# Patient Record
Sex: MALE | Race: WHITE | NOT HISPANIC OR LATINO | Employment: FULL TIME | URBAN - METROPOLITAN AREA
[De-identification: names, ages, dates, MRNs, and addresses within clinical notes are randomized per-mention and may not be internally consistent; named-entity substitution may affect disease eponyms.]

---

## 2020-03-10 ENCOUNTER — OFFICE VISIT (OUTPATIENT)
Dept: FAMILY MEDICINE | Facility: CLINIC | Age: 50
End: 2020-03-10
Payer: COMMERCIAL

## 2020-03-10 VITALS
DIASTOLIC BLOOD PRESSURE: 65 MMHG | BODY MASS INDEX: 27.26 KG/M2 | TEMPERATURE: 97.9 F | WEIGHT: 173.7 LBS | SYSTOLIC BLOOD PRESSURE: 114 MMHG | OXYGEN SATURATION: 97 % | HEIGHT: 67 IN | HEART RATE: 52 BPM

## 2020-03-10 DIAGNOSIS — M67.441 GANGLION CYST OF FLEXOR TENDON SHEATH OF FINGER OF RIGHT HAND: ICD-10-CM

## 2020-03-10 DIAGNOSIS — D22.9 NUMEROUS MOLES: ICD-10-CM

## 2020-03-10 DIAGNOSIS — Z13.220 LIPID SCREENING: ICD-10-CM

## 2020-03-10 DIAGNOSIS — Z12.5 SCREENING FOR PROSTATE CANCER: ICD-10-CM

## 2020-03-10 DIAGNOSIS — L30.9 ECZEMA, UNSPECIFIED TYPE: ICD-10-CM

## 2020-03-10 DIAGNOSIS — R68.82 DECREASED LIBIDO: ICD-10-CM

## 2020-03-10 DIAGNOSIS — Z00.00 ROUTINE HISTORY AND PHYSICAL EXAMINATION OF ADULT: Primary | ICD-10-CM

## 2020-03-10 DIAGNOSIS — J30.9 ALLERGIC RHINITIS, UNSPECIFIED SEASONALITY, UNSPECIFIED TRIGGER: ICD-10-CM

## 2020-03-10 DIAGNOSIS — Z12.11 COLON CANCER SCREENING: ICD-10-CM

## 2020-03-10 DIAGNOSIS — B35.3 TINEA PEDIS OF BOTH FEET: ICD-10-CM

## 2020-03-10 DIAGNOSIS — N43.40 SPERMATOCELE: ICD-10-CM

## 2020-03-10 DIAGNOSIS — J45.20 MILD INTERMITTENT ASTHMA WITHOUT COMPLICATION: ICD-10-CM

## 2020-03-10 LAB
BASOPHILS # BLD AUTO: 0 10E9/L (ref 0–0.2)
BASOPHILS NFR BLD AUTO: 0.7 %
DIFFERENTIAL METHOD BLD: NORMAL
EOSINOPHIL # BLD AUTO: 0.2 10E9/L (ref 0–0.7)
EOSINOPHIL NFR BLD AUTO: 3.7 %
ERYTHROCYTE [DISTWIDTH] IN BLOOD BY AUTOMATED COUNT: 13.1 % (ref 10–15)
HCT VFR BLD AUTO: 42.2 % (ref 40–53)
HGB BLD-MCNC: 14.3 G/DL (ref 13.3–17.7)
LYMPHOCYTES # BLD AUTO: 2 10E9/L (ref 0.8–5.3)
LYMPHOCYTES NFR BLD AUTO: 33.2 %
MCH RBC QN AUTO: 29.7 PG (ref 26.5–33)
MCHC RBC AUTO-ENTMCNC: 33.9 G/DL (ref 31.5–36.5)
MCV RBC AUTO: 88 FL (ref 78–100)
MONOCYTES # BLD AUTO: 0.6 10E9/L (ref 0–1.3)
MONOCYTES NFR BLD AUTO: 9.9 %
NEUTROPHILS # BLD AUTO: 3.1 10E9/L (ref 1.6–8.3)
NEUTROPHILS NFR BLD AUTO: 52.5 %
PLATELET # BLD AUTO: 226 10E9/L (ref 150–450)
RBC # BLD AUTO: 4.81 10E12/L (ref 4.4–5.9)
WBC # BLD AUTO: 6 10E9/L (ref 4–11)

## 2020-03-10 PROCEDURE — 85025 COMPLETE CBC W/AUTO DIFF WBC: CPT | Performed by: INTERNAL MEDICINE

## 2020-03-10 PROCEDURE — G0103 PSA SCREENING: HCPCS | Performed by: INTERNAL MEDICINE

## 2020-03-10 PROCEDURE — 36415 COLL VENOUS BLD VENIPUNCTURE: CPT | Performed by: INTERNAL MEDICINE

## 2020-03-10 PROCEDURE — 82274 ASSAY TEST FOR BLOOD FECAL: CPT | Performed by: INTERNAL MEDICINE

## 2020-03-10 PROCEDURE — 84270 ASSAY OF SEX HORMONE GLOBUL: CPT | Performed by: INTERNAL MEDICINE

## 2020-03-10 PROCEDURE — 99214 OFFICE O/P EST MOD 30 MIN: CPT | Mod: 25 | Performed by: INTERNAL MEDICINE

## 2020-03-10 PROCEDURE — 84403 ASSAY OF TOTAL TESTOSTERONE: CPT | Performed by: INTERNAL MEDICINE

## 2020-03-10 PROCEDURE — 99386 PREV VISIT NEW AGE 40-64: CPT | Performed by: INTERNAL MEDICINE

## 2020-03-10 PROCEDURE — 80053 COMPREHEN METABOLIC PANEL: CPT | Performed by: INTERNAL MEDICINE

## 2020-03-10 PROCEDURE — 80061 LIPID PANEL: CPT | Performed by: INTERNAL MEDICINE

## 2020-03-10 RX ORDER — CETIRIZINE HYDROCHLORIDE 10 MG/1
10 TABLET ORAL DAILY
COMMUNITY
End: 2020-11-09

## 2020-03-10 RX ORDER — KETOCONAZOLE 20 MG/G
CREAM TOPICAL 2 TIMES DAILY
Qty: 30 G | Refills: 1 | Status: SHIPPED | OUTPATIENT
Start: 2020-03-10

## 2020-03-10 RX ORDER — MONTELUKAST SODIUM 10 MG/1
10 TABLET ORAL AT BEDTIME
Qty: 30 TABLET | Refills: 3 | Status: SHIPPED | OUTPATIENT
Start: 2020-03-10 | End: 2020-07-29

## 2020-03-10 RX ORDER — TOLNAFTATE 1 G/100G
POWDER TOPICAL 2 TIMES DAILY
Qty: 108 G | Refills: 2 | Status: SHIPPED | OUTPATIENT
Start: 2020-03-10 | End: 2020-11-09

## 2020-03-10 RX ORDER — ALBUTEROL SULFATE 90 UG/1
2 AEROSOL, METERED RESPIRATORY (INHALATION) EVERY 6 HOURS
COMMUNITY

## 2020-03-10 RX ORDER — TRIAMCINOLONE ACETONIDE 1 MG/G
CREAM TOPICAL 2 TIMES DAILY
Qty: 30 G | Refills: 2 | Status: SHIPPED | OUTPATIENT
Start: 2020-03-10 | End: 2020-11-09

## 2020-03-10 ASSESSMENT — MIFFLIN-ST. JEOR: SCORE: 1606.53

## 2020-03-10 NOTE — PATIENT INSTRUCTIONS
"Proceed with your testicular ultrasound.  [phone]    Follow up with Urologist for your Spermatocele.    Follow up with Hand specialist for your right palm cyst.    Wash and dry the affected area and then apply the prescribed cream thinly followed by the prescribed powder.  Do this twice a day until the rash resolves and continue for another 2 weeks after.    Maintain low fat/calorie diet and regular exercise.    Consider getting the Flu vaccine.    Don't forget to return your stool sample.    Follow up yearly or as needed.    Patient Education     Ulnar Nerve Palsy  The ulnar nerve travels down the arm from the shoulder to the hand. It controls movement and feeling in the wrist and hand. Damage to this nerve can cause a condition called ulnar nerve palsy.  A common cause of ulnar nerve palsy is leaning on the elbow for long periods of time. Injury to the arm or elbow, such as a fracture, can also damage the ulnar nerve.  Symptoms of ulnar nerve palsy include:    Numbness, tingling, or burning (often described as \"pins and needles\")    Pain    Weakness or muscle shrinking in the hand and fingers  The treatment depends on the cause of the nerve damage. In some cases, the problem will go away on its own once pressure to the nerve is relieved. Certain tests may be done to help determine the injury and extent of damage. These include nerve conduction studies (NCS) and electromyography (EMG). Splinting the wrist to limit movement may help with healing. If the problem is due to trauma or injury, physical therapy may be prescribed. Corticosteroid injections into the area may reduce swelling and pressure on the nerve. Surgery to repair the nerve may be needed for chronic symptoms that don't respond to simpler treatments.  Home care    Rest the wrist and arm until normal feeling and strength return.    If you were given a splint or sling, wear it as directed.    Don't lean on your elbows.    If medicines were " prescribed for pain or nerve sensations, take them as directed.  Follow-up care  Follow up with your healthcare provider or as advised by our staff.  When to seek medical advice  Call your healthcare provider right away if you have any of the following:    Increasing arm swelling or pain    Numbness or weakness of the arm    Symptoms spread to other parts of the body    Slurred speech, confusion    Trouble speaking, walking, or seeing  Date Last Reviewed: 4/1/2018 2000-2019 [employer]. [address] [place] [address]. All rights reserved. This information is not intended as a substitute for professional medical care. Always follow your healthcare professional's instructions.           Patient Education     What is Cubital Tunnel Syndrome?  Cubital tunnel syndrome is a set of symptoms that may occur if the ulnar nerve in your elbow gets pinched. This may happen if you bend or lean on your elbows often.    Your cubital tunnel  The cubital tunnel is a groove in a bone near your elbow. This narrow groove provides a passage for the ulnar nerve, one of the main nerves in your arm. The ulnar nerve can cause  funny bone  pain if your elbow gets bumped. Your cubital tunnel helps protect this nerve as it passes through your elbow and down to your fingers.  Compressing the ulnar nerve  Bending your elbow compresses the ulnar nerve inside the cubital tunnel. The nerve can get inflamed (irritated) after constant bending and pinching or after getting hurt. Over time, this can lead to pain or numbness. The pain is often felt in your ring fingers and little fingers.     Bending your elbow as you hold a phone can cause problems over time.      What are its symptoms?    Numbness or tingling in the ring fingers and little fingers    Loss of finger or hand strength    Inability to straighten fingers    Sharp, sudden pain when elbow is touched    Shrinking of hand muscles  The road to healing  You can keep  cubital tunnel syndrome from flaring up. Keep your arm straight as much as you can, even while sleeping, to prevent pinching of the ulnar nerve. And use phone headsets and elbow pads. If you still have pain, tell your doctor.   Date Last Reviewed: 5/1/2018 2000-2019 [employer]. [address] [place] [address], [place] [address]. All rights reserved. This information is not intended as a substitute for professional medical care. Always follow your healthcare professional's instructions.

## 2020-03-10 NOTE — PROGRESS NOTES
SUBJECTIVE:   CC: Nick Merlos is an 50 year old male who presents for preventative health visit.         Healthy Habits:     Getting at least 3 servings of Calcium per day:  Yes (1-2 servings)    Bi-annual eye exam:  Yes    Dental care twice a year:  NO    Sleep apnea or symptoms of sleep apnea:  None    Diet:  Regular (no restrictions)    Frequency of exercise:  6-7 days/week    Duration of exercise:  30-45 minutes    Taking medications regularly:  Yes    Barriers to taking medications:  None    Medication side effects:  None    PHQ-2 Total Score: 0    Additional concerns today:  Yes (Labs, liver, kidney)      Today's PHQ-2 Score:   PHQ-2 ( 1999 Pfizer) 3/10/2020   Q1: Little interest or pleasure in doing things 0   Q2: Feeling down, depressed or hopeless 0   PHQ-2 Score 0       Abuse: Current or Past(Physical, Sexual or Emotional)- No  Do you feel safe in your environment? Yes      Social History     Tobacco Use     Smoking status: Former Smoker     Smokeless tobacco: Never Used   Substance Use Topics     Alcohol use: Yes     Comment: 3 drinks per week     If you drink alcohol do you typically have >3 drinks per day or >7 drinks per week? No    Alcohol Use 3/10/2020   Prescreen: >3 drinks/day or >7 drinks/week? No       Last PSA:   PSA   Date Value Ref Range Status   03/10/2020 1.01 0 - 4 ug/L Final     Comment:     Assay Method:  Chemiluminescence using Siemens Vista analyzer       Reviewed orders with patient. Reviewed health maintenance and updated orders accordingly - Yes      Reviewed and updated as needed this visit by clinical staff  Tobacco  Allergies  Meds  Problems  Med Hx  Surg Hx  Soc Hx        Reviewed and updated as needed this visit by Provider  Allergies  Meds  Problems  Med Hx  Surg Hx        History reviewed. No pertinent past medical history.    History reviewed. No pertinent surgical history.    No family history on file.    Social History     Tobacco Use     Smoking status:  "Former Smoker     Smokeless tobacco: Never Used   Substance Use Topics     Alcohol use: Yes     Comment: 3 drinks per week       Review of Systems   Constitutional: Negative for chills, fatigue and fever.   HENT: Negative for congestion, ear pain, hearing loss, sore throat and trouble swallowing.    Eyes: Negative for pain and visual disturbance.   Respiratory: Negative for cough and shortness of breath.    Cardiovascular: Negative for chest pain and palpitations.   Gastrointestinal: Negative for abdominal pain, blood in stool, constipation, diarrhea, nausea and vomiting.   Genitourinary: Negative for difficulty urinating and testicular pain.   Musculoskeletal: Negative for arthralgias, back pain, myalgias and neck pain.   Skin: Negative for rash.   Neurological: Negative for dizziness, light-headedness, numbness and headaches.   Psychiatric/Behavioral: Negative for sleep disturbance. The patient is not nervous/anxious.          OBJECTIVE:   /65 (BP Location: Left arm, Cuff Size: Adult Regular)   Pulse 52   Temp 97.9  F (36.6  C) (Oral)   Ht 1.702 m (5' 7\")   Wt 78.8 kg (173 lb 11.2 oz)   SpO2 97%   BMI 27.21 kg/m      Physical Exam  Vitals signs and nursing note reviewed.   Constitutional:       General: He is not in acute distress.  HENT:      Right Ear: External ear normal.      Left Ear: External ear normal.   Eyes:      Conjunctiva/sclera: Conjunctivae normal.      Pupils: Pupils are equal, round, and reactive to light.   Neck:      Thyroid: No thyromegaly.   Cardiovascular:      Rate and Rhythm: Normal rate and regular rhythm.      Heart sounds: Normal heart sounds.   Pulmonary:      Effort: Pulmonary effort is normal. No respiratory distress.      Breath sounds: Normal breath sounds.   Abdominal:      Palpations: Abdomen is soft.      Tenderness: There is no abdominal tenderness.   Genitourinary:     Penis: No discharge.    Musculoskeletal: Normal range of motion.         General: No tenderness. "   Lymphadenopathy:      Cervical: No cervical adenopathy.   Skin:     Findings: No rash.   Neurological:      Mental Status: He is alert and oriented to person, place, and time.      Coordination: Coordination normal.       ASSESSMENT/PLAN:       ICD-10-CM    1. Routine history and physical examination of adult  Z00.00 CBC with platelets differential     Comprehensive metabolic panel   2. Lipid screening  Z13.220 Lipid panel reflex to direct LDL Fasting   3. Colon cancer screening  Z12.11 Fecal colorectal cancer screen FIT   4. Screening for prostate cancer  Z12.5 Prostate spec antigen screen   5. Mild intermittent asthma without complication  J45.20 montelukast (SINGULAIR) 10 MG tablet   6. Allergic rhinitis, unspecified seasonality, unspecified trigger  J30.9 montelukast (SINGULAIR) 10 MG tablet   7. Ganglion cyst of flexor tendon sheath of finger of right hand  M67.441 ORTHOPEDICS ADULT REFERRAL   8. Tinea pedis of both feet  B35.3 ketoconazole (NIZORAL) 2 % external cream     tolnaftate (TINACTIN) 1 % external powder   9. Decreased libido  R68.82 Testosterone Free and Total   10. Spermatocele  N43.40 US Testicular and Scrotum     UROLOGY ADULT REFERRAL   11. Numerous moles  D22.9 DERMATOLOGY REFERRAL   12. Eczema, unspecified type  L30.9 triamcinolone (KENALOG) 0.1 % external cream         Patient Instructions     Proceed with your testicular ultrasound.  [phone]    Follow up with Urologist for your Spermatocele.    Follow up with Hand specialist for your right palm cyst.    Wash and dry the affected area and then apply the prescribed cream thinly followed by the prescribed powder.  Do this twice a day until the rash resolves and continue for another 2 weeks after.    Maintain low fat/calorie diet and regular exercise.    Consider getting the Flu vaccine.    Don't forget to return your stool sample.    Follow up yearly or as needed.    Patient Education     Ulnar Nerve Palsy  The ulnar nerve travels down  "the arm from the shoulder to the hand. It controls movement and feeling in the wrist and hand. Damage to this nerve can cause a condition called ulnar nerve palsy.  A common cause of ulnar nerve palsy is leaning on the elbow for long periods of time. Injury to the arm or elbow, such as a fracture, can also damage the ulnar nerve.  Symptoms of ulnar nerve palsy include:    Numbness, tingling, or burning (often described as \"pins and needles\")    Pain    Weakness or muscle shrinking in the hand and fingers  The treatment depends on the cause of the nerve damage. In some cases, the problem will go away on its own once pressure to the nerve is relieved. Certain tests may be done to help determine the injury and extent of damage. These include nerve conduction studies (NCS) and electromyography (EMG). Splinting the wrist to limit movement may help with healing. If the problem is due to trauma or injury, physical therapy may be prescribed. Corticosteroid injections into the area may reduce swelling and pressure on the nerve. Surgery to repair the nerve may be needed for chronic symptoms that don't respond to simpler treatments.  Home care    Rest the wrist and arm until normal feeling and strength return.    If you were given a splint or sling, wear it as directed.    Don't lean on your elbows.    If medicines were prescribed for pain or nerve sensations, take them as directed.  Follow-up care  Follow up with your healthcare provider or as advised by our staff.  When to seek medical advice  Call your healthcare provider right away if you have any of the following:    Increasing arm swelling or pain    Numbness or weakness of the arm    Symptoms spread to other parts of the body    Slurred speech, confusion    Trouble speaking, walking, or seeing  Date Last Reviewed: 4/1/2018 2000-2019 [employer]. [address] [place] [id number]. All rights reserved. This information is not intended as a " substitute for professional medical care. Always follow your healthcare professional's instructions.           Patient Education     What is Cubital Tunnel Syndrome?  Cubital tunnel syndrome is a set of symptoms that may occur if the ulnar nerve in your elbow gets pinched. This may happen if you bend or lean on your elbows often.    Your cubital tunnel  The cubital tunnel is a groove in a bone near your elbow. This narrow groove provides a passage for the ulnar nerve, one of the main nerves in your arm. The ulnar nerve can cause  funny bone  pain if your elbow gets bumped. Your cubital tunnel helps protect this nerve as it passes through your elbow and down to your fingers.  Compressing the ulnar nerve  Bending your elbow compresses the ulnar nerve inside the cubital tunnel. The nerve can get inflamed (irritated) after constant bending and pinching or after getting hurt. Over time, this can lead to pain or numbness. The pain is often felt in your ring fingers and little fingers.     Bending your elbow as you hold a phone can cause problems over time.      What are its symptoms?    Numbness or tingling in the ring fingers and little fingers    Loss of finger or hand strength    Inability to straighten fingers    Sharp, sudden pain when elbow is touched    Shrinking of hand muscles  The road to healing  You can keep cubital tunnel syndrome from flaring up. Keep your arm straight as much as you can, even while sleeping, to prevent pinching of the ulnar nerve. And use phone headsets and elbow pads. If you still have pain, tell your doctor.   Date Last Reviewed: 5/1/2018 2000-2019 [employer]. [address] [place] [address], [place] [id number]. All rights reserved. This information is not intended as a substitute for professional medical care. Always follow your healthcare professional's instructions.             COUNSELING:   Reviewed preventive health counseling, as reflected in patient  "instructions    Estimated body mass index is 27.21 kg/m  as calculated from the following:    Height as of this encounter: 1.702 m (5' 7\").    Weight as of this encounter: 78.8 kg (173 lb 11.2 oz).     Weight management plan: Discussed healthy diet and exercise guidelines     reports that he has quit smoking. He has never used smokeless tobacco.      Counseling Resources:  ATP IV Guidelines  Pooled Cohorts Equation Calculator  FRAX Risk Assessment  ICSI Preventive Guidelines  Dietary Guidelines for Americans, 2010  USDA's MyPlate  ASA Prophylaxis  Lung CA Screening    Milad Roberson MD  [facility]  "

## 2020-03-10 NOTE — LETTER
March 16, 2020      Nick Montiel  [address]   [facility] [address]        Dear Mr.Hodge Montiel,    Good day to you Mr. Tacho Montiel.     Your testosterone levels are normal.        If you have any questions or concerns, please call the clinic at the number listed above.       Sincerely,        Milad Roberson MD/ Marialuisa Berkowitz CMA

## 2020-03-10 NOTE — LETTER
March 18, 2020      iNck Montiel  [address]   [facility] [address]      Good day to you Mr. Tacho Montiel.     Your stool test for colon cancer screening is normal.  Although this method is an accepted standard for colon cancer screening, it is not as sensitive as a colonoscopy and still has a margin for error.  This test will need to be performed once a year.  The [place] Preventive Services Task Force (USPSTF) does not give preference for any one screening test over another.     Please seek medical attention if you develop unexplained weight loss, persistent abdominal pain/discomfort, nausea/vomiting, changes in bowel movements, blood in your stools, or any other unusual intestinal symptoms.     Jose,     Dr. Roberson / magui    Resulted Orders   Fecal colorectal cancer screen FIT   Result Value Ref Range    Occult Blood Scn FIT Negative NEG^Negative

## 2020-03-10 NOTE — LETTER
March 12, 2020      Nick Montiel  [address]   [facility] [address]        Dear Mr.Hodge Montiel,    Good day to you Mr. Tacho Montiel.     Your prostate cancer screening test (PSA) is normal.     Your blood counts, electrolytes (sodium, potassium), kidney (creatinine, GFR) and liver function (alk phos, AST, ALT) are within normal limits.     Your fasting blood sugar (glucose) is mildly elevated.  Although this is not at the level of diabetes yet, your chances for developing diabetes later on is greater compared to having a blood sugar less than 100.  I would advise that you maintain a low calorie/carbohydrate diet and regular exercise.     Your cholesterol levels, especially your LDL level, are significantly high and puts you at great risk for developing heart disease and stroke.  Please call me at the clinic to discuss further management.     Resulted Orders   CBC with platelets differential   Result Value Ref Range    WBC 6.0 4.0 - 11.0 10e9/L    RBC Count 4.81 4.4 - 5.9 10e12/L    Hemoglobin 14.3 13.3 - 17.7 g/dL    Hematocrit 42.2 40.0 - 53.0 %    MCV 88 78 - 100 fl    MCH 29.7 26.5 - 33.0 pg    MCHC 33.9 31.5 - 36.5 g/dL    RDW 13.1 10.0 - 15.0 %    Platelet Count 226 150 - 450 10e9/L    % Neutrophils 52.5 %    % Lymphocytes 33.2 %    % Monocytes 9.9 %    % Eosinophils 3.7 %    % Basophils 0.7 %    Absolute Neutrophil 3.1 1.6 - 8.3 10e9/L    Absolute Lymphocytes 2.0 0.8 - 5.3 10e9/L    Absolute Monocytes 0.6 0.0 - 1.3 10e9/L    Absolute Eosinophils 0.2 0.0 - 0.7 10e9/L    Absolute Basophils 0.0 0.0 - 0.2 10e9/L    Diff Method Automated Method    Comprehensive metabolic panel   Result Value Ref Range    Sodium 137 133 - 144 mmol/L    Potassium 4.2 3.4 - 5.3 mmol/L    Chloride 107 94 - 109 mmol/L    Carbon Dioxide 26 20 - 32 mmol/L    Anion Gap 4 3 - 14 mmol/L    Glucose 103 (H) 70 - 99 mg/dL    Urea Nitrogen 15 7 - 30 mg/dL    Creatinine 0.86 0.66 - 1.25 mg/dL    GFR Estimate >90 >60  mL/min/[1.73_m2]      Comment:      Non  GFR Calc  Starting 12/18/2018, serum creatinine based estimated GFR (eGFR) will be   calculated using the Chronic Kidney Disease Epidemiology Collaboration   (CKD-EPI) equation.      GFR Estimate If Black >90 >60 mL/min/[1.73_m2]      Comment:       GFR Calc  Starting 12/18/2018, serum creatinine based estimated GFR (eGFR) will be   calculated using the Chronic Kidney Disease Epidemiology Collaboration   (CKD-EPI) equation.      Calcium 9.0 8.5 - 10.1 mg/dL    Bilirubin Total 0.4 0.2 - 1.3 mg/dL    Albumin 3.8 3.4 - 5.0 g/dL    Protein Total 7.5 6.8 - 8.8 g/dL    Alkaline Phosphatase 84 40 - 150 U/L    ALT 35 0 - 70 U/L    AST 16 0 - 45 U/L   Lipid panel reflex to direct LDL Fasting   Result Value Ref Range    Cholesterol 245 (H) <200 mg/dL      Comment:      Desirable:       <200 mg/dl    Triglycerides 119 <150 mg/dL    HDL Cholesterol 34 (L) >39 mg/dL    LDL Cholesterol Calculated 187 (H) <100 mg/dL      Comment:      Above desirable:  100-129 mg/dl  Borderline High:  130-159 mg/dL  High:             160-189 mg/dL  Very high:       >189 mg/dl      Non HDL Cholesterol 211 (H) <130 mg/dL      Comment:      Above Desirable:  130-159 mg/dl  Borderline high:  160-189 mg/dl  High:             190-219 mg/dl  Very high:       >219 mg/dl     Prostate spec antigen screen   Result Value Ref Range    PSA 1.01 0 - 4 ug/L      Comment:      Assay Method:  Chemiluminescence using Siemens Vista analyzer       If you have any questions or concerns, please call the clinic at the number listed above.       Sincerely,        Milad Roberson MD/ Marialuisa Berkowitz CMA

## 2020-03-11 LAB
ALBUMIN SERPL-MCNC: 3.8 G/DL (ref 3.4–5)
ALP SERPL-CCNC: 84 U/L (ref 40–150)
ALT SERPL W P-5'-P-CCNC: 35 U/L (ref 0–70)
ANION GAP SERPL CALCULATED.3IONS-SCNC: 4 MMOL/L (ref 3–14)
AST SERPL W P-5'-P-CCNC: 16 U/L (ref 0–45)
BILIRUB SERPL-MCNC: 0.4 MG/DL (ref 0.2–1.3)
BUN SERPL-MCNC: 15 MG/DL (ref 7–30)
CALCIUM SERPL-MCNC: 9 MG/DL (ref 8.5–10.1)
CHLORIDE SERPL-SCNC: 107 MMOL/L (ref 94–109)
CHOLEST SERPL-MCNC: 245 MG/DL
CO2 SERPL-SCNC: 26 MMOL/L (ref 20–32)
CREAT SERPL-MCNC: 0.86 MG/DL (ref 0.66–1.25)
GFR SERPL CREATININE-BSD FRML MDRD: >90 ML/MIN/{1.73_M2}
GLUCOSE SERPL-MCNC: 103 MG/DL (ref 70–99)
HDLC SERPL-MCNC: 34 MG/DL
LDLC SERPL CALC-MCNC: 187 MG/DL
NONHDLC SERPL-MCNC: 211 MG/DL
POTASSIUM SERPL-SCNC: 4.2 MMOL/L (ref 3.4–5.3)
PROT SERPL-MCNC: 7.5 G/DL (ref 6.8–8.8)
PSA SERPL-ACNC: 1.01 UG/L (ref 0–4)
SODIUM SERPL-SCNC: 137 MMOL/L (ref 133–144)
TRIGL SERPL-MCNC: 119 MG/DL

## 2020-03-12 LAB
SHBG SERPL-SCNC: 24 NMOL/L (ref 11–80)
TESTOST FREE SERPL-MCNC: 8.66 NG/DL (ref 4.7–24.4)
TESTOST SERPL-MCNC: 363 NG/DL (ref 240–950)

## 2020-03-16 ASSESSMENT — ENCOUNTER SYMPTOMS
SLEEP DISTURBANCE: 0
NERVOUS/ANXIOUS: 0
MYALGIAS: 0
FEVER: 0
BLOOD IN STOOL: 0
VOMITING: 0
CHILLS: 0
HEADACHES: 0
NECK PAIN: 0
LIGHT-HEADEDNESS: 0
PALPITATIONS: 0
DIARRHEA: 0
CONSTIPATION: 0
DIZZINESS: 0
FATIGUE: 0
COUGH: 0
ARTHRALGIAS: 0
TROUBLE SWALLOWING: 0
DIFFICULTY URINATING: 0
EYE PAIN: 0
SHORTNESS OF BREATH: 0
ABDOMINAL PAIN: 0
SORE THROAT: 0
BACK PAIN: 0
NAUSEA: 0
NUMBNESS: 0

## 2020-03-17 ENCOUNTER — PRE VISIT (OUTPATIENT)
Dept: UROLOGY | Facility: CLINIC | Age: 50
End: 2020-03-17

## 2020-03-17 NOTE — TELEPHONE ENCOUNTER
Reason for Visit: Consult    Diagnosis: Spermatocele    Orders/Procedures/Records: Records available    Contact Patient: N/A    Rooming Requirements: Normal      Hannah Kelley  03/17/20  1:38 PM

## 2020-03-17 NOTE — TELEPHONE ENCOUNTER
MEDICAL RECORDS REQUEST   [facility]  Urology Clinic  [address] [place] [address]  PHONE: [phone]  Fax: [phone]        FUTURE VISIT INFORMATION                                                   Nick Lenz Titusomari Montiel, : 1970 scheduled for future visit at [facility]    APPOINTMENT INFORMATION:    Date: 20 5PM    Provider:  Cherie Bartlett RN    Reason for Visit/Diagnosis:  Spermatocele    REFERRAL INFORMATION:    Referring provider:  ROSS CONNORS    Specialty: MD    Referring providers clinic:  [facility]    Clinic contact number:  [phone]    RECORDS REQUESTED FOR VISIT                                                     NOTES  STATUS/DETAILS   OFFICE NOTE from referring provider  yes   OFFICE NOTE from other specialist  no   DISCHARGE SUMMARY from hospital  no   DISCHARGE REPORT from the ER  no   OPERATIVE REPORT  no   MEDICATION LIST  no     PRE-VISIT CHECKLIST      Record collection complete Yes- Internal recs in epic   Appointment appropriately scheduled           (right time/right provider) Yes   MyChart activation If no, please explain: In process    Questionnaire complete If no, please explain: In process      Completed by: Katelyn Montiel

## 2020-03-18 LAB — HEMOCCULT STL QL IA: NEGATIVE

## 2020-03-25 ENCOUNTER — HOSPITAL ENCOUNTER (OUTPATIENT)
Dept: ULTRASOUND IMAGING | Facility: CLINIC | Age: 50
Discharge: HOME | End: 2020-03-25
Attending: INTERNAL MEDICINE | Admitting: INTERNAL MEDICINE
Payer: COMMERCIAL

## 2020-03-25 DIAGNOSIS — N43.40 SPERMATOCELE: ICD-10-CM

## 2020-03-25 PROCEDURE — 76870 US EXAM SCROTUM: CPT

## 2020-04-01 ENCOUNTER — PRE VISIT (OUTPATIENT)
Dept: UROLOGY | Facility: CLINIC | Age: 50
End: 2020-04-01

## 2020-04-07 ENCOUNTER — TELEPHONE (OUTPATIENT)
Dept: UROLOGY | Facility: CLINIC | Age: 50
End: 2020-04-07

## 2020-04-07 NOTE — TELEPHONE ENCOUNTER
Spoke with patient to convert their upcoming appointment with Cherie Bartlett CNP to a Virtual Visit. Patient downloaded the application [employer].      Hannah Kelley EMT

## 2020-04-14 ENCOUNTER — TRANSFERRED RECORDS (OUTPATIENT)
Dept: HEALTH INFORMATION MANAGEMENT | Facility: CLINIC | Age: 50
End: 2020-04-14

## 2020-04-17 ENCOUNTER — VIRTUAL VISIT (OUTPATIENT)
Dept: UROLOGY | Facility: CLINIC | Age: 50
End: 2020-04-17
Payer: COMMERCIAL

## 2020-04-17 DIAGNOSIS — N50.3 EPIDIDYMAL CYST: Primary | ICD-10-CM

## 2020-04-17 ASSESSMENT — PAIN SCALES - GENERAL: PAINLEVEL: NO PAIN (0)

## 2020-04-17 NOTE — PROGRESS NOTES
"Nick Montiel is a 50 year old male who is being evaluated via a billable video visit.      The patient has been notified of following:     \"This video visit will be conducted via a call between you and your physician/provider. We have found that certain health care needs can be provided without the need for an in-person physical exam.  This service lets us provide the care you need with a video conversation.  If a prescription is necessary we can send it directly to your pharmacy.  If lab work is needed we can place an order for that and you can then stop by our lab to have the test done at a later time.    Video visits are billed at different rates depending on your insurance coverage.  Please reach out to your insurance provider with any questions.    If during the course of the call the physician/provider feels a video visit is not appropriate, you will not be charged for this service.\"    Patient has given verbal consent for Video visit? Yes    How would you like to obtain your AVS? Suzy    Patient would like the video invitation sent by: Text to cell phone: [phone]      Video Start Time: 1612            Chief Complaint:   Spermatocele         History of Present Illness:    Nick Montiel is a very pleasant 50 year old male who presents today via video visit for evaluation of the above. A few months ago, he noticed nodularity within his right scrotum. A scrotal ultrasound was obtained on 3/25/20, which demonstrated two slightly complex right epididymal head cysts, the largest measuring 3.8 cm. Today, he states that these are somewhat bothersome due to their size, and he worries that they will continue to get larger. On occasion, he has discomfort in this area with ejaculation. He also notes that he has experienced a dry ejaculation a few times prior to noticing these cysts. He is interested in pursuing surgical intervention.          Past Medical History:   No past medical history on " file.         Past Surgical History:   No past surgical history on file.         Medications     Current Outpatient Medications   Medication     albuterol (PROAIR HFA/PROVENTIL HFA/VENTOLIN HFA) 108 (90 Base) MCG/ACT inhaler     cetirizine (ZYRTEC) 10 MG tablet     ketoconazole (NIZORAL) 2 % external cream     montelukast (SINGULAIR) 10 MG tablet     tolnaftate (TINACTIN) 1 % external powder     triamcinolone (KENALOG) 0.1 % external cream     No current facility-administered medications for this visit.             Family History:   No family history on file.         Social History:     Social History     Socioeconomic History     Marital status:      Spouse name: Not on file     Number of children: Not on file     Years of education: Not on file     Highest education level: Not on file   Occupational History     Not on file   Social Needs     Financial resource strain: Not on file     Food insecurity     Worry: Not on file     Inability: Not on file     Transportation needs     Medical: Not on file     Non-medical: Not on file   Tobacco Use     Smoking status: Former Smoker     Smokeless tobacco: Never Used   Substance and Sexual Activity     Alcohol use: Yes     Comment: 3 drinks per week     Drug use: Never     Sexual activity: Yes     Partners: Male   Lifestyle     Physical activity     Days per week: Not on file     Minutes per session: Not on file     Stress: Not on file   Relationships     Social connections     Talks on phone: Not on file     Gets together: Not on file     Attends Christianity service: Not on file     Active member of club or organization: Not on file     Attends meetings of clubs or organizations: Not on file     Relationship status: Not on file     Intimate partner violence     Fear of current or ex partner: Not on file     Emotionally abused: Not on file     Physically abused: Not on file     Forced sexual activity: Not on file   Other Topics Concern     Not on file   Social History  Narrative     Not on file            Allergies:   Penicillins         Review of Systems:  From intake questionnaire   Negative 14 system review except as noted on HPI, nurse's note.         Physical Exam:   Patient is a 50 year old male   General Appearance Adult: Alert, no acute distress, oriented  Lungs: no respiratory distress, or pursed lip breathing  Neuro: Alert, oriented, speech and mentation normal      Labs and Pathology:    I personally reviewed all applicable laboratory data and went over findings with patient  Significant for:    CBC RESULTS:  Recent Labs   Lab Test 03/10/20  1207   WBC 6.0   HGB 14.3           BMP RESULTS:  Recent Labs   Lab Test 03/10/20  1207      POTASSIUM 4.2   CHLORIDE 107   CO2 26   ANIONGAP 4   *   BUN 15   CR 0.86   GFRESTIMATED >90   GFRESTBLACK >90   ANNIA 9.0       PSA RESULTS  PSA   Date Value Ref Range Status   03/10/2020 1.01 0 - 4 ug/L Final     Comment:     Assay Method:  Chemiluminescence using Siemens Vista analyzer         Imaging:    I personally reviewed all applicable imaging and went over findings with patient.  Significant for:    Results for orders placed or performed during the hospital encounter of 03/25/20   US Testicular and Scrotum    Narrative    ULTRASOUND TESTICULAR AND SCROTUM March 25, 2020 1:04 PM     HISTORY: Spermatocele.    COMPARISON: None.    FINDINGS:   Right: Normal homogeneous testicular echotexture, without focal mass.  Right testicle measures 4.5 x 3 x 2.5 cm. Doppler imaging with color  waveform analysis demonstrates normal arterial waveforms within the  testicle. No evidence for testicular torsion. Two slightly complex  right epididymal head cysts containing low-level echoes, with the  largest measuring 3.8 cm. No hydroceles or varicoceles are evident.     Left: Normal homogeneous testicular echotexture, without focal mass.  Left testicle measures 4.8 x 2.8 x 2.4 cm. Doppler imaging with color  waveform analysis  demonstrates normal arterial waveforms within the  testicle. No evidence for testicular torsion. Epididymis is normal. No  hydroceles or varicoceles are evident.        Impression    IMPRESSION:   1. There are two slightly complex right epididymal head cystic lesions  containing low-level echoes, with the largest measuring 3.8 cm.  2. Otherwise unremarkable testicular ultrasound. No evidence for  testicular torsion or intratesticular mass.     ROBERT ESPINOSA MD            Assessment and Plan:     Assessment: 50 year old male who presents via video visit for evaluation of large right-sided epididymal cysts, identified on ultrasound. These cysts have become bothersome, and he is concerned that they will continue to grow. He would like to pursue surgical intervention.     Plan:   -Follow up with Dr. Clarke to further discuss cyst treatment.       Video-Visit Details    Type of service:  Video Visit    Video End Time (time video stopped): 1621    Originating Location (pt. Location): Home    Distant Location (provider location):  [facility] AND [facility] [[email or web address]] FOR PROSTATE AND UROLOGIC CANCERS     Mode of Communication:  Video Conference via [facility]      Cherie Bartlett CNP  Department of Urology

## 2020-06-02 ENCOUNTER — MYC MEDICAL ADVICE (OUTPATIENT)
Dept: FAMILY MEDICINE | Facility: CLINIC | Age: 50
End: 2020-06-02

## 2020-06-02 DIAGNOSIS — F41.8 DEPRESSION WITH ANXIETY: Primary | ICD-10-CM

## 2020-06-02 NOTE — TELEPHONE ENCOUNTER
TO PCP:     See below, pt asking if you have recommendations/referral for a mental health professional who specializes with GLBT patients     Please advise     Shiloh JADE RN

## 2020-06-08 NOTE — TELEPHONE ENCOUNTER
Order for Mental Health with request for therapist knowledgeable of GLBT dynamics specified in the comments.

## 2020-06-10 ENCOUNTER — VIRTUAL VISIT (OUTPATIENT)
Dept: BEHAVIORAL HEALTH | Facility: CLINIC | Age: 50
End: 2020-06-10
Attending: INTERNAL MEDICINE
Payer: COMMERCIAL

## 2020-06-10 DIAGNOSIS — F10.10 ALCOHOL USE DISORDER, MILD, ABUSE: Primary | ICD-10-CM

## 2020-06-10 PROCEDURE — 99207 ZZC NO BILLABLE SERVICE THIS VISIT: CPT | Mod: GT | Performed by: SOCIAL WORKER

## 2020-06-10 ASSESSMENT — COLUMBIA-SUICIDE SEVERITY RATING SCALE - C-SSRS
1. IN THE PAST MONTH, HAVE YOU WISHED YOU WERE DEAD OR WISHED YOU COULD GO TO SLEEP AND NOT WAKE UP?: NO
1. IN THE PAST MONTH, HAVE YOU WISHED YOU WERE DEAD OR WISHED YOU COULD GO TO SLEEP AND NOT WAKE UP?: NO
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
## 2020-06-16 ENCOUNTER — MYC MEDICAL ADVICE (OUTPATIENT)
Dept: FAMILY MEDICINE | Facility: CLINIC | Age: 50
End: 2020-06-16

## 2020-06-16 NOTE — TELEPHONE ENCOUNTER
"TO PCP:     1) pt reports muscle aches from Atorvastatin 20mg and has not been taking per advice of pharmacist - would you recommend stopping/alternative med?     2) pt asking for a \"diet plan\" - unable to find this in notes/AVS    Please advise     Shiloh JADE RN    "

## 2020-06-18 ENCOUNTER — MYC MEDICAL ADVICE (OUTPATIENT)
Dept: FAMILY MEDICINE | Facility: CLINIC | Age: 50
End: 2020-06-18

## 2020-06-18 NOTE — TELEPHONE ENCOUNTER
Dr Roberson - see below message (and prior Duplicate message)     Pt awaiting a response regarding this    Please advise     Thank you  Shiloh JADE RN

## 2020-06-24 ENCOUNTER — VIRTUAL VISIT (OUTPATIENT)
Dept: BEHAVIORAL HEALTH | Facility: CLINIC | Age: 50
End: 2020-06-24
Payer: COMMERCIAL

## 2020-06-24 DIAGNOSIS — F10.20 ALCOHOL USE DISORDER, MODERATE, DEPENDENCE (H): Primary | ICD-10-CM

## 2020-06-24 DIAGNOSIS — F31.81 HYPOMANIC BIPOLAR II DISORDER (H): ICD-10-CM

## 2020-06-24 PROCEDURE — 90791 PSYCH DIAGNOSTIC EVALUATION: CPT | Mod: GT | Performed by: SOCIAL WORKER

## 2020-06-24 ASSESSMENT — ANXIETY QUESTIONNAIRES
1. FEELING NERVOUS, ANXIOUS, OR ON EDGE: NOT AT ALL
GAD7 TOTAL SCORE: 4
2. NOT BEING ABLE TO STOP OR CONTROL WORRYING: NOT AT ALL
4. TROUBLE RELAXING: SEVERAL DAYS
5. BEING SO RESTLESS THAT IT IS HARD TO SIT STILL: NOT AT ALL
7. FEELING AFRAID AS IF SOMETHING AWFUL MIGHT HAPPEN: NOT AT ALL
6. BECOMING EASILY ANNOYED OR IRRITABLE: NEARLY EVERY DAY
3. WORRYING TOO MUCH ABOUT DIFFERENT THINGS: NOT AT ALL

## 2020-06-24 ASSESSMENT — PATIENT HEALTH QUESTIONNAIRE - PHQ9: SUM OF ALL RESPONSES TO PHQ QUESTIONS 1-9: 4
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
## 2020-06-24 NOTE — Clinical Note
Milad King,    Your patient presented to [facility] for a diagnostic evaluation today.  They will be beginning individual therapy with me.  And will be encouraged to find support groups for alcohol use.    Please do not hesitate to contact me if you have any questions in regards to Nick Montiel's care.        Rossy Barnes, LICSW  June 24, 2020  [facility]
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
## 2020-06-25 ENCOUNTER — MYC MEDICAL ADVICE (OUTPATIENT)
Dept: FAMILY MEDICINE | Facility: CLINIC | Age: 50
End: 2020-06-25

## 2020-06-25 ASSESSMENT — ANXIETY QUESTIONNAIRES: GAD7 TOTAL SCORE: 4

## 2020-06-25 NOTE — TELEPHONE ENCOUNTER
PCP,    Please see MyQuoteApphart message below and advise.    Thank you,  Miguel Angel HENDERSON RN

## 2020-06-26 ENCOUNTER — TRANSFERRED RECORDS (OUTPATIENT)
Dept: HEALTH INFORMATION MANAGEMENT | Facility: CLINIC | Age: 50
End: 2020-06-26

## 2020-07-08 ENCOUNTER — VIRTUAL VISIT (OUTPATIENT)
Dept: BEHAVIORAL HEALTH | Facility: CLINIC | Age: 50
End: 2020-07-08
Payer: COMMERCIAL

## 2020-07-08 DIAGNOSIS — F10.20 ALCOHOL USE DISORDER, MODERATE, DEPENDENCE (H): ICD-10-CM

## 2020-07-08 PROCEDURE — 90834 PSYTX W PT 45 MINUTES: CPT | Mod: GT | Performed by: SOCIAL WORKER
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
## 2020-07-15 ENCOUNTER — MYC MEDICAL ADVICE (OUTPATIENT)
Dept: FAMILY MEDICINE | Facility: CLINIC | Age: 50
End: 2020-07-15

## 2020-07-15 ENCOUNTER — VIRTUAL VISIT (OUTPATIENT)
Dept: BEHAVIORAL HEALTH | Facility: CLINIC | Age: 50
End: 2020-07-15
Payer: COMMERCIAL

## 2020-07-15 DIAGNOSIS — F10.20 ALCOHOL USE DISORDER, MODERATE, DEPENDENCE (H): Primary | ICD-10-CM

## 2020-07-15 PROCEDURE — 90834 PSYTX W PT 45 MINUTES: CPT | Mod: GT | Performed by: SOCIAL WORKER

## 2020-07-15 NOTE — TELEPHONE ENCOUNTER
Please see My Chart message below and advise as appropriate.  Lab is pended  JANESSA DominguezN, RN  [employer] [facility]

## 2020-07-15 NOTE — PROGRESS NOTES
Progress Note    Patient Name: Nick Montiel  Date: 7/8/2020         Service Type: Individual      Session Start Time: 10:39  Session End Time: 11:30     Session Length: 51    Session #: 3    Attendees: Client attended alone    Service Modality:  Video Visit:    Telemedicine Visit: The patient's condition can be safely assessed and treated via synchronous audio and visual telemedicine encounter.      Reason for Telemedicine Visit: Services only offered telehealth    Originating Site (Patient Location): Patient's home    Distant Site (Provider Location): Provider Remote Setting    Consent:  The patient/guardian has verbally consented to: the potential risks and benefits of telemedicine (video visit) versus in person care; bill my insurance or make self-payment for services provided; and responsibility for payment of non-covered services.     Patient would like the video invitation sent by: Send to e-mail at: [email or web address]     Mode of Communication:  Video Conference via [employer]    As the provider I attest to compliance with applicable laws and regulations related to telemedicine.     Treatment Plan Last Reviewed: 7/8/2020  PHQ-9 / DAPHNE-7 : 4/4 - 6/24/2020    DATA  Interactive Complexity: No  Crisis: No       Progress Since Last Session (Related to Symptoms / Goals / Homework):   Symptoms: No change patient reports being busy with work and looking into schooling options    Homework: Partially completed  Completed in session      Episode of Care Goals: Achieved / completed to satisfaction - PREPARATION (Decided to change - considering how); Intervened by negotiating a change plan and determining options / strategies for behavior change, identifying triggers, exploring social supports, and working towards setting a date to begin behavior change     Current / Ongoing Stressors and Concerns:   Patient reports that  has concerns about drinking, that he is  critical of others and patient is concerned with this last part as well.  He is also concerned with his lack of follow through and that he gives up on things easily and would like to be more engaged in projects versus coming home and drinking at night.     Treatment Objective(s) Addressed in This Session:   Discuss negative thoughts and beliefs about self and others       Intervention:   CBT: discussed cognitive distortions        ASSESSMENT: Current Emotional / Mental Status (status of significant symptoms):   Risk status (Self / Other harm or suicidal ideation)   Patient denies current fears or concerns for personal safety.   Patient denies current or recent suicidal ideation or behaviors.   Patient denies current or recent homicidal ideation or behaviors.   Patient denies current or recent self injurious behavior or ideation.   Patient denies other safety concerns.   Patient reports there has been no change in risk factors since their last session.     Patient reports there has been no change in protective factors since their last session.     Recommended that patient call 911 or go to the local ED should there be a change in any of these risk factors.     Appearance:   Appropriate    Eye Contact:   Good    Psychomotor Behavior: Normal    Attitude:   Cooperative  Interested Playful   Orientation:   All   Speech    Rate / Production: Talkative Normal     Volume:  Normal    Mood:    Irritable    Affect:    Appropriate    Thought Content:  Clear    Thought Form:  Coherent  Logical  Tangential    Insight:    Fair      Medication Review:   No current psychiatric medications prescribed     Medication Compliance:   NA     Changes in Health Issues:   Yes: Cardiac issues, No Psychological Distress     Chemical Use Review:   Substance Use: Problem use continues with no change since last session, Stage of Change: Preparatory        Tobacco Use: No current tobacco use.      Diagnosis:  1. Alcohol use disorder, moderate,  dependence (H)        Collateral Reports Completed:   Not Applicable    PLAN: (Patient Tasks / Therapist Tasks / Other)  Patient will pay attention to patterns of thinking        LETICIA Naqvi                                                         ______________________________________________________________________    Treatment Plan    Patient's Name: Nick Montiel  YOB: 1970    Date: 7/8/2020    DSM5 Diagnoses: 296.89 Bipolar II Disorder Hypomanic or Substance-Related & Addictive Disorders Alcohol Use Disorder   303.90 (F10.20) Moderate continued use  Psychosocial / Contextual Factors:  Individual Factors has had problems with alcohol use his adult lifetime, has stolen items and engaged in high risk sexual behaivors more historically then recently, Family Factors Reports a negative relationship with mom and does not speak with dad.  His current partner is concerend about his alcohol use and his mood swings that cause anger, Community Factors he has experienced homelessness and has been engaged in the [employer] community and has performed drag and Economic Factors currently works as a  and is economically comfortable and has a history of difficulty maintaining employement.  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      Objective #A (Patient Action)    Patient will Identify negative self-talk and behaviors: challenge core beliefs, myths, and actions.  Status: New - Date: 7/8/2020     Intervention(s)  Therapist will assign homework to engage in  cognitive restructuring  provide support to critically look at how negative beliefs keep you from moving forward .    Objective #B  Patient will identify core beliefs and the times in your life that originally created beliefs.  Status: New - Date: 7/8/2020     Intervention(s)  Therapist will assign homework to challenge core beliefs  provide educational materials on core beliefs and lifetime patterns  teach how to challenge core beliefs.    Objective #C  Patient will will be working towards his goals .  Status: New - Date: 7/8/2020     Intervention(s)  Therapist will assign homework to continue to use skills from therapy  provide support to negotiate what works and what patient continues to struggle with.      Goal 2: Patient will not be so negative     I will know I've met my goal when My  would tell me I am nicer, I would be able to define what it means to be a more positive person.      Objective #A (Patient Action)    Status: New - Date: 7/8/2020     Patient will identify feelings and emotions that occur prior to more negative behaviors.    Intervention(s)  Therapist will assign homework to track patient thoughts and feelings  provide educational materials on cognitive distortions  teach cognitive restructuring.    Objective #B  Patient will identify atleast 3 other responses that I can have other than negative.    Status: New - Date: 7/8/2020     Intervention(s)  Therapist will assign homework to practice other types of responses  provide educational materials on effective ways to communicate with others  role-play more effective ways to communicate with others.    Objective #C  Patient will learn and demonstrate 3 assertiveness skill(s).  Status: New - Date: 7/8/2020     Intervention(s)  Therapist will assign homework to use assertive communication  role-play assertiveness skills.      Goal 3: Patient will find a hobby or interest    I will know I've met my goal when I go back to school and feel like I  have things that are meaningful.      Objective #A (Patient Action)    Status: New - Date: 7/8/2020     Patient will Identify what he would like to do and steps that would get him there.    Intervention(s)  Therapist will assign homework to decide on steps to get towards goals  teach the client how to complete a 4-part pros and cons. to help with decision making.    Objective #B  Patient will identify barriers that keep him from moving forward with his goals.    Status: New - Date: 7/8/2020     Intervention(s)  Therapist will assign homework to note barriers   teach problem-sovling model.    Objective #C  Patient will engage in problem solving to help him move forward with his interst.  Status: New - Date: 7/8/2020     Intervention(s)  Therapist will assign homework to carry out possible solutions  provide support to gauge how problem-solving is going and other ways to look at issues.      Patient has reviewed and agreed to the above plan.      Rossy Barnes, LETICIA  July 8, 2020

## 2020-07-20 ENCOUNTER — MYC MEDICAL ADVICE (OUTPATIENT)
Dept: FAMILY MEDICINE | Facility: CLINIC | Age: 50
End: 2020-07-20

## 2020-07-20 DIAGNOSIS — M79.10 MYALGIA: Primary | ICD-10-CM

## 2020-07-20 NOTE — TELEPHONE ENCOUNTER
To PCP:     Pt sends f/u message to [facility] on 7/15/20 regarding ongoing muscle pain in his arms.     Would you like VV to discuss?     Note: will re-route MyChart message from 7/15    Lissette AUGUSTIN RN

## 2020-07-24 ENCOUNTER — VIRTUAL VISIT (OUTPATIENT)
Dept: BEHAVIORAL HEALTH | Facility: CLINIC | Age: 50
End: 2020-07-24
Payer: COMMERCIAL

## 2020-07-24 DIAGNOSIS — F10.20 ALCOHOL USE DISORDER, MODERATE, DEPENDENCE (H): Primary | ICD-10-CM

## 2020-07-24 PROCEDURE — 90834 PSYTX W PT 45 MINUTES: CPT | Mod: GT | Performed by: SOCIAL WORKER

## 2020-07-24 NOTE — PROGRESS NOTES
Progress Note    Patient Name: Nick Montiel  Date: 7/24/2020         Service Type: Individual      Session Start Time: 14:10  Session End Time: 14:59     Session Length: 49    Session #: 4    Attendees: Client attended alone    Service Modality:  Video Visit:    Telemedicine Visit: The patient's condition can be safely assessed and treated via synchronous audio and visual telemedicine encounter.      Reason for Telemedicine Visit: Services only offered telehealth    Originating Site (Patient Location): Patient's home    Distant Site (Provider Location): Provider Remote Setting    Consent:  The patient/guardian has verbally consented to: the potential risks and benefits of telemedicine (video visit) versus in person care; bill my insurance or make self-payment for services provided; and responsibility for payment of non-covered services.     Patient would like the video invitation sent by: Send to e-mail at: [email or web address]     Mode of Communication:  Video Conference via [employer]    As the provider I attest to compliance with applicable laws and regulations related to telemedicine.     Treatment Plan Last Reviewed: 7/8/2020  PHQ-9 / DAPHNE-7 : 4/4 - 6/24/2020    DATA  Interactive Complexity: No  Crisis: No       Progress Since Last Session (Related to Symptoms / Goals / Homework):   Symptoms: No change patient reports being busy with work and is deciding on housing before he can think about school    Homework: Partially completed  Completed in session      Episode of Care Goals: Achieved / completed to satisfaction - PREPARATION (Decided to change - considering how); Intervened by negotiating a change plan and determining options / strategies for behavior change, identifying triggers, exploring social supports, and working towards setting a date to begin behavior change     Current / Ongoing Stressors and Concerns:   Patient reports that  has concerns  about drinking, that he is critical of others and patient is concerned with this last part as well.  He is also concerned with his lack of follow through and that he gives up on things easily and would like to be more engaged in projects versus coming home and drinking at night.  He discussed concerns that his son is drinking heavily and in a bad relationship.  He also reports that him and his  are no longer having sex.     Treatment Objective(s) Addressed in This Session:   Discuss negative thoughts and beliefs about self and others       Intervention:     Situation   Supporting son     Automatic Thoughts  Cognitive Distortions   I only got my life together once I was 45   Feelings   Grief     Behavior Giving up on dreams     Questioning Thoughts   My  was the first person to push me to look at my goals               ASSESSMENT: Current Emotional / Mental Status (status of significant symptoms):   Risk status (Self / Other harm or suicidal ideation)   Patient denies current fears or concerns for personal safety.   Patient denies current or recent suicidal ideation or behaviors.   Patient denies current or recent homicidal ideation or behaviors.   Patient denies current or recent self injurious behavior or ideation.   Patient denies other safety concerns.   Patient reports there has been no change in risk factors since their last session.     Patient reports there has been no change in protective factors since their last session.     Recommended that patient call 911 or go to the local ED should there be a change in any of these risk factors.     Appearance:   Appropriate    Eye Contact:   Good    Psychomotor Behavior: Normal    Attitude:   Cooperative  Interested Playful   Orientation:   All   Speech    Rate / Production: Talkative Normal     Volume:  Normal    Mood:    Irritable    Affect:    Appropriate    Thought Content:  Clear    Thought Form:  Coherent  Logical  Tangential    Insight:    Fair       Medication Review:   No current psychiatric medications prescribed     Medication Compliance:   NA     Changes in Health Issues:   Yes: Pain, Associated Psychological Distress     Chemical Use Review:   Substance Use: Problem use continues with no change since last session, Stage of Change: Preparatory        Tobacco Use: No current tobacco use.      Diagnosis:  1. Alcohol use disorder, moderate, dependence (H)        Collateral Reports Completed:   Not Applicable    PLAN: (Patient Tasks / Therapist Tasks / Other)  Patient will pay attention to though patterns for moving ahead with goals        LETICIA Naqvi                                                         ______________________________________________________________________    Treatment Plan    Patient's Name: Nick Montiel  YOB: 1970    Date: 7/8/2020    DSM5 Diagnoses: 296.89 Bipolar II Disorder Hypomanic or Substance-Related & Addictive Disorders Alcohol Use Disorder   303.90 (F10.20) Moderate continued use  Psychosocial / Contextual Factors:  Individual Factors has had problems with alcohol use his adult lifetime, has stolen items and engaged in high risk sexual behaivors more historically then recently, Family Factors Reports a negative relationship with mom and does not speak with dad.  His current partner is concerend about his alcohol use and his mood swings that cause anger, Community Factors he has experienced homelessness and has been engaged in the [employer] community and has performed drag and Economic Factors currently works as a  and is economically comfortable and has a history of difficulty maintaining employement.  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      Objective #A (Patient Action)    Patient will Identify negative self-talk and behaviors: challenge core beliefs, myths, and actions.  Status: New - Date: 7/8/2020     Intervention(s)  Therapist will assign homework to engage in cognitive restructuring  provide support to critically look at how negative beliefs keep you from moving forward .    Objective #B  Patient will identify core beliefs and the times in your life that originally created beliefs.  Status: New - Date: 7/8/2020     Intervention(s)  Therapist will assign homework to challenge core beliefs  provide educational materials on core beliefs and lifetime patterns  teach how to challenge core beliefs.    Objective #C  Patient will will be working towards his goals .  Status: New - Date: 7/8/2020     Intervention(s)  Therapist will assign homework to continue to use skills from therapy  provide support to negotiate what works and what patient continues to struggle with.      Goal 2: Patient will not be so negative     I will know I've met my goal when My  would tell me I am nicer, I would be able to define what it means to be a more positive person.      Objective #A (Patient Action)    Status: New - Date: 7/8/2020     Patient will identify feelings and emotions that occur prior to more negative behaviors.    Intervention(s)  Therapist will assign homework to track patient thoughts and feelings  provide educational materials on cognitive distortions  teach cognitive restructuring.    Objective #B  Patient will identify atleast 3 other responses that I can have other than negative.    Status: New - Date: 7/8/2020     Intervention(s)  Therapist will assign homework to practice other types of responses  provide educational materials on effective ways to  communicate with others  role-play more effective ways to communicate with others.    Objective #C  Patient will learn and demonstrate 3 assertiveness skill(s).  Status: New - Date: 7/8/2020     Intervention(s)  Therapist will assign homework to use assertive communication  role-play assertiveness skills.      Goal 3: Patient will find a hobby or interest    I will know I've met my goal when I go back to school and feel like I have things that are meaningful.      Objective #A (Patient Action)    Status: New - Date: 7/8/2020     Patient will Identify what he would like to do and steps that would get him there.    Intervention(s)  Therapist will assign homework to decide on steps to get towards goals  teach the client how to complete a 4-part pros and cons. to help with decision making.    Objective #B  Patient will identify barriers that keep him from moving forward with his goals.    Status: New - Date: 7/8/2020     Intervention(s)  Therapist will assign homework to note barriers   teach problem-sovling model.    Objective #C  Patient will engage in problem solving to help him move forward with his interst.  Status: New - Date: 7/8/2020     Intervention(s)  Therapist will assign homework to carry out possible solutions  provide support to gauge how problem-solving is going and other ways to look at issues.      Patient has reviewed and agreed to the above plan.      LETICIA Naqvi  July 8, 2020

## 2020-07-28 DIAGNOSIS — J45.20 MILD INTERMITTENT ASTHMA WITHOUT COMPLICATION: ICD-10-CM

## 2020-07-28 DIAGNOSIS — J30.9 ALLERGIC RHINITIS, UNSPECIFIED SEASONALITY, UNSPECIFIED TRIGGER: ICD-10-CM

## 2020-07-28 NOTE — TELEPHONE ENCOUNTER
Refill request:    MONTELUKAST 10 MG TAB    Summary: Take 1 tablet (10 mg) by mouth At Bedtime, Disp-30 tablet,R-3, E-Prescribe   Dose, Route, Frequency: 10 mg, Oral, AT BEDTIME  Start: 3/10/2020  Ord/Sold: 3/10/2020

## 2020-07-29 ENCOUNTER — VIRTUAL VISIT (OUTPATIENT)
Dept: BEHAVIORAL HEALTH | Facility: CLINIC | Age: 50
End: 2020-07-29
Payer: COMMERCIAL

## 2020-07-29 DIAGNOSIS — F10.20 ALCOHOL USE DISORDER, MODERATE, DEPENDENCE (H): Primary | ICD-10-CM

## 2020-07-29 PROCEDURE — 90834 PSYTX W PT 45 MINUTES: CPT | Mod: GT | Performed by: SOCIAL WORKER

## 2020-07-29 RX ORDER — MONTELUKAST SODIUM 10 MG/1
10 TABLET ORAL AT BEDTIME
Qty: 30 TABLET | Refills: 0 | Status: SHIPPED | OUTPATIENT
Start: 2020-07-29 | End: 2020-09-01

## 2020-07-29 NOTE — PROGRESS NOTES
Progress Note    Patient Name: Nick Montiel  Date: 7/29/2020         Service Type: Individual      Session Start Time: 14:03  Session End Time: 14:48     Session Length: 45    Session #: 5    Attendees: Client attended alone    Service Modality:  Video Visit:    Telemedicine Visit: The patient's condition can be safely assessed and treated via synchronous audio and visual telemedicine encounter.      Reason for Telemedicine Visit: Services only offered telehealth    Originating Site (Patient Location): Patient's home    Distant Site (Provider Location): Provider Remote Setting    Consent:  The patient/guardian has verbally consented to: the potential risks and benefits of telemedicine (video visit) versus in person care; bill my insurance or make self-payment for services provided; and responsibility for payment of non-covered services.     Patient would like the video invitation sent by: Send to e-mail at: [email or web address]     Mode of Communication:  Video Conference via [employer]    As the provider I attest to compliance with applicable laws and regulations related to telemedicine.     Treatment Plan Last Reviewed: 7/8/2020  PHQ-9 / DAPHNE-7 : 4/4 - 6/24/2020    DATA  Interactive Complexity: No  Crisis: No       Progress Since Last Session (Related to Symptoms / Goals / Homework):   Symptoms: No change Patient discussed fight with boyfriend    Homework: Achieved / completed to satisfaction      Episode of Care Goals: Achieved / completed to satisfaction - PREPARATION (Decided to change - considering how); Intervened by negotiating a change plan and determining options / strategies for behavior change, identifying triggers, exploring social supports, and working towards setting a date to begin behavior change     Current / Ongoing Stressors and Concerns:   Patient reports that  has concerns that he is critical of others and that patient is concerned  with this last part as well.  He also reports that him and his  are no longer having sex and that when he attempts to have boundaries with his  that he becomes sad.     Treatment Objective(s) Addressed in This Session:   learn & utilize at least 1 assertive communication skills weekly       Intervention:   CBT: Discussed thoughts, feelings, behaviors with fight.  Discussed how patient attempts to set boundaries and discussed how he can set boundaries in ways that also show his care and concern towards his .        ASSESSMENT: Current Emotional / Mental Status (status of significant symptoms):   Risk status (Self / Other harm or suicidal ideation)   Patient denies current fears or concerns for personal safety.   Patient denies current or recent suicidal ideation or behaviors.   Patient denies current or recent homicidal ideation or behaviors.   Patient denies current or recent self injurious behavior or ideation.   Patient denies other safety concerns.   Patient reports there has been no change in risk factors since their last session.     Patient reports there has been no change in protective factors since their last session.     Recommended that patient call 911 or go to the local ED should there be a change in any of these risk factors.     Appearance:   Appropriate    Eye Contact:   Good    Psychomotor Behavior: Normal    Attitude:   Cooperative  Interested Playful   Orientation:   All   Speech    Rate / Production: Talkative Normal     Volume:  Normal    Mood:    Irritable    Affect:    Appropriate    Thought Content:  Clear    Thought Form:  Coherent  Logical  Tangential    Insight:    Fair      Medication Review:   No current psychiatric medications prescribed     Medication Compliance:   NA     Changes in Health Issues:   Yes: Pain, Associated Psychological Distress     Chemical Use Review:   Substance Use: Problem use continues with no change since last session, Stage of Change:  Preparatory        Tobacco Use: No current tobacco use.      Diagnosis:  1. Alcohol use disorder, moderate, dependence (H)        Collateral Reports Completed:   Not Applicable    PLAN: (Patient Tasks / Therapist Tasks / Other)  Patient will set boundary with  that also shows that he cares about his         Rossy Barnes, LICSW                                                         ______________________________________________________________________    Treatment Plan    Patient's Name: Nick Montiel  YOB: 1970    Date: 7/8/2020    DSM5 Diagnoses: 296.89 Bipolar II Disorder Hypomanic or Substance-Related & Addictive Disorders Alcohol Use Disorder   303.90 (F10.20) Moderate continued use  Psychosocial / Contextual Factors:  Individual Factors has had problems with alcohol use his adult lifetime, has stolen items and engaged in high risk sexual behaivors more historically then recently, Family Factors Reports a negative relationship with mom and does not speak with dad.  His current partner is concerend about his alcohol use and his mood swings that cause anger, Community Factors he has experienced homelessness and has been engaged in the [employer] community and has performed drag and Economic Factors currently works as a  and is economically comfortable and has a history of difficulty maintaining employement.  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      Objective #A (Patient Action)    Patient will Identify negative self-talk and behaviors:  challenge core beliefs, myths, and actions.  Status: New - Date: 7/8/2020     Intervention(s)  Therapist will assign homework to engage in cognitive restructuring  provide support to critically look at how negative beliefs keep you from moving forward .    Objective #B  Patient will identify core beliefs and the times in your life that originally created beliefs.  Status: New - Date: 7/8/2020     Intervention(s)  Therapist will assign homework to challenge core beliefs  provide educational materials on core beliefs and lifetime patterns  teach how to challenge core beliefs.    Objective #C  Patient will will be working towards his goals .  Status: New - Date: 7/8/2020     Intervention(s)  Therapist will assign homework to continue to use skills from therapy  provide support to negotiate what works and what patient continues to struggle with.      Goal 2: Patient will not be so negative     I will know I've met my goal when My  would tell me I am nicer, I would be able to define what it means to be a more positive person.      Objective #A (Patient Action)    Status: New - Date: 7/8/2020     Patient will identify feelings and emotions that occur prior to more negative behaviors.    Intervention(s)  Therapist will assign homework to track patient thoughts and feelings  provide educational materials on cognitive distortions  teach cognitive restructuring.    Objective #B  Patient will identify atleast 3 other responses that I can have other than negative.    Status: New - Date: 7/8/2020     Intervention(s)  Therapist will assign homework to practice other types of responses  provide educational materials on effective ways to communicate with others  role-play more effective ways to communicate with others.    Objective #C  Patient will learn and demonstrate 3 assertiveness skill(s).  Status: New - Date: 7/8/2020     Intervention(s)  Therapist will assign homework to use assertive communication  role-play  assertiveness skills.      Goal 3: Patient will find a hobby or interest    I will know I've met my goal when I go back to school and feel like I have things that are meaningful.      Objective #A (Patient Action)    Status: New - Date: 7/8/2020     Patient will Identify what he would like to do and steps that would get him there.    Intervention(s)  Therapist will assign homework to decide on steps to get towards goals  teach the client how to complete a 4-part pros and cons. to help with decision making.    Objective #B  Patient will identify barriers that keep him from moving forward with his goals.    Status: New - Date: 7/8/2020     Intervention(s)  Therapist will assign homework to note barriers   teach problem-sovling model.    Objective #C  Patient will engage in problem solving to help him move forward with his interst.  Status: New - Date: 7/8/2020     Intervention(s)  Therapist will assign homework to carry out possible solutions  provide support to gauge how problem-solving is going and other ways to look at issues.      Patient has reviewed and agreed to the above plan.      LETICIA Naqvi  July 8, 2020

## 2020-08-06 ENCOUNTER — OFFICE VISIT (OUTPATIENT)
Dept: FAMILY MEDICINE | Facility: CLINIC | Age: 50
End: 2020-08-06
Payer: COMMERCIAL

## 2020-08-06 VITALS
SYSTOLIC BLOOD PRESSURE: 123 MMHG | HEART RATE: 67 BPM | OXYGEN SATURATION: 95 % | BODY MASS INDEX: 26.84 KG/M2 | DIASTOLIC BLOOD PRESSURE: 81 MMHG | TEMPERATURE: 98.6 F | HEIGHT: 67 IN | WEIGHT: 171 LBS

## 2020-08-06 DIAGNOSIS — M75.52 SUBACROMIAL BURSITIS OF BOTH SHOULDERS: Primary | ICD-10-CM

## 2020-08-06 DIAGNOSIS — E78.5 HYPERLIPIDEMIA LDL GOAL <160: ICD-10-CM

## 2020-08-06 DIAGNOSIS — M75.51 SUBACROMIAL BURSITIS OF BOTH SHOULDERS: Primary | ICD-10-CM

## 2020-08-06 PROCEDURE — 80061 LIPID PANEL: CPT | Performed by: INTERNAL MEDICINE

## 2020-08-06 PROCEDURE — 36415 COLL VENOUS BLD VENIPUNCTURE: CPT | Performed by: INTERNAL MEDICINE

## 2020-08-06 PROCEDURE — 82306 VITAMIN D 25 HYDROXY: CPT | Performed by: INTERNAL MEDICINE

## 2020-08-06 PROCEDURE — 99214 OFFICE O/P EST MOD 30 MIN: CPT | Performed by: INTERNAL MEDICINE

## 2020-08-06 RX ORDER — METHYLPREDNISOLONE 4 MG
TABLET, DOSE PACK ORAL
Qty: 21 TABLET | Refills: 0 | Status: SHIPPED | OUTPATIENT
Start: 2020-08-06 | End: 2020-11-09

## 2020-08-06 ASSESSMENT — MIFFLIN-ST. JEOR: SCORE: 1594.28

## 2020-08-06 NOTE — PROGRESS NOTES
"Subjective     Nick Delfin Montiel is a 50 year old male who presents to clinic today for the following health issues:    HPI     Patient complains of bilateral arm pain.  He started taking atorvastatin for hyperlipidemia on 6/2/2020 but stopped it 2 weeks later due to muscle ache.      History reviewed. No pertinent past medical history.      Review of Systems   Musculoskeletal: Negative for neck pain.   Neurological: Negative for weakness and numbness.       /81 (BP Location: Right arm, Cuff Size: Adult Regular)   Pulse 67   Temp 98.6  F (37  C) (Tympanic)   Ht 1.702 m (5' 7\")   Wt 77.6 kg (171 lb)   SpO2 95%   BMI 26.78 kg/m        Physical Exam  Constitutional:       General: He is not in acute distress.     Appearance: He is well-developed.   Neck:      Musculoskeletal: Normal range of motion.      Thyroid: No thyromegaly.   Musculoskeletal: Normal range of motion.         General: Tenderness (And subacromial space of both shoulders) present. No edema.      Comments: Pain at both shoulders on abduction of arms past 90 degrees   Neurological:      Mental Status: He is alert and oriented to person, place, and time.      Comments: Intact motor and sensory function of upper extremities           ICD-10-CM    1. Subacromial bursitis of both shoulders  M75.51 methylPREDNISolone (MEDROL DOSEPAK) 4 MG tablet therapy pack    M75.52    2. Hyperlipidemia LDL goal <160  E78.5 Lipid panel reflex to direct LDL Fasting       Patient Instructions   Take Medrol dose pack as directed (take with food) and perform recommended exercises.    Avoid any lifting, pushing, pulling, or any activity that would strain your left shoulder.    Call doctor if your shoulder pain persist/worsens, or if you develop new symptoms or side effects from the medication.          Patient Education     Shoulder Impingement Syndrome  The rotator cuff is a group of muscles and tendons that surround the shoulder joint. These muscles and tendons " hold the arm in its joint. They help the shoulder move. The rotator cuff muscles and tendons can become irritated from repeated rubbing against the shoulder bone. This is called shoulder impingement syndrome or rotator cuff tendonitis.     If your case is mild, you may only need to rest the shoulder and then do certain exercises to strengthen the muscles. You can also take anti-inflammatory medicines. Steroid injections into the shoulder can ease inflammation. But you can have only a limited number of these. If the condition gets worse, your shoulder muscles may become thin and weak. This can lead to a rotator cuff tear.  Symptoms of shoulder impingement syndrome may include:    Shoulder pain that gets worse when you raise your arm overhead    Weakness of the shoulder muscles when you use your arm overhead    Popping and clicking when you move your shoulder    Shoulder pain that wakes you up at night, especially when you sleep on the affected shoulder    Sudden pain in your shoulder when you lift or reach  Home care  Follow these tips to take care of yourself at home:    Avoid activities that make your pain worse. These include raising your arms overhead, repeating the same motion over and over, or lifting heavy objects.    Don t hold your arm in one position for a long time. Keep it moving.    Put an ice pack on the sore area for 20 minutes every 1 to 2 hours for the first day. You can make an ice pack by putting ice cubes in a plastic bag. Wrap the bag in a towel before putting it on your shoulder. A frozen bag of peas or something similar can also be used as an ice pack. Use the ice packs 3 to 4 times a day for the next 2 days. Continue using the ice to relieve of pain and swelling as needed.    You may take acetaminophen or ibuprofen to control pain, unless another medicine was prescribed. If prednisone was prescribed, don t take anti-inflammatory medicines. If you have chronic liver or kidney disease or ever  had a stomach ulcer or gastrointestinal bleeding, talk with your doctor before using these medicines.    After your symptoms ease, you may get physical therapy or start a home exercise program. This can strengthen your shoulder muscles and help your range of motion. Talk with your doctor about what is best for your condition.  Follow-up care  Follow up with your healthcare provider, or as advised.  When to seek medical advice  Call your healthcare provider right away if any of these occur:    Shoulder pain that gets worse and wakes you up at night    Your shoulder or arm swells    Numbness, tingling, or pain that travels down the arm to the hand    Loss of shoulder strength    Fever or chills  Date Last Reviewed: 8/1/2016 2000-2019 [employer]. [address] [place] [address], [place] [address]. All rights reserved. This information is not intended as a substitute for professional medical care. Always follow your healthcare professional's instructions.           Patient Education     Understanding Shoulder Impingement Syndrome    Shoulder impingement syndrome is a problem with the shoulder joint. It occurs when certain parts within the joint swell and are pinched. This can cause nagging pain and problems with moving the arm.  What causes shoulder impingement syndrome?  It is possible to develop impingement after years of normal shoulder use. But in most cases the condition occurs because of repeated overhead movements. These include such things as stocking shelves, painting, swimming, and throwing. These movements can irritate parts of the shoulder, leading to swelling. Swollen parts of the shoulder take up more room, making the joint space smaller. Some parts that may be involved include:    A sac of fluid (bursa) that cushions the shoulder joint.  When the bursa is irritated, it may lead to a condition called bursitis. This is when the bursa swells with fluid, filling and squeezing the joint  space.    Fibrous tissues (tendons) that connect muscle to bone. When tendons are irritated, they may become swollen. This is called tendonitis.    The end (acromion) of the shoulder blade. This bone may be flat or hooked. If the acromion is hooked, the joint space may be smaller than normal. Growths (bone spurs) on the acromion can also narrow the joint space. Acromion problems don t often cause impingement. But they can make it worse.  Symptoms of shoulder impingement syndrome  Symptoms include pain, pinching, or stiffness in your shoulder. Pain often comes with movement, particularly reaching overhead or backward. It may also be felt when the shoulder is at rest. Pain at night during sleep is common.  Treatment for shoulder impingement syndrome  Treatment will depend on the cause of the problem, how bad the problem is, and if other parts of the shoulder are damaged. Treatment may include the following:    Active rest. This allows the shoulder to heal. It means using the arm and shoulder, but avoiding activities that cause pain. These likely include reaching overhead or sleeping on your shoulder.    Cold packs. These help reduce swelling and relieve pain.    Prescription or over-the-counter medicines. These help relieve pain and swelling. NSAIDs (nonsteroidal anti-inflammatory drugs) are the most common medicines used. Medicines may be prescribed or bought over-the-counter. They may be given as pills. Or they may be applied to the skin in the form of a gel, cream, or patch.    Arm and shoulder exercises. These help keep your shoulder joint mobile as it heals. They also help improve muscle strength around the joint.    Shots of medicine into the joint. This can help reduce swelling and pain for a time. The medicine used is usually a corticosteroid, (a powerful anti-inflammatory).  If other measures don t work to relieve symptoms, you may need surgery. This opens up space in the joint to allow pain-free  motion.  Possible complications of shoulder impingement syndrome  It might be tempting to stop using your shoulder completely to avoid pain. But doing so may lead to a condition called frozen shoulder. To help prevent this, follow instructions you are given for active rest and for doing exercises to help your shoulder heal.  When to call your healthcare provider  Call your healthcare provider right away if you have any of these:    Fever of 100.4 F (38 C) or higher, chills, oras directed    Symptoms that don t get better, or get worse    New symptoms  Date Last Reviewed: 3/10/2016    8001-5571 [employer]. [address] [place] [id number]. All rights reserved. This information is not intended as a substitute for professional medical care. Always follow your healthcare professional's instructions.

## 2020-08-06 NOTE — PATIENT INSTRUCTIONS
Take Medrol dose pack as directed (take with food) and perform recommended exercises.    Avoid any lifting, pushing, pulling, or any activity that would strain your left shoulder.    Call doctor if your shoulder pain persist/worsens, or if you develop new symptoms or side effects from the medication.          Patient Education     Shoulder Impingement Syndrome  The rotator cuff is a group of muscles and tendons that surround the shoulder joint. These muscles and tendons hold the arm in its joint. They help the shoulder move. The rotator cuff muscles and tendons can become irritated from repeated rubbing against the shoulder bone. This is called shoulder impingement syndrome or rotator cuff tendonitis.     If your case is mild, you may only need to rest the shoulder and then do certain exercises to strengthen the muscles. You can also take anti-inflammatory medicines. Steroid injections into the shoulder can ease inflammation. But you can have only a limited number of these. If the condition gets worse, your shoulder muscles may become thin and weak. This can lead to a rotator cuff tear.  Symptoms of shoulder impingement syndrome may include:    Shoulder pain that gets worse when you raise your arm overhead    Weakness of the shoulder muscles when you use your arm overhead    Popping and clicking when you move your shoulder    Shoulder pain that wakes you up at night, especially when you sleep on the affected shoulder    Sudden pain in your shoulder when you lift or reach  Home care  Follow these tips to take care of yourself at home:    Avoid activities that make your pain worse. These include raising your arms overhead, repeating the same motion over and over, or lifting heavy objects.    Don t hold your arm in one position for a long time. Keep it moving.    Put an ice pack on the sore area for 20 minutes every 1 to 2 hours for the first day. You can make an ice pack by putting ice cubes in a plastic bag. Wrap the  bag in a towel before putting it on your shoulder. A frozen bag of peas or something similar can also be used as an ice pack. Use the ice packs 3 to 4 times a day for the next 2 days. Continue using the ice to relieve of pain and swelling as needed.    You may take acetaminophen or ibuprofen to control pain, unless another medicine was prescribed. If prednisone was prescribed, don t take anti-inflammatory medicines. If you have chronic liver or kidney disease or ever had a stomach ulcer or gastrointestinal bleeding, talk with your doctor before using these medicines.    After your symptoms ease, you may get physical therapy or start a home exercise program. This can strengthen your shoulder muscles and help your range of motion. Talk with your doctor about what is best for your condition.  Follow-up care  Follow up with your healthcare provider, or as advised.  When to seek medical advice  Call your healthcare provider right away if any of these occur:    Shoulder pain that gets worse and wakes you up at night    Your shoulder or arm swells    Numbness, tingling, or pain that travels down the arm to the hand    Loss of shoulder strength    Fever or chills  Date Last Reviewed: 8/1/2016 2000-2019 [employer]. [address] [place], [place] [id number]. All rights reserved. This information is not intended as a substitute for professional medical care. Always follow your healthcare professional's instructions.           Patient Education     Understanding Shoulder Impingement Syndrome    Shoulder impingement syndrome is a problem with the shoulder joint. It occurs when certain parts within the joint swell and are pinched. This can cause nagging pain and problems with moving the arm.  What causes shoulder impingement syndrome?  It is possible to develop impingement after years of normal shoulder use. But in most cases the condition occurs because of repeated overhead movements. These include such things  as stocking shelves, painting, swimming, and throwing. These movements can irritate parts of the shoulder, leading to swelling. Swollen parts of the shoulder take up more room, making the joint space smaller. Some parts that may be involved include:    A sac of fluid (bursa) that cushions the shoulder joint.  When the bursa is irritated, it may lead to a condition called bursitis. This is when the bursa swells with fluid, filling and squeezing the joint space.    Fibrous tissues (tendons) that connect muscle to bone. When tendons are irritated, they may become swollen. This is called tendonitis.    The end (acromion) of the shoulder blade. This bone may be flat or hooked. If the acromion is hooked, the joint space may be smaller than normal. Growths (bone spurs) on the acromion can also narrow the joint space. Acromion problems don t often cause impingement. But they can make it worse.  Symptoms of shoulder impingement syndrome  Symptoms include pain, pinching, or stiffness in your shoulder. Pain often comes with movement, particularly reaching overhead or backward. It may also be felt when the shoulder is at rest. Pain at night during sleep is common.  Treatment for shoulder impingement syndrome  Treatment will depend on the cause of the problem, how bad the problem is, and if other parts of the shoulder are damaged. Treatment may include the following:    Active rest. This allows the shoulder to heal. It means using the arm and shoulder, but avoiding activities that cause pain. These likely include reaching overhead or sleeping on your shoulder.    Cold packs. These help reduce swelling and relieve pain.    Prescription or over-the-counter medicines. These help relieve pain and swelling. NSAIDs (nonsteroidal anti-inflammatory drugs) are the most common medicines used. Medicines may be prescribed or bought over-the-counter. They may be given as pills. Or they may be applied to the skin in the form of a gel, cream,  or patch.    Arm and shoulder exercises. These help keep your shoulder joint mobile as it heals. They also help improve muscle strength around the joint.    Shots of medicine into the joint. This can help reduce swelling and pain for a time. The medicine used is usually a corticosteroid, (a powerful anti-inflammatory).  If other measures don t work to relieve symptoms, you may need surgery. This opens up space in the joint to allow pain-free motion.  Possible complications of shoulder impingement syndrome  It might be tempting to stop using your shoulder completely to avoid pain. But doing so may lead to a condition called frozen shoulder. To help prevent this, follow instructions you are given for active rest and for doing exercises to help your shoulder heal.  When to call your healthcare provider  Call your healthcare provider right away if you have any of these:    Fever of 100.4 F (38 C) or higher, chills, oras directed    Symptoms that don t get better, or get worse    New symptoms  Date Last Reviewed: 3/10/2016    0373-5442 [employer]. [address] [place] [address], [place] [address]. All rights reserved. This information is not intended as a substitute for professional medical care. Always follow your healthcare professional's instructions.

## 2020-08-07 LAB
CHOLEST SERPL-MCNC: 228 MG/DL
DEPRECATED CALCIDIOL+CALCIFEROL SERPL-MC: 25 UG/L (ref 20–75)
HDLC SERPL-MCNC: 34 MG/DL
LDLC SERPL CALC-MCNC: 158 MG/DL
NONHDLC SERPL-MCNC: 194 MG/DL
TRIGL SERPL-MCNC: 181 MG/DL

## 2020-08-07 ASSESSMENT — ASTHMA QUESTIONNAIRES: ACT_TOTALSCORE: 23

## 2020-08-12 ENCOUNTER — VIRTUAL VISIT (OUTPATIENT)
Dept: BEHAVIORAL HEALTH | Facility: CLINIC | Age: 50
End: 2020-08-12
Payer: COMMERCIAL

## 2020-08-12 DIAGNOSIS — F10.20 ALCOHOL USE DISORDER, MODERATE, DEPENDENCE (H): Primary | ICD-10-CM

## 2020-08-12 PROCEDURE — 90834 PSYTX W PT 45 MINUTES: CPT | Mod: GT | Performed by: SOCIAL WORKER

## 2020-08-12 NOTE — PROGRESS NOTES
Progress Note    Patient Name: Nick Montiel  Date: 8/12/2020         Service Type: Individual      Session Start Time: 9:30  Session End Time: 10:22     Session Length: 52    Session #: 6    Attendees: Client attended alone    Service Modality:  Video Visit:    Telemedicine Visit: The patient's condition can be safely assessed and treated via synchronous audio and visual telemedicine encounter.      Reason for Telemedicine Visit: Services only offered telehealth    Originating Site (Patient Location): Patient's home    Distant Site (Provider Location): Provider Remote Setting    Consent:  The patient/guardian has verbally consented to: the potential risks and benefits of telemedicine (video visit) versus in person care; bill my insurance or make self-payment for services provided; and responsibility for payment of non-covered services.     Patient would like the video invitation sent by: Send to e-mail at: [email or web address]     Mode of Communication:  Video Conference via [employer]    As the provider I attest to compliance with applicable laws and regulations related to telemedicine.     Treatment Plan Last Reviewed: 7/8/2020  PHQ-9 / DAPHNE-7 : 4/4 - 6/24/2020    DATA  Interactive Complexity: No  Crisis: No       Progress Since Last Session (Related to Symptoms / Goals / Homework):   Symptoms: Worsening patient reports continued fights with     Homework: Achieved / completed to satisfaction      Episode of Care Goals: Achieved / completed to satisfaction - PREPARATION (Decided to change - considering how); Intervened by negotiating a change plan and determining options / strategies for behavior change, identifying triggers, exploring social supports, and working towards setting a date to begin behavior change     Current / Ongoing Stressors and Concerns:   Patient reports concerns related to him and his 's communication.  That he is unsure if  " is happy and he is concerned that he is growing tired in the relationship.    Writer R/O Bipolar II disorder based on conversation about patient being grumpy and when this is occurring versus this being something that is true for patient all the time.  Mood seems more related to patient's relationship.     Treatment Objective(s) Addressed in This Session:   learn & utilize at least 1 assertive communication skills weekly       Intervention:   CBT: discussed the use of \"I\" statements and explored ways with patient to address communication concerns in relationship        ASSESSMENT: Current Emotional / Mental Status (status of significant symptoms):   Risk status (Self / Other harm or suicidal ideation)   Patient denies current fears or concerns for personal safety.   Patient denies current or recent suicidal ideation or behaviors.   Patient denies current or recent homicidal ideation or behaviors.   Patient denies current or recent self injurious behavior or ideation.   Patient denies other safety concerns.   Patient reports there has been no change in risk factors since their last session.     Patient reports there has been no change in protective factors since their last session.     Recommended that patient call 911 or go to the local ED should there be a change in any of these risk factors.     Appearance:   Appropriate    Eye Contact:   Fair    Psychomotor Behavior: Normal    Attitude:   Cooperative  Interested   Orientation:   All   Speech    Rate / Production: Normal/ Responsive    Volume:  Normal    Mood:    Sad    Affect:    Subdued    Thought Content:  Clear    Thought Form:  Coherent  Logical  Circumstantial   Insight:    Fair      Medication Review:   No current psychiatric medications prescribed     Medication Compliance:   NA     Changes in Health Issues:   Yes: Pain, Associated Psychological Distress     Chemical Use Review:   Substance Use: Problem use continues with no change since last " session, Stage of Change: Preparatory        Tobacco Use: No current tobacco use.      Diagnosis:  1. Alcohol use disorder, moderate, dependence (H)        Collateral Reports Completed:   Not Applicable    PLAN: (Patient Tasks / Therapist Tasks / Other)  Patient will set boundary with  that also shows that he cares about his         Rossy Barnes, LICSW                                                         ______________________________________________________________________    Treatment Plan    Patient's Name: Nick Montiel  YOB: 1970    Date: 7/8/2020    DSM5 Diagnoses: Substance-Related & Addictive Disorders Alcohol Use Disorder   303.90 (F10.20) Moderate continued use  Psychosocial / Contextual Factors:  Individual Factors has had problems with alcohol use his adult lifetime, has stolen items and engaged in high risk sexual behaivors more historically then recently, Family Factors Reports a negative relationship with mom and does not speak with dad.  His current partner is concerend about his alcohol use and his mood swings that cause anger, Community Factors he has experienced homelessness and has been engaged in the [facility] community and has performed drag and Economic Factors currently works as a  and is economically comfortable and has a history of difficulty maintaining employement.  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      Objective #A (Patient Action)    Patient will Identify negative self-talk and behaviors: challenge core  beliefs, myths, and actions.  Status: New - Date: 7/8/2020     Intervention(s)  Therapist will assign homework to engage in cognitive restructuring  provide support to critically look at how negative beliefs keep you from moving forward .    Objective #B  Patient will identify core beliefs and the times in your life that originally created beliefs.  Status: New - Date: 7/8/2020     Intervention(s)  Therapist will assign homework to challenge core beliefs  provide educational materials on core beliefs and lifetime patterns  teach how to challenge core beliefs.    Objective #C  Patient will will be working towards his goals .  Status: New - Date: 7/8/2020     Intervention(s)  Therapist will assign homework to continue to use skills from therapy  provide support to negotiate what works and what patient continues to struggle with.      Goal 2: Patient will not be so negative     I will know I've met my goal when My  would tell me I am nicer, I would be able to define what it means to be a more positive person.      Objective #A (Patient Action)    Status: New - Date: 7/8/2020     Patient will identify feelings and emotions that occur prior to more negative behaviors.    Intervention(s)  Therapist will assign homework to track patient thoughts and feelings  provide educational materials on cognitive distortions  teach cognitive restructuring.    Objective #B  Patient will identify atleast 3 other responses that I can have other than negative.    Status: New - Date: 7/8/2020     Intervention(s)  Therapist will assign homework to practice other types of responses  provide educational materials on effective ways to communicate with others  role-play more effective ways to communicate with others.    Objective #C  Patient will learn and demonstrate 3 assertiveness skill(s).  Status: New - Date: 7/8/2020     Intervention(s)  Therapist will assign homework to use assertive communication  role-play assertiveness  skills.      Goal 3: Patient will find a hobby or interest    I will know I've met my goal when I go back to school and feel like I have things that are meaningful.      Objective #A (Patient Action)    Status: New - Date: 7/8/2020     Patient will Identify what he would like to do and steps that would get him there.    Intervention(s)  Therapist will assign homework to decide on steps to get towards goals  teach the client how to complete a 4-part pros and cons. to help with decision making.    Objective #B  Patient will identify barriers that keep him from moving forward with his goals.    Status: New - Date: 7/8/2020     Intervention(s)  Therapist will assign homework to note barriers   teach problem-sovling model.    Objective #C  Patient will engage in problem solving to help him move forward with his interst.  Status: New - Date: 7/8/2020     Intervention(s)  Therapist will assign homework to carry out possible solutions  provide support to gauge how problem-solving is going and other ways to look at issues.      Patient has reviewed and agreed to the above plan.      Rossy Barnes, LETICIA  July 8, 2020

## 2020-08-19 ENCOUNTER — MYC MEDICAL ADVICE (OUTPATIENT)
Dept: FAMILY MEDICINE | Facility: CLINIC | Age: 50
End: 2020-08-19

## 2020-08-19 ENCOUNTER — VIRTUAL VISIT (OUTPATIENT)
Dept: BEHAVIORAL HEALTH | Facility: CLINIC | Age: 50
End: 2020-08-19
Payer: COMMERCIAL

## 2020-08-19 DIAGNOSIS — M79.10 MYALGIA: ICD-10-CM

## 2020-08-19 DIAGNOSIS — M75.51 SUBACROMIAL BURSITIS OF BOTH SHOULDERS: Primary | ICD-10-CM

## 2020-08-19 DIAGNOSIS — F10.20 ALCOHOL USE DISORDER, MODERATE, DEPENDENCE (H): Primary | ICD-10-CM

## 2020-08-19 DIAGNOSIS — M75.52 SUBACROMIAL BURSITIS OF BOTH SHOULDERS: Primary | ICD-10-CM

## 2020-08-19 PROCEDURE — 90834 PSYTX W PT 45 MINUTES: CPT | Mod: GT | Performed by: SOCIAL WORKER

## 2020-08-19 NOTE — PROGRESS NOTES
Progress Note    Patient Name: Nick Montiel  Date: 8/19/2020         Service Type: Individual      Session Start Time: 16:01  Session End Time: 16:53     Session Length: 52    Session #: 7    Attendees: Client attended alone    Service Modality:  Video Visit:    Telemedicine Visit: The patient's condition can be safely assessed and treated via synchronous audio and visual telemedicine encounter.      Reason for Telemedicine Visit: Services only offered telehealth    Originating Site (Patient Location): Patient's home    Distant Site (Provider Location): Provider Remote Setting    Consent:  The patient/guardian has verbally consented to: the potential risks and benefits of telemedicine (video visit) versus in person care; bill my insurance or make self-payment for services provided; and responsibility for payment of non-covered services.     Patient would like the video invitation sent by: Send to e-mail at: [email or web address]     Mode of Communication:  Video Conference via [employer]    As the provider I attest to compliance with applicable laws and regulations related to telemedicine.     Treatment Plan Last Reviewed: 7/8/2020  PHQ-9 / DAPHNE-7 : 4/4 - 6/24/2020    DATA  Interactive Complexity: No  Crisis: No       Progress Since Last Session (Related to Symptoms / Goals / Homework):   Symptoms: Improving reports having time off and spending time working on buying house with .    Homework: Achieved / completed to satisfaction      Episode of Care Goals: Achieved / completed to satisfaction - PREPARATION (Decided to change - considering how); Intervened by negotiating a change plan and determining options / strategies for behavior change, identifying triggers, exploring social supports, and working towards setting a date to begin behavior change     Current / Ongoing Stressors and Concerns:   Patient reports acknowledging his own irritability as a factor  in problems in his relationship and having more openness to discuss this.  Patient reports that his  is open to couple's counseling and would like a referral.       Treatment Objective(s) Addressed in This Session:   identify patterns of escalation  (i.e. tightness in chest, flushed face, increased heart rate, clenched hands, etc.)       Intervention:   CBT: patient and writer discussed how patient should note times that he becomes angry in the next two weeks.  That he should write down the situation, his thoughts and how it felt in his body at that time.        ASSESSMENT: Current Emotional / Mental Status (status of significant symptoms):   Risk status (Self / Other harm or suicidal ideation)   Patient denies current fears or concerns for personal safety.   Patient denies current or recent suicidal ideation or behaviors.   Patient denies current or recent homicidal ideation or behaviors.   Patient denies current or recent self injurious behavior or ideation.   Patient denies other safety concerns.   Patient reports there has been no change in risk factors since their last session.     Patient reports there has been no change in protective factors since their last session.     Recommended that patient call 911 or go to the local ED should there be a change in any of these risk factors.     Appearance:   Appropriate    Eye Contact:   Fair    Psychomotor Behavior: Normal    Attitude:   Cooperative  Interested   Orientation:   All   Speech    Rate / Production: Normal/ Responsive    Volume:  Normal    Mood:    Normal   Affect:    Bright    Thought Content:  Clear    Thought Form:  Coherent  Logical  Circumstantial   Insight:    Fair      Medication Review:   No current psychiatric medications prescribed     Medication Compliance:   NA     Changes in Health Issues:   None reported     Chemical Use Review:   Substance Use: Problem use continues with no change since last session, Stage of Change: Preparatory         Tobacco Use: No current tobacco use.      Diagnosis:  1. Alcohol use disorder, moderate, dependence (H)        Collateral Reports Completed:   Not Applicable    PLAN: (Patient Tasks / Therapist Tasks / Other)  Patient will write down times that he becomes angry with    (situation/thought/his physiological response)        Rossy Barnes, ANNAMARIESW                                                         ______________________________________________________________________    Treatment Plan    Patient's Name: Nick Montiel  YOB: 1970    Date: 7/8/2020    DSM5 Diagnoses: Substance-Related & Addictive Disorders Alcohol Use Disorder   303.90 (F10.20) Moderate continued use  Psychosocial / Contextual Factors:  Individual Factors has had problems with alcohol use his adult lifetime, has stolen items and engaged in high risk sexual behaivors more historically then recently, Family Factors Reports a negative relationship with mom and does not speak with dad.  His current partner is concerend about his alcohol use and his mood swings that cause anger, Community Factors he has experienced homelessness and has been engaged in the [employer] community and has performed drag and Economic Factors currently works as a  and is economically comfortable and has a history of difficulty maintaining employement.  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      Objective #A (Patient Action)    Patient will Identify negative self-talk and behaviors: challenge core beliefs,  myths, and actions.  Status: New - Date: 7/8/2020     Intervention(s)  Therapist will assign homework to engage in cognitive restructuring  provide support to critically look at how negative beliefs keep you from moving forward .    Objective #B  Patient will identify core beliefs and the times in your life that originally created beliefs.  Status: New - Date: 7/8/2020     Intervention(s)  Therapist will assign homework to challenge core beliefs  provide educational materials on core beliefs and lifetime patterns  teach how to challenge core beliefs.    Objective #C  Patient will will be working towards his goals .  Status: New - Date: 7/8/2020     Intervention(s)  Therapist will assign homework to continue to use skills from therapy  provide support to negotiate what works and what patient continues to struggle with.      Goal 2: Patient will not be so negative     I will know I've met my goal when My  would tell me I am nicer, I would be able to define what it means to be a more positive person.      Objective #A (Patient Action)    Status: New - Date: 7/8/2020     Patient will identify feelings and emotions that occur prior to more negative behaviors.    Intervention(s)  Therapist will assign homework to track patient thoughts and feelings  provide educational materials on cognitive distortions  teach cognitive restructuring.    Objective #B  Patient will identify atleast 3 other responses that I can have other than negative.    Status: New - Date: 7/8/2020     Intervention(s)  Therapist will assign homework to practice other types of responses  provide educational materials on effective ways to communicate with others  role-play more effective ways to communicate with others.    Objective #C  Patient will learn and demonstrate 3 assertiveness skill(s).  Status: New - Date: 7/8/2020     Intervention(s)  Therapist will assign homework to use assertive communication  role-play assertiveness skills.      Goal  3: Patient will find a hobby or interest    I will know I've met my goal when I go back to school and feel like I have things that are meaningful.      Objective #A (Patient Action)    Status: New - Date: 7/8/2020     Patient will Identify what he would like to do and steps that would get him there.    Intervention(s)  Therapist will assign homework to decide on steps to get towards goals  teach the client how to complete a 4-part pros and cons. to help with decision making.    Objective #B  Patient will identify barriers that keep him from moving forward with his goals.    Status: New - Date: 7/8/2020     Intervention(s)  Therapist will assign homework to note barriers   teach problem-sovling model.    Objective #C  Patient will engage in problem solving to help him move forward with his interst.  Status: New - Date: 7/8/2020     Intervention(s)  Therapist will assign homework to carry out possible solutions  provide support to gauge how problem-solving is going and other ways to look at issues.      Patient has reviewed and agreed to the above plan.      Rossy Barnes, [facility]  July 8, 2020

## 2020-08-19 NOTE — TELEPHONE ENCOUNTER
TO PCP:     See below - pt asking about a referral to PT for arm pain/discussed at 8/6/2020 visit     Shiloh JADE RN;

## 2020-08-31 DIAGNOSIS — J45.20 MILD INTERMITTENT ASTHMA WITHOUT COMPLICATION: ICD-10-CM

## 2020-08-31 DIAGNOSIS — J30.9 ALLERGIC RHINITIS, UNSPECIFIED SEASONALITY, UNSPECIFIED TRIGGER: ICD-10-CM

## 2020-08-31 ASSESSMENT — ENCOUNTER SYMPTOMS
NECK PAIN: 0
WEAKNESS: 0
NUMBNESS: 0

## 2020-09-01 RX ORDER — MONTELUKAST SODIUM 10 MG/1
10 TABLET ORAL AT BEDTIME
Qty: 90 TABLET | Refills: 0 | Status: SHIPPED | OUTPATIENT
Start: 2020-09-01 | End: 2020-12-09

## 2020-09-02 ENCOUNTER — VIRTUAL VISIT (OUTPATIENT)
Dept: BEHAVIORAL HEALTH | Facility: CLINIC | Age: 50
End: 2020-09-02
Payer: COMMERCIAL

## 2020-09-02 ENCOUNTER — THERAPY VISIT (OUTPATIENT)
Dept: PHYSICAL THERAPY | Facility: CLINIC | Age: 50
End: 2020-09-02
Attending: INTERNAL MEDICINE
Payer: COMMERCIAL

## 2020-09-02 DIAGNOSIS — F10.20 ALCOHOL USE DISORDER, MODERATE, DEPENDENCE (H): Primary | ICD-10-CM

## 2020-09-02 DIAGNOSIS — M75.52 SUBACROMIAL BURSITIS OF BOTH SHOULDERS: ICD-10-CM

## 2020-09-02 DIAGNOSIS — M25.512 BILATERAL SHOULDER PAIN: ICD-10-CM

## 2020-09-02 DIAGNOSIS — M25.511 BILATERAL SHOULDER PAIN: ICD-10-CM

## 2020-09-02 DIAGNOSIS — M79.10 MYALGIA: ICD-10-CM

## 2020-09-02 DIAGNOSIS — M75.51 SUBACROMIAL BURSITIS OF BOTH SHOULDERS: ICD-10-CM

## 2020-09-02 PROCEDURE — 97161 PT EVAL LOW COMPLEX 20 MIN: CPT | Mod: GP | Performed by: PHYSICAL THERAPIST

## 2020-09-02 PROCEDURE — 90834 PSYTX W PT 45 MINUTES: CPT | Mod: GT | Performed by: SOCIAL WORKER

## 2020-09-02 PROCEDURE — 97530 THERAPEUTIC ACTIVITIES: CPT | Mod: GP | Performed by: PHYSICAL THERAPIST

## 2020-09-02 PROCEDURE — 97110 THERAPEUTIC EXERCISES: CPT | Mod: GP | Performed by: PHYSICAL THERAPIST

## 2020-09-02 NOTE — PROGRESS NOTES
"[facility] Initial Evaluation  Subjective:  The history is provided by the patient. No  was used.   Therapist Generated HPI Evaluation  Problem details: Patient started a cholesterol medication in June 2020, shortly after he developed soreness bilateral shoulders so stopped medication.  Pain did not resolve.  He was given a steroid dosepack early August which abolished most of pain but still has very restricted ROM in the shoulders.  Pain is currently constant \"ache\" left upper arm/not shoulder with intermittent \"sharp\" pain ranging 1-10/10 and describes feeling of \"heaviness\" left upper arm with lifting the arm and intermittent \"twitching\" left upper arm.  Right shoulder pain is intermittent 0-8/10, describes as \"sharp\".  Left shoulder pain increases/has difficulty with lifting the arm overhead to the front, side and behind, (difficulty dressing and getting belt on), unable to use left arm to hook seatbelt and pass credit card out of -side window.  No pain/issue with lying on the left side, but pain left arm with lying on the right side. Symptoms left arm decrease with avoiding activity.  Pain was signficantly reduced with steroid dose pack.    Right shoulder pain increases with reaching behind and needing to carefully position right arm when lying on the left.  Symptoms decrease with avoiding movement.      Patient was instructed in exercises by MD in August - still doing every other day:  standing wand shoulder flexion, supine ER, left shoulder isometric IR and ER, sidelying post capsule stretch left, scaption.      Patient has chronic restriction right elbow flexion to about 100 degrees since distal humerus fracture/ORIF ~1995.  Patient is right-handed. .                                    Patient Health History           General health as reported by patient is excellent.  Pertinent medical history includes: asthma (high cholesterol).   Red flags:  None as reported " "by patient.   Other medical allergies details: penicillin.   Surgeries include:  Orthopedic surgery. Other surgery history details: ORIF distal right humerus ~1995.    Current medications: singulair, OTC Aleve.       Primary job tasks include:  Driving.                                    Objective:  Standing Alignment:    Cervical/Thoracic:  Forward head, cervical lordosis decreased, thoracic kyphosis increased and Dowager's hump  Shoulder/UE:  Rounded shoulders                                  Cervical/Thoracic Evaluation    AROM:  AROM Cervical:    Flexion:          WNL  Extension:       75%  Rotation:         Left: 75% - pain left cervical     Right: WNL- ERP right cervical   Side Bend:      Left:     Right:                                Shoulder Evaluation:  ROM:  AROM:    Flexion:  Left:  80-ERP    Right:  128 - slight ERP  Extension: Left: 33%Right: 75%  Abduction:  Left: 38   Right:  126     Internal Rotation:  Left:  75% at 45 degrees    Right:  WNL  External Rotation:  Left:  0 at 45 degrees abdcution    Right:  44  Horizontal Abduction:  Left:  Fingertips to upper trapezius    Right:  WNL    Elbow Flexion:  Left:  WNL    Right:  111  Elbow Extension:  Left:  0   Right:  0    Extension/Internal Rotation:  Left:  Lateral buttock    Right:  T12     PROM:    Flexion:  Right: 160      Abduction:  Right:  159    Internal Rotation:  Left:  75% at 45 degrees      External Rotation:  Left:  0 at 45 abduction    Right:  51                                                           MMT:  Left shoulder flexion 4/5, abduction 2-/5, IR and ER 5/5 without pain   MMT:  Right shoulder flexion, abduction, extension 4+/5, ER 4+/5, IR 5/5     Shoulder impingement test:  Negative bilaterally  General     ROS  Pretest symptoms sitting:  Pain left upper arm, \"heavy\" felling  Correction of sitting posture with lumbar roll: no effect  Seated cervical retraction with patient overpressure: no change pain, increase left shoulder " "abduction to 83 degrees, increase right shoulder abduction and flexion >15 degrees each     Assessment/Plan:    Patient is a 50 year old male with bilateral arm complaints.  Pain and loss of function was insidious and has additional complaints of \"heaviness\" and \"twitching\" not typical for shoulder issues.  Suspect cervical involvement with potentially adhesive capsulitis that has developed in the left shoulder.  Patient was started with trial of cervical retraction sitting and correction of sitting posture and will reassess in 1 week.  Patient has the following significant findings with corresponding treatment plan.                Diagnosis 1:  B shoulder pain    Pain -  self management, education, directional preference exercise and home program  Decreased ROM/flexibility - therapeutic exercise and home program  Decreased strength - therapeutic exercise, therapeutic activities and home program  Inflammation - self management/home program  Decreased function - therapeutic activities and home program  Impaired posture - neuro re-education and home program      Cumulative Therapy Evaluation is: Low complexity.    Previous and current functional limitations:  (See Goal Flow Sheet for this information)    Short term and Long term goals: (See Goal Flow Sheet for this information)     Communication ability:  Patient appears to be able to clearly communicate and understand verbal and written communication and follow directions correctly.  Treatment Explanation - The following has been discussed with the patient:   RX ordered/plan of care  Anticipated outcomes  Possible risks and side effects  This patient would benefit from PT intervention to resume normal activities.   Rehab potential is good.    Frequency:  1 X week, once daily  Duration:  for 8 weeks  Discharge Plan:  Achieve all LTG.  Independent in home treatment program.  Reach maximal therapeutic benefit.    Please refer to the daily flowsheet for treatment today, " total treatment time and time spent performing 1:1 timed codes.

## 2020-09-02 NOTE — PROGRESS NOTES
Progress Note    Patient Name: Nick Montiel  Date: 9/2/2020         Service Type: Individual      Session Start Time: 10:32  Session End Time: 11:24     Session Length: 52    Session #: 8    Attendees: Client attended alone    Service Modality:  Video Visit:    Telemedicine Visit: The patient's condition can be safely assessed and treated via synchronous audio and visual telemedicine encounter.      Reason for Telemedicine Visit: Services only offered telehealth    Originating Site (Patient Location): Patient's home    Distant Site (Provider Location): Provider Remote Setting    Consent:  The patient/guardian has verbally consented to: the potential risks and benefits of telemedicine (video visit) versus in person care; bill my insurance or make self-payment for services provided; and responsibility for payment of non-covered services.     Patient would like the video invitation sent by: Send to e-mail at: [email or web address]     Mode of Communication:  Video Conference via Amwell    As the provider I attest to compliance with applicable laws and regulations related to telemedicine.     Treatment Plan Last Reviewed: 7/8/2020  PHQ-9 / DAPHNE-7 : 4/4 - 6/24/2020    DATA  Interactive Complexity: No  Crisis: No       Progress Since Last Session (Related to Symptoms / Goals / Homework):   Symptoms: Worsening Reports that life has been more stressful, that drinking continues the same and that he struggles to cut down, patient reports that he and  continue to struggle sexually    Homework: Achieved / completed to satisfaction      Episode of Care Goals: Achieved / completed to satisfaction - PREPARATION (Decided to change - considering how); Intervened by negotiating a change plan and determining options / strategies for behavior change, identifying triggers, exploring social supports, and working towards setting a date to begin behavior change     Current /  Ongoing Stressors and Concerns:   Patient reports that is housing is stressful, living in one bedroom with  and sister-in-law.  He notes that moving into potential new house is on hold due to family dynamics.      Treatment Objective(s) Addressed in This Session:   identify patterns of escalation  (i.e. tightness in chest, flushed face, increased heart rate, clenched hands, etc.)  Identified 2-3 triggers for anger       Intervention:   CBT: Patient reviewed times that he is angry and who he becomes angry at.  Patient identified  as source of much anger and expectations of other people's behavior.         ASSESSMENT: Current Emotional / Mental Status (status of significant symptoms):   Risk status (Self / Other harm or suicidal ideation)   Patient denies current fears or concerns for personal safety.   Patient denies current or recent suicidal ideation or behaviors.   Patient denies current or recent homicidal ideation or behaviors.   Patient denies current or recent self injurious behavior or ideation.   Patient denies other safety concerns.   Patient reports there has been no change in risk factors since their last session.     Patient reports there has been no change in protective factors since their last session.     Recommended that patient call 911 or go to the local ED should there be a change in any of these risk factors.     Appearance:   Appropriate    Eye Contact:   Fair    Psychomotor Behavior: Normal    Attitude:   Cooperative  Interested   Orientation:   All   Speech    Rate / Production: Normal/ Responsive    Volume:  Normal    Mood:    Stressed   Affect:    Appropriate    Thought Content:  Clear    Thought Form:  Coherent  Logical  Circumstantial   Insight:    Fair      Medication Review:   No current psychiatric medications prescribed     Medication Compliance:   NA     Changes in Health Issues:   None reported     Chemical Use Review:   Substance Use: Problem use continues with no  change since last session, Stage of Change: Preparatory        Tobacco Use: No current tobacco use.      Diagnosis:  1. Alcohol use disorder, moderate, dependence (H)        Collateral Reports Completed:   Not Applicable    PLAN: (Patient Tasks / Therapist Tasks / Other)  Patient will write down feelings that he is experiencing when angry        Rossy JamisonFadiAdama, LICSW                                                         ______________________________________________________________________    Treatment Plan    Patient's Name: Nick Montiel  YOB: 1970    Date: 7/8/2020    DSM5 Diagnoses: Substance-Related & Addictive Disorders Alcohol Use Disorder   303.90 (F10.20) Moderate continued use  Psychosocial / Contextual Factors:  Individual Factors has had problems with alcohol use his adult lifetime, has stolen items and engaged in high risk sexual behaivors more historically then recently, Family Factors Reports a negative relationship with mom and does not speak with dad.  His current partner is concerend about his alcohol use and his mood swings that cause anger, Community Factors he has experienced homelessness and has been engaged in the [employer] community and has performed drag and Economic Factors currently works as a  and is economically comfortable and has a history of difficulty maintaining employement.  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      Objective #A (Patient Action)    Patient will Identify negative self-talk and behaviors: challenge core  beliefs, myths, and actions.  Status: New - Date: 7/8/2020     Intervention(s)  Therapist will assign homework to engage in cognitive restructuring  provide support to critically look at how negative beliefs keep you from moving forward .    Objective #B  Patient will identify core beliefs and the times in your life that originally created beliefs.  Status: New - Date: 7/8/2020     Intervention(s)  Therapist will assign homework to challenge core beliefs  provide educational materials on core beliefs and lifetime patterns  teach how to challenge core beliefs.    Objective #C  Patient will will be working towards his goals .  Status: New - Date: 7/8/2020     Intervention(s)  Therapist will assign homework to continue to use skills from therapy  provide support to negotiate what works and what patient continues to struggle with.      Goal 2: Patient will not be so negative     I will know I've met my goal when My  would tell me I am nicer, I would be able to define what it means to be a more positive person.      Objective #A (Patient Action)    Status: New - Date: 7/8/2020     Patient will identify feelings and emotions that occur prior to more negative behaviors.    Intervention(s)  Therapist will assign homework to track patient thoughts and feelings  provide educational materials on cognitive distortions  teach cognitive restructuring.    Objective #B  Patient will identify atleast 3 other responses that I can have other than negative.    Status: New - Date: 7/8/2020     Intervention(s)  Therapist will assign homework to practice other types of responses  provide educational materials on effective ways to communicate with others  role-play more effective ways to communicate with others.    Objective #C  Patient will learn and demonstrate 3 assertiveness skill(s).  Status: New - Date: 7/8/2020     Intervention(s)  Therapist will assign homework to use assertive communication  role-play assertiveness  skills.      Goal 3: Patient will find a hobby or interest    I will know I've met my goal when I go back to school and feel like I have things that are meaningful.      Objective #A (Patient Action)    Status: New - Date: 7/8/2020     Patient will Identify what he would like to do and steps that would get him there.    Intervention(s)  Therapist will assign homework to decide on steps to get towards goals  teach the client how to complete a 4-part pros and cons. to help with decision making.    Objective #B  Patient will identify barriers that keep him from moving forward with his goals.    Status: New - Date: 7/8/2020     Intervention(s)  Therapist will assign homework to note barriers   teach problem-sovling model.    Objective #C  Patient will engage in problem solving to help him move forward with his interst.  Status: New - Date: 7/8/2020     Intervention(s)  Therapist will assign homework to carry out possible solutions  provide support to gauge how problem-solving is going and other ways to look at issues.      Patient has reviewed and agreed to the above plan.      Rossy Barnes, LETICIA  July 8, 2020

## 2020-09-08 ENCOUNTER — THERAPY VISIT (OUTPATIENT)
Dept: PHYSICAL THERAPY | Facility: CLINIC | Age: 50
End: 2020-09-08
Payer: COMMERCIAL

## 2020-09-08 ENCOUNTER — VIRTUAL VISIT (OUTPATIENT)
Dept: BEHAVIORAL HEALTH | Facility: CLINIC | Age: 50
End: 2020-09-08
Payer: COMMERCIAL

## 2020-09-08 DIAGNOSIS — M25.512 BILATERAL SHOULDER PAIN: ICD-10-CM

## 2020-09-08 DIAGNOSIS — M25.511 BILATERAL SHOULDER PAIN: ICD-10-CM

## 2020-09-08 DIAGNOSIS — F10.20 ALCOHOL USE DISORDER, MODERATE, DEPENDENCE (H): Primary | ICD-10-CM

## 2020-09-08 PROCEDURE — 90834 PSYTX W PT 45 MINUTES: CPT | Mod: GT | Performed by: SOCIAL WORKER

## 2020-09-08 PROCEDURE — 97530 THERAPEUTIC ACTIVITIES: CPT | Mod: GP | Performed by: PHYSICAL THERAPIST

## 2020-09-08 PROCEDURE — 97110 THERAPEUTIC EXERCISES: CPT | Mod: GP | Performed by: PHYSICAL THERAPIST

## 2020-09-08 NOTE — PROGRESS NOTES
Progress Note    Patient Name: Nick oMntiel  Date: 9/8/2020         Service Type: Individual      Session Start Time: 16:33  Session End Time: 17:19     Session Length: 46    Session #: 9    Attendees: Client attended alone    Service Modality:  Video Visit:    Telemedicine Visit: The patient's condition can be safely assessed and treated via synchronous audio and visual telemedicine encounter.      Reason for Telemedicine Visit: Services only offered telehealth    Originating Site (Patient Location): Patient's home    Distant Site (Provider Location): Provider Remote Setting    Consent:  The patient/guardian has verbally consented to: the potential risks and benefits of telemedicine (video visit) versus in person care; bill my insurance or make self-payment for services provided; and responsibility for payment of non-covered services.     Patient would like the video invitation sent by: Send to e-mail at: [email or web address]     Mode of Communication:  Video Conference via [employer]    As the provider I attest to compliance with applicable laws and regulations related to telemedicine.     Treatment Plan Last Reviewed: 7/8/2020  PHQ-9 / DAPHNE-7 : 4/4 - 6/24/2020    DATA  Interactive Complexity: No  Crisis: No       Progress Since Last Session (Related to Symptoms / Goals / Homework):   Symptoms: No change Patient is more reflective about his anger.    Homework: Achieved / completed to satisfaction      Episode of Care Goals: Achieved / completed to satisfaction - PREPARATION (Decided to change - considering how); Intervened by negotiating a change plan and determining options / strategies for behavior change, identifying triggers, exploring social supports, and working towards setting a date to begin behavior change     Current / Ongoing Stressors and Concerns:   Patient reports that is housing is stressful, living in one bedroom with  and sister-in-law.   He notes that moving into potential new house is on hold due to family dynamics.       Treatment Objective(s) Addressed in This Session:   Identify 2-3 emotions related to anger       Intervention:   CBT: Patient discussed what emotions came up when he was angry throughout the week.  He noted that he feels frustrated, irritated and that he feels the need to control.        ASSESSMENT: Current Emotional / Mental Status (status of significant symptoms):   Risk status (Self / Other harm or suicidal ideation)   Patient denies current fears or concerns for personal safety.   Patient denies current or recent suicidal ideation or behaviors.   Patient denies current or recent homicidal ideation or behaviors.   Patient denies current or recent self injurious behavior or ideation.   Patient denies other safety concerns.   Patient reports there has been no change in risk factors since their last session.     Patient reports there has been no change in protective factors since their last session.     Recommended that patient call 911 or go to the local ED should there be a change in any of these risk factors.     Appearance:   Appropriate    Eye Contact:   Good    Psychomotor Behavior: Normal    Attitude:   Cooperative  Interested   Orientation:   All   Speech    Rate / Production: Normal/ Responsive    Volume:  Normal    Mood:    Normal Stressed   Affect:    Appropriate    Thought Content:  Clear    Thought Form:  Coherent  Logical  Circumstantial   Insight:    Fair      Medication Review:   No current psychiatric medications prescribed     Medication Compliance:   NA     Changes in Health Issues:   None reported     Chemical Use Review:   Substance Use: Problem use continues with no change since last session, Stage of Change: Preparatory        Tobacco Use: No current tobacco use.      Diagnosis:  1. Alcohol use disorder, moderate, dependence (H)        Collateral Reports Completed:   Not Applicable    PLAN: (Patient Tasks /  Therapist Tasks / Other)  Patient will write down thoughts that he notes he is experiencing when angry.        Rossy Barnes, LICSW                                                         ______________________________________________________________________    Treatment Plan    Patient's Name: Nick Montiel  YOB: 1970    Date: 7/8/2020    DSM5 Diagnoses: Substance-Related & Addictive Disorders Alcohol Use Disorder   303.90 (F10.20) Moderate continued use  Psychosocial / Contextual Factors:  Individual Factors has had problems with alcohol use his adult lifetime, has stolen items and engaged in high risk sexual behaivors more historically then recently, Family Factors Reports a negative relationship with mom and does not speak with dad.  His current partner is concerend about his alcohol use and his mood swings that cause anger, Community Factors he has experienced homelessness and has been engaged in the [employer] [employer] community and has performed drag and Economic Factors currently works as a  and is economically comfortable and has a history of difficulty maintaining employement.  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      Objective #A (Patient Action)    Patient will Identify negative self-talk and behaviors: challenge core beliefs, myths, and actions.  Status: New - Date: 7/8/2020     Intervention(s)  Therapist will assign homework to engage in cognitive restructuring  provide support to critically look at how negative beliefs keep you from moving forward  .    Objective #B  Patient will identify core beliefs and the times in your life that originally created beliefs.  Status: New - Date: 7/8/2020     Intervention(s)  Therapist will assign homework to challenge core beliefs  provide educational materials on core beliefs and lifetime patterns  teach how to challenge core beliefs.    Objective #C  Patient will will be working towards his goals .  Status: New - Date: 7/8/2020     Intervention(s)  Therapist will assign homework to continue to use skills from therapy  provide support to negotiate what works and what patient continues to struggle with.      Goal 2: Patient will not be so negative     I will know I've met my goal when My  would tell me I am nicer, I would be able to define what it means to be a more positive person.      Objective #A (Patient Action)    Status: New - Date: 7/8/2020     Patient will identify feelings and emotions that occur prior to more negative behaviors.    Intervention(s)  Therapist will assign homework to track patient thoughts and feelings  provide educational materials on cognitive distortions  teach cognitive restructuring.    Objective #B  Patient will identify atleast 3 other responses that I can have other than negative.    Status: New - Date: 7/8/2020     Intervention(s)  Therapist will assign homework to practice other types of responses  provide educational materials on effective ways to communicate with others  role-play more effective ways to communicate with others.    Objective #C  Patient will learn and demonstrate 3 assertiveness skill(s).  Status: New - Date: 7/8/2020     Intervention(s)  Therapist will assign homework to use assertive communication  role-play assertiveness skills.      Goal 3: Patient will find a hobby or interest    I will know I've met my goal when I go back to school and feel like I have things that are meaningful.      Objective #A (Patient Action)    Status: New - Date: 7/8/2020     Patient  will Identify what he would like to do and steps that would get him there.    Intervention(s)  Therapist will assign homework to decide on steps to get towards goals  teach the client how to complete a 4-part pros and cons. to help with decision making.    Objective #B  Patient will identify barriers that keep him from moving forward with his goals.    Status: New - Date: 7/8/2020     Intervention(s)  Therapist will assign homework to note barriers   teach problem-sovling model.    Objective #C  Patient will engage in problem solving to help him move forward with his interst.  Status: New - Date: 7/8/2020     Intervention(s)  Therapist will assign homework to carry out possible solutions  provide support to gauge how problem-solving is going and other ways to look at issues.      Patient has reviewed and agreed to the above plan.      LETICIA Naqvi  July 8, 2020

## 2020-09-15 ENCOUNTER — THERAPY VISIT (OUTPATIENT)
Dept: PHYSICAL THERAPY | Facility: CLINIC | Age: 50
End: 2020-09-15
Payer: COMMERCIAL

## 2020-09-15 DIAGNOSIS — M25.511 BILATERAL SHOULDER PAIN: ICD-10-CM

## 2020-09-15 DIAGNOSIS — M25.512 BILATERAL SHOULDER PAIN: ICD-10-CM

## 2020-09-15 PROCEDURE — 97110 THERAPEUTIC EXERCISES: CPT | Mod: GP | Performed by: PHYSICAL THERAPIST

## 2020-09-15 PROCEDURE — 97140 MANUAL THERAPY 1/> REGIONS: CPT | Mod: GP | Performed by: PHYSICAL THERAPIST

## 2020-09-16 ENCOUNTER — VIRTUAL VISIT (OUTPATIENT)
Dept: BEHAVIORAL HEALTH | Facility: CLINIC | Age: 50
End: 2020-09-16
Payer: COMMERCIAL

## 2020-09-16 DIAGNOSIS — F10.10 ALCOHOL USE DISORDER, MILD, ABUSE: Primary | ICD-10-CM

## 2020-09-16 PROCEDURE — 90834 PSYTX W PT 45 MINUTES: CPT | Mod: GT | Performed by: SOCIAL WORKER

## 2020-09-16 NOTE — PROGRESS NOTES
Progress Note    Patient Name: Nick Montiel  Date: 9/16/2020         Service Type: Individual      Session Start Time: 9:35  Session End Time: 10:25     Session Length: 50    Session #: 10    Attendees: Client attended alone    Service Modality:  Video Visit:    Telemedicine Visit: The patient's condition can be safely assessed and treated via synchronous audio and visual telemedicine encounter.      Reason for Telemedicine Visit: Services only offered telehealth    Originating Site (Patient Location): Patient's home    Distant Site (Provider Location): Provider Remote Setting    Consent:  The patient/guardian has verbally consented to: the potential risks and benefits of telemedicine (video visit) versus in person care; bill my insurance or make self-payment for services provided; and responsibility for payment of non-covered services.     Patient would like the video invitation sent by: Send to e-mail at: [email or web address]}     Mode of Communication:  Video Conference via [employer]    As the provider I attest to compliance with applicable laws and regulations related to telemedicine.     Treatment Plan Last Reviewed: 7/8/2020  PHQ-9 / DAPHNE-7 : 4/4 - 6/24/2020    DATA  Interactive Complexity: No  Crisis: No       Progress Since Last Session (Related to Symptoms / Goals / Homework):   Symptoms: Improving Reports less anger and irritability over the last week.    Homework: Achieved / completed to satisfaction      Episode of Care Goals: Satisfactory progress - ACTION (Actively working towards change); Intervened by reinforcing change plan / affirming steps taken     Current / Ongoing Stressors and Concerns:   Patient reports that things are currently stressful with changing jobs and that he will be moving.     Treatment Objective(s) Addressed in This Session:   identify 2-3 strategies to more effectively address stressors  learn and demonstrate 1 assertiveness  skill(s)       Intervention:   Solution Focused Therapy: Patient reflected with writer how talking to his  about his irritability has made it so that his  is not starting out the day engaging in activities causing him irritability and this may be helping him to feel less annoyed throughout the day.  Reflected with patient how his job change will help him with moving forward on his career goals and having contact with people outside of his .  Patient reported that he has found a new place to live and was able to reflect on how this will help relieve stress in current living situation.        ASSESSMENT: Current Emotional / Mental Status (status of significant symptoms):   Risk status (Self / Other harm or suicidal ideation)   Patient denies current fears or concerns for personal safety.   Patient denies current or recent suicidal ideation or behaviors.   Patient denies current or recent homicidal ideation or behaviors.   Patient denies current or recent self injurious behavior or ideation.   Patient denies other safety concerns.   Patient reports there has been no change in risk factors since their last session.     Patient reports there has been no change in protective factors since their last session.     Recommended that patient call 911 or go to the local ED should there be a change in any of these risk factors.     Appearance:   Appropriate    Eye Contact:   Good    Psychomotor Behavior: Normal  Agitated - due to neck pain   Attitude:   Cooperative  Interested   Orientation:   All   Speech    Rate / Production: Normal/ Responsive    Volume:  Normal    Mood:    Stressed   Affect:    Appropriate    Thought Content:  Clear    Thought Form:  Coherent  Logical  Circumstantial   Insight:    Fair      Medication Review:   No current psychiatric medications prescribed     Medication Compliance:   NA     Changes in Health Issues:   None reported     Chemical Use Review:   Substance Use: decrease in  alcohol .  Patient reports frequency of use patient did not drink for 4 days.  Stage of Change: Contemplation and reflected with patient what he noticed by not drinking for 4 days.        Tobacco Use: No current tobacco use.      Diagnosis:  1. Alcohol use disorder, mild, abuse        Collateral Reports Completed:   Not Applicable    PLAN: (Patient Tasks / Therapist Tasks / Other)  Patient will continue to write down thoughts that he notes he is experiencing when angry.        Rossy Barnes, LICSW                                                         ______________________________________________________________________    Treatment Plan    Patient's Name: Nick Montiel  YOB: 1970    Date: 7/8/2020    DSM5 Diagnoses: Substance-Related & Addictive Disorders Alcohol Use Disorder   303.90 (F10.20) Moderate continued use  Psychosocial / Contextual Factors:  Individual Factors has had problems with alcohol use his adult lifetime, has stolen items and engaged in high risk sexual behaivors more historically then recently, Family Factors Reports a negative relationship with mom and does not speak with dad.  His current partner is concerend about his alcohol use and his mood swings that cause anger, Community Factors he has experienced homelessness and has been engaged in the [place] community and has performed drag and Economic Factors currently works as a  and is economically comfortable and has a history of difficulty maintaining employement.  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      Objective #A (Patient Action)    Patient will Identify negative self-talk and behaviors: challenge core beliefs, myths, and actions.  Status: New - Date: 7/8/2020     Intervention(s)  Therapist will assign homework to engage in cognitive restructuring  provide support to critically look at how negative beliefs keep you from moving forward .    Objective #B  Patient will identify core beliefs and the times in your life that originally created beliefs.  Status: New - Date: 7/8/2020     Intervention(s)  Therapist will assign homework to challenge core beliefs  provide educational materials on core beliefs and lifetime patterns  teach how to challenge core beliefs.    Objective #C  Patient will will be working towards his goals .  Status: New - Date: 7/8/2020     Intervention(s)  Therapist will assign homework to continue to use skills from therapy  provide support to negotiate what works and what patient continues to struggle with.      Goal 2: Patient will not be so negative     I will know I've met my goal when My  would tell me I am nicer, I would be able to define what it means to be a more positive person.      Objective #A (Patient Action)    Status: New - Date: 7/8/2020     Patient will identify feelings and emotions that occur prior to more negative behaviors.    Intervention(s)  Therapist will assign homework to track patient thoughts and feelings  provide educational materials on cognitive distortions  teach cognitive restructuring.    Objective #B  Patient will identify atleast 3 other responses that I can have other than negative.    Status: New - Date: 7/8/2020     Intervention(s)  Therapist will assign homework to practice other types of responses  provide educational materials on effective ways to communicate with others  role-play more effective ways to communicate with others.    Objective #C  Patient will learn and demonstrate 3 assertiveness skill(s).  Status:  New - Date: 7/8/2020     Intervention(s)  Therapist will assign homework to use assertive communication  role-play assertiveness skills.      Goal 3: Patient will find a hobby or interest    I will know I've met my goal when I go back to school and feel like I have things that are meaningful.      Objective #A (Patient Action)    Status: New - Date: 7/8/2020     Patient will Identify what he would like to do and steps that would get him there.    Intervention(s)  Therapist will assign homework to decide on steps to get towards goals  teach the client how to complete a 4-part pros and cons. to help with decision making.    Objective #B  Patient will identify barriers that keep him from moving forward with his goals.    Status: New - Date: 7/8/2020     Intervention(s)  Therapist will assign homework to note barriers   teach problem-sovling model.    Objective #C  Patient will engage in problem solving to help him move forward with his interst.  Status: New - Date: 7/8/2020     Intervention(s)  Therapist will assign homework to carry out possible solutions  provide support to gauge how problem-solving is going and other ways to look at issues.      Patient has reviewed and agreed to the above plan.      Rossy Barnes, LETICIA  July 8, 2020

## 2020-09-22 ENCOUNTER — VIRTUAL VISIT (OUTPATIENT)
Dept: FAMILY MEDICINE | Facility: OTHER | Age: 50
End: 2020-09-22

## 2020-09-22 DIAGNOSIS — Z20.822 SUSPECTED 2019 NOVEL CORONAVIRUS INFECTION: Primary | ICD-10-CM

## 2020-09-22 NOTE — PROGRESS NOTES
"Date: 2020 10:53:29  Clinician: Delgado Page  Clinician NPI: [id number]  Patient: Nick Merlos  Patient : 1970  Patient Address: [address] [place] [address] [place] [address]  Patient Phone: [phone]  Visit Protocol: URI  Patient Summary:  Nick is a 50 year old ( : 1970 ) male who initiated a [facility] Visit for COVID-19 (Coronavirus) evaluation and screening. When asked the question \"Please sign me up to receive news, health information and promotions. \", Nick responded \"Yes\".    When asked when his symptoms started, Nick reported that he does not have any symptoms.   He denies taking antibiotic medication in the past month and having recent facial or sinus surgery in the past 60 days.    Pertinent COVID-19 (Coronavirus) information  In the past 14 days, Nick has not worked in a congregate living setting.   He does not work or volunteer as healthcare worker or a  and does not work or volunteer in a healthcare facility.   Nick also has not lived in a congregate living setting in the past 14 days. He does not live with a healthcare worker.   Nick has had a close contact with a laboratory-confirmed COVID-19 patient in the last 14 days. Additional information about contact with COVID-19 (Coronavirus) patient as reported by the patient (free text): Customer tested Thursday, looked at a house Saturday, got positive result back    Patient reported they are not living in the same household with a COVID-19 positive patient.  Patient was in an enclosed space for greater than 15 minutes with a COVID-19 patient.  Since 2019, Nick and has not had upper respiratory infection or influenza-like illness. Has not been diagnosed with lab-confirmed COVID-19 test   Pertinent medical history  Nick needs a return to work/school note.   Weight: 170 lbs   Nick does not smoke or use smokeless tobacco.   Weight: 170 lbs    MEDICATIONS: montelukast oral, albuterol sulfate inhalation, " Dr. Rajendra Villaseñor @ bedside assessing pt. POC reviewed. ALLERGIES: Penicillins  Clinician Response:  Dear Nick,   Based on your exposure to COVID-19 (coronavirus), we would like to test you for this virus.  1. Please call [phone] to schedule your visit. Explain that you were referred by [facility] to have a COVID-19 test. Be ready to [facility].  The following will serve as your written order for this COVID Test, ordered by me, for the indication of suspected COVID [Z20.828]: The test will be ordered in [facility], our electronic health record, after you are scheduled. It will show as ordered and authorized by Maikel Lenz MD.  Order: COVID-19 (coronavirus) PCR for ASYMPTOMATIC EXPOSURE testing from [facility].  If you know you have had close contact with someone who tested positive, you should be quarantined for 14 days after this exposure. You should stay in quarantine for the14 days even if the covid test is negative, the optimal time to test after exposure is 5-7 days from the exposure  Quarantine means   What should I do?  For safety, it's very important to follow these rules. Do this for 14 days after the date you were last exposed to the virus..  Stay home and away from others. Don't go to school or anywhere else. Generally quarantine means staying home from work but there are some exceptions to this. Please contact your workplace.   No hugging, kissing or shaking hands.  Don't let anyone visit.  Cover your mouth and nose with a mask, tissue or washcloth to avoid spreading germs.  Wash your hands and face often. Use soap and water.  What are the symptoms of COVID-19?  The most common symptoms are cough, fever and trouble breathing. Less common symptoms include headache, body aches, fatigue (feeling very tired), chills, sore throat, stuffy or runny nose, diarrhea (loose poop), loss of taste or smell, belly pain, and nausea or vomiting (feeling sick to your stomach or throwing up).  After 14 days, if you have still don't have symptoms, you likely don't  have this virus.  If you develop symptoms, follow these guidelines.  If you're normally healthy: Please start another [facility] visit to report your symptoms. Go to [email or web address].  If you have a serious health problem (like cancer, heart failure, an organ transplant or kidney disease): Call your specialty clinic. Let them know that you might have COVID-19.  2. When it's time for your COVID test:  Stay at least 6 feet away from others. (If someone will drive you to your test, stay in the backseat, as far away from the  as you can.)  Cover your mouth and nose with a mask, tissue or washcloth.  Go straight to the testing site. Don't make any stops on the way there or back.  Please note  Caregivers in these groups are at risk for severe illness due to COVID-19:  o People 65 years and older  o People who live in a nursing home or long-term care facility  o People with chronic disease (lung, heart, cancer, diabetes, kidney, liver, immunologic)  o People who have a weakened immune system, including those who:  Are in cancer treatment  Take medicine that weakens the immune system, such as corticosteroids  Had a bone marrow or organ transplant  Have an immune deficiency  Have poorly controlled HIV or AIDS  Are obese (body mass index of 40 or higher)  Smoke regularly  Where can I get more information?  [facility] -- About COVID-19: [email or web address]  CDC -- What to Do If You're Sick: www.cdc.gov/coronavirus/2019-ncov/about/steps-when-sick.html  CDC -- Ending Home Isolation: www.cdc.gov/coronavirus/2019-ncov/hcp/disposition-in-home-patients.html  CDC -- Caring for Someone: www.cdc.gov/coronavirus/2019-ncov/if-you-are-sick/care-for-someone.html  [facility] -- Interim Guidance for Hospital Discharge to Home: www.health.[facility].[place].us/diseases/coronavirus/hcp/hospdischarge.pdf  [facility] clinical trials (COVID-19 research studies): clinicalaffairs.[facility]/n-clinical-trials  Below are the COVID-19  hotlines at the [place] Department of Health ([facility]). Interpreters are available.  For health questions: Call 973-472-1503 or 1-132.200.4418 (7 a.m. to 7 p.m.)  For questions about schools and childcare: Call 757-575-5776 or 1-742.576.6633 (7 a.m. to 7 p.m.)    Diagnosis: Contact with and (suspected) exposure to other viral communicable diseases  Diagnosis ICD: Z20.828

## 2020-09-23 ENCOUNTER — ANCILLARY PROCEDURE (OUTPATIENT)
Dept: GENERAL RADIOLOGY | Facility: CLINIC | Age: 50
End: 2020-09-23
Attending: FAMILY MEDICINE
Payer: COMMERCIAL

## 2020-09-23 ENCOUNTER — OFFICE VISIT (OUTPATIENT)
Dept: ORTHOPEDICS | Facility: CLINIC | Age: 50
End: 2020-09-23
Payer: COMMERCIAL

## 2020-09-23 ENCOUNTER — VIRTUAL VISIT (OUTPATIENT)
Dept: BEHAVIORAL HEALTH | Facility: CLINIC | Age: 50
End: 2020-09-23
Payer: COMMERCIAL

## 2020-09-23 ENCOUNTER — THERAPY VISIT (OUTPATIENT)
Dept: PHYSICAL THERAPY | Facility: CLINIC | Age: 50
End: 2020-09-23
Payer: COMMERCIAL

## 2020-09-23 VITALS
SYSTOLIC BLOOD PRESSURE: 142 MMHG | BODY MASS INDEX: 26.84 KG/M2 | DIASTOLIC BLOOD PRESSURE: 86 MMHG | HEIGHT: 67 IN | WEIGHT: 171 LBS

## 2020-09-23 DIAGNOSIS — M75.02 ADHESIVE CAPSULITIS OF LEFT SHOULDER: Primary | ICD-10-CM

## 2020-09-23 DIAGNOSIS — M75.101 ROTATOR CUFF SYNDROME, RIGHT: ICD-10-CM

## 2020-09-23 DIAGNOSIS — M25.512 BILATERAL SHOULDER PAIN: ICD-10-CM

## 2020-09-23 DIAGNOSIS — M25.511 BILATERAL SHOULDER PAIN: ICD-10-CM

## 2020-09-23 DIAGNOSIS — M25.512 LEFT SHOULDER PAIN, UNSPECIFIED CHRONICITY: ICD-10-CM

## 2020-09-23 DIAGNOSIS — F10.10 ALCOHOL USE DISORDER, MILD, ABUSE: Primary | ICD-10-CM

## 2020-09-23 PROCEDURE — 73030 X-RAY EXAM OF SHOULDER: CPT | Mod: LT

## 2020-09-23 PROCEDURE — 97140 MANUAL THERAPY 1/> REGIONS: CPT | Mod: GP | Performed by: PHYSICAL THERAPIST

## 2020-09-23 PROCEDURE — 97110 THERAPEUTIC EXERCISES: CPT | Mod: GP | Performed by: PHYSICAL THERAPIST

## 2020-09-23 PROCEDURE — 90834 PSYTX W PT 45 MINUTES: CPT | Mod: GT | Performed by: SOCIAL WORKER

## 2020-09-23 PROCEDURE — 99204 OFFICE O/P NEW MOD 45 MIN: CPT | Performed by: FAMILY MEDICINE

## 2020-09-23 ASSESSMENT — MIFFLIN-ST. JEOR: SCORE: 1594.28

## 2020-09-23 NOTE — PROGRESS NOTES
"[facility]   Clinic Visit s Sep 23, 2020    PCP: Milad Roberson    ASSESSMENT/PLAN    ICD-10-CM    1. Adhesive capsulitis of left shoulder  M75.02 [facility]   2. Rotator cuff syndrome, right  M75.101 [facility]         Nature of frozen shoulder discussed.  Recommended continued physical therapy for both shoulders though treatment protocols are different.  Discussed cortisone injections and noted that the glenohumeral injection for the left shoulder would require ultrasound guidance.  Given instructions on how to schedule this if he desires though he reports adequate sleep and no significant pain on the left.    Return as needed.      Today's Visit:  Nick is a 50 year old male who is seen as self referral for   Chief Complaint   Patient presents with     Left Shoulder - Pain       Injury: Reports insidious onset without acute precipitating event.     Right hand dominant    Location of Pain: left shoulder lateral, nonradiating . Reports shoulder is \"immobile\". Possible triggered by meds. RIGHT shoulder similar but not as bad.     Duration of Pain: acute, 3 month(s),   Rating of Pain at worst: 10/10  Rating of Pain Currently: 1/10  Pain is better with: nothing   Pain is worse with: lifting, movement, raising his arm  Treatment so far consists of: other medications: Prednisone (Medrol) - beginning of august - got rid of most of pain, ibuprofen, tylenol, PHYSICAL THERAPY   Associated symptoms: numbness and tingling in hand, pain - some symptoms a year ago, nothing until just yesterday  Recent imaging completed: X-rays completed 9/23/20.  Prior History of related problems: RIGHT elbow fracture - old - loss of elbow ROM    Has continued PHYSICAL THERAPY planned.    Social History: is employed as a realtor        Patient Active Problem List    Diagnosis Date Noted     Bilateral shoulder pain 09/02/2020     " "Priority: Medium     Alcohol use disorder, mild, abuse 07/08/2020     Priority: Medium       FMHX denies sig illnesses.    Social History     Socioeconomic History     Marital status:      Spouse name: None     Number of children: None     Years of education: None     Highest education level: None   Occupational History     None   Social Needs     Financial resource strain: None     Food insecurity     Worry: None     Inability: None     Transportation needs     Medical: None     Non-medical: None   Tobacco Use     Smoking status: Former Smoker     Smokeless tobacco: Never Used   Substance and Sexual Activity     Alcohol use: Yes     Comment: 3 drinks per week     Drug use: Never     Sexual activity: Yes     Partners: Male   Lifestyle     Physical activity     Days per week: None     Minutes per session: None     Stress: None   Relationships     Social connections     Talks on phone: None     Gets together: None     Attends Mandaen service: None     Active member of club or organization: None     Attends meetings of clubs or organizations: None     Relationship status: None     Intimate partner violence     Fear of current or ex partner: None     Emotionally abused: None     Physically abused: None     Forced sexual activity: None   Other Topics Concern     None   Social History Narrative     None       Past Surgical History:   Procedure Laterality Date     ORTHOPEDIC SURGERY Right     Right elbow break, early 90's           Review of Systems   Musculoskeletal: Positive for joint pain.   All other systems reviewed and are negative.        Physical Exam    BP (!) 142/86 (BP Location: Left arm, Patient Position: Chair)   Ht 1.702 m (5' 7\")   Wt 77.6 kg (171 lb)   BMI 26.78 kg/m    Constitutional:well-developed, well-nourished, and in no distress.   Cardiovascular: Intact distal pulses.    Neurological: alert. Gait Normal:   Gait, station, stance, and balance appear normal for age  Skin: Skin is warm and " dry.   Psychiatric: Mood and affect normal.   Respiratory: unlabored, speaks in full sentences  Lymph: no LAD, no lymphangitis          Right Shoulder Exam     Tenderness   The patient is experiencing no tenderness.    Range of Motion   Active abduction: normal   Passive abduction: normal   Extension: normal   External rotation: normal   Forward flexion: normal   Internal rotation 0 degrees: normal     Muscle Strength   Abduction: 5/5   Internal rotation: 5/5   External rotation: 5/5   Supraspinatus: 5/5   Subscapularis: 5/5   Biceps: 5/5     Tests   Apprehension: negative  Cardenas test: positive  Cross arm: negative  Impingement: positive  Drop arm: negative  Sulcus: absent    Other   Erythema: absent  Scars: absent  Sensation: normal  Pulse: present      Left Shoulder Exam     Tenderness   Left shoulder tenderness location: Anterior capsule.    Range of Motion   Active abduction: 60   Passive abduction: 60   Extension: normal   External rotation: 30   Forward flexion: 70   Internal rotation 0 degrees: Sacrum     Muscle Strength   Abduction: 5/5   Internal rotation: 5/5   External rotation: 5/5   Supraspinatus: 5/5   Subscapularis: 5/5   Biceps: 5/5     Tests   Cardenas test: positive  Cross arm: negative  Impingement: positive  Drop arm: positive    Other   Erythema: absent  Scars: absent  Sensation: normal  Pulse: present             X-ray images Ordered and independently reviewed by me in the office today with the patient. X-ray shows:   Recent Results (from the past 48 hour(s))   XR Shoulder Left G/E 3 Views    Narrative    LEFT SHOULDER THREE OR MORE VIEWS 9/23/2020 8:50 AM     HISTORY: Left shoulder pain, unspecified chronicity.    COMPARISON: None.      Impression    IMPRESSION: Mild acromioclavicular degenerative changes. Glenohumeral  joint is unremarkable. No evidence of fracture.

## 2020-09-23 NOTE — LETTER
"    9/23/2020         RE: Nick Montiel  [address]  [place] [address]        Dear Colleague,    Thank you for referring your patient, Nick Montiel, to the  SP[facility]TS [facility]. Please see a copy of my visit note below.    [facility]   Clinic Visit s Sep 23, 2020    PCP: Milad Roberson    ASSESSMENT/PLAN    ICD-10-CM    1. Adhesive capsulitis of left shoulder  M75.02 [facility]   2. Rotator cuff syndrome, right  M75.101 [facility]         Nature of frozen shoulder discussed.  Recommended continued physical therapy for both shoulders though treatment protocols are different.  Discussed cortisone injections and noted that the glenohumeral injection for the left shoulder would require ultrasound guidance.  Given instructions on how to schedule this if he desires though he reports adequate sleep and no significant pain on the left.    Return as needed.      Today's Visit:  Nick is a 50 year old male who is seen as self referral for   Chief Complaint   Patient presents with     Left Shoulder - Pain       Injury: Reports insidious onset without acute precipitating event.     Right hand dominant    Location of Pain: left shoulder lateral, nonradiating . Reports shoulder is \"immobile\". Possible triggered by meds. RIGHT shoulder similar but not as bad.     Duration of Pain: acute, 3 month(s),   Rating of Pain at worst: 10/10  Rating of Pain Currently: 1/10  Pain is better with: nothing   Pain is worse with: lifting, movement, raising his arm  Treatment so far consists of: other medications: Prednisone (Medrol) - beginning of august - got rid of most of pain, ibuprofen, tylenol, PHYSICAL THERAPY   Associated symptoms: numbness and tingling in hand, pain - some symptoms a year ago, nothing until just yesterday  Recent imaging completed: X-rays completed 9/23/20.  Prior History of " "related problems: RIGHT elbow fracture - old - loss of elbow ROM    Has continued PHYSICAL THERAPY planned.    Social History: is employed as a realtor        Patient Active Problem List    Diagnosis Date Noted     Bilateral shoulder pain 09/02/2020     Priority: Medium     Alcohol use disorder, mild, abuse 07/08/2020     Priority: Medium       FMHX denies sig illnesses.    Social History     Socioeconomic History     Marital status:      Spouse name: None     Number of children: None     Years of education: None     Highest education level: None   Occupational History     None   Social Needs     Financial resource strain: None     Food insecurity     Worry: None     Inability: None     Transportation needs     Medical: None     Non-medical: None   Tobacco Use     Smoking status: Former Smoker     Smokeless tobacco: Never Used   Substance and Sexual Activity     Alcohol use: Yes     Comment: 3 drinks per week     Drug use: Never     Sexual activity: Yes     Partners: Male   Lifestyle     Physical activity     Days per week: None     Minutes per session: None     Stress: None   Relationships     Social connections     Talks on phone: None     Gets together: None     Attends Buddhism service: None     Active member of club or organization: None     Attends meetings of clubs or organizations: None     Relationship status: None     Intimate partner violence     Fear of current or ex partner: None     Emotionally abused: None     Physically abused: None     Forced sexual activity: None   Other Topics Concern     None   Social History Narrative     None       Past Surgical History:   Procedure Laterality Date     ORTHOPEDIC SURGERY Right     Right elbow break, early 90's           Review of Systems   Musculoskeletal: Positive for joint pain.   All other systems reviewed and are negative.        Physical Exam    BP (!) 142/86 (BP Location: Left arm, Patient Position: Chair)   Ht 1.702 m (5' 7\")   Wt 77.6 kg (171 " lb)   BMI 26.78 kg/m    Constitutional:well-developed, well-nourished, and in no distress.   Cardiovascular: Intact distal pulses.    Neurological: alert. Gait Normal:   Gait, station, stance, and balance appear normal for age  Skin: Skin is warm and dry.   Psychiatric: Mood and affect normal.   Respiratory: unlabored, speaks in full sentences  Lymph: no LAD, no lymphangitis          Right Shoulder Exam     Tenderness   The patient is experiencing no tenderness.    Range of Motion   Active abduction: normal   Passive abduction: normal   Extension: normal   External rotation: normal   Forward flexion: normal   Internal rotation 0 degrees: normal     Muscle Strength   Abduction: 5/5   Internal rotation: 5/5   External rotation: 5/5   Supraspinatus: 5/5   Subscapularis: 5/5   Biceps: 5/5     Tests   Apprehension: negative  Cardenas test: positive  Cross arm: negative  Impingement: positive  Drop arm: negative  Sulcus: absent    Other   Erythema: absent  Scars: absent  Sensation: normal  Pulse: present      Left Shoulder Exam     Tenderness   Left shoulder tenderness location: Anterior capsule.    Range of Motion   Active abduction: 60   Passive abduction: 60   Extension: normal   External rotation: 30   Forward flexion: 70   Internal rotation 0 degrees: Sacrum     Muscle Strength   Abduction: 5/5   Internal rotation: 5/5   External rotation: 5/5   Supraspinatus: 5/5   Subscapularis: 5/5   Biceps: 5/5     Tests   Cardenas test: positive  Cross arm: negative  Impingement: positive  Drop arm: positive    Other   Erythema: absent  Scars: absent  Sensation: normal  Pulse: present             X-ray images Ordered and independently reviewed by me in the office today with the patient. X-ray shows:   Recent Results (from the past 48 hour(s))   XR Shoulder Left G/E 3 Views    Narrative    LEFT SHOULDER THREE OR MORE VIEWS 9/23/2020 8:50 AM     HISTORY: Left shoulder pain, unspecified chronicity.    COMPARISON: None.       Impression    IMPRESSION: Mild acromioclavicular degenerative changes. Glenohumeral  joint is unremarkable. No evidence of fracture.       Again, thank you for allowing me to participate in the care of your patient.        Sincerely,        Khoi Corona MD

## 2020-09-23 NOTE — PROGRESS NOTES
Progress Note    Patient Name: Nick Montiel  Date: 9/23/2020         Service Type: Individual      Session Start Time: 10:33  Session End Time: 11:25     Session Length: 52    Session #: 11    Attendees: Client attended alone    Service Modality:  Video Visit:    Telemedicine Visit: The patient's condition can be safely assessed and treated via synchronous audio and visual telemedicine encounter.      Reason for Telemedicine Visit: Services only offered telehealth    Originating Site (Patient Location): Patient's home    Distant Site (Provider Location): Provider Remote Setting    Consent:  The patient/guardian has verbally consented to: the potential risks and benefits of telemedicine (video visit) versus in person care; bill my insurance or make self-payment for services provided; and responsibility for payment of non-covered services.     Patient would like the video invitation sent by: Send to e-mail at: [email or web address]     Mode of Communication:  Video Conference via [employer]    As the provider I attest to compliance with applicable laws and regulations related to telemedicine.     Treatment Plan Last Reviewed: 7/8/2020    DATA  Interactive Complexity: No  Crisis: No       Progress Since Last Session (Related to Symptoms / Goals / Homework):   Symptoms: No change Reports that anger has continued to be reduced    Homework: reports that he didn't have situations were this came up      Episode of Care Goals: Satisfactory progress - CONTEMPLATION (Considering change and yet undecided); Intervened by assessing the negative and positive thinking (ambivalence) about behavior change     Current / Ongoing Stressors and Concerns:   Patient reports stress with new job, having a new schedule and not being able to take naps throughout the day.     Treatment Objective(s) Addressed in This Session:   Patient identified thoughts related to  anger       Intervention:   CBT: Worked with patient to identify their thoughts related to thoughts that their  might leave them.  Discussed patient's guilt about past behaviors and his uneasiness that he will repeat past mistakes.        ASSESSMENT: Current Emotional / Mental Status (status of significant symptoms):   Risk status (Self / Other harm or suicidal ideation)   Patient denies current fears or concerns for personal safety.   Patient denies current or recent suicidal ideation or behaviors.   Patient denies current or recent homicidal ideation or behaviors.   Patient denies current or recent self injurious behavior or ideation.   Patient denies other safety concerns.   Patient reports there has been no change in risk factors since their last session.     Patient reports there has been no change in protective factors since their last session.     Recommended that patient call 911 or go to the local ED should there be a change in any of these risk factors.     Appearance:   Appropriate    Eye Contact:   Good    Psychomotor Behavior: Normal  Restless    Attitude:   Cooperative  Interested Playful Evasive   Orientation:   All   Speech    Rate / Production: Normal/ Responsive    Volume:  Normal    Mood:    Stressed   Affect:    Appropriate    Thought Content:  Clear    Thought Form:  Coherent  Logical  Circumstantial   Insight:    Fair      Medication Review:   No current psychiatric medications prescribed     Medication Compliance:   NA     Changes in Health Issues:   None reported     Chemical Use Review:   Substance Use: Problem use continues with no change since last session, Stage of Change: Pre-contemplation        Tobacco Use: No current tobacco use.      Diagnosis:  1. Alcohol use disorder, mild, abuse        Collateral Reports Completed:   Not Applicable    PLAN: (Patient Tasks / Therapist Tasks / Other)  Patient will come prepared to discuss thoughts related to his anger.        Rossy  Molly, LETICIA                                                         ______________________________________________________________________    Treatment Plan    Patient's Name: Nick Montiel  YOB: 1970    Date: 7/8/2020    DSM5 Diagnoses: Substance-Related & Addictive Disorders Alcohol Use Disorder   303.90 (F10.20) Moderate continued use  Psychosocial / Contextual Factors:  Individual Factors has had problems with alcohol use his adult lifetime, has stolen items and engaged in high risk sexual behaivors more historically then recently, Family Factors Reports a negative relationship with mom and does not speak with dad.  His current partner is concerend about his alcohol use and his mood swings that cause anger, Community Factors he has experienced homelessness and has been engaged in the [employer] community and has performed drag and Economic Factors currently works as a  and is economically comfortable and has a history of difficulty maintaining employement.  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      Objective #A (Patient Action)    Patient will Identify negative self-talk and behaviors: challenge core beliefs, myths, and actions.  Status: New - Date: 7/8/2020     Intervention(s)  Therapist will assign homework to engage in cognitive restructuring  provide support to critically look at how negative beliefs keep you from moving forward .    Objective #B  Patient will identify core beliefs and the times in your life that originally created  beliefs.  Status: New - Date: 7/8/2020     Intervention(s)  Therapist will assign homework to challenge core beliefs  provide educational materials on core beliefs and lifetime patterns  teach how to challenge core beliefs.    Objective #C  Patient will will be working towards his goals .  Status: New - Date: 7/8/2020     Intervention(s)  Therapist will assign homework to continue to use skills from therapy  provide support to negotiate what works and what patient continues to struggle with.      Goal 2: Patient will not be so negative     I will know I've met my goal when My  would tell me I am nicer, I would be able to define what it means to be a more positive person.      Objective #A (Patient Action)    Status: New - Date: 7/8/2020     Patient will identify feelings and emotions that occur prior to more negative behaviors.    Intervention(s)  Therapist will assign homework to track patient thoughts and feelings  provide educational materials on cognitive distortions  teach cognitive restructuring.    Objective #B  Patient will identify atleast 3 other responses that I can have other than negative.    Status: New - Date: 7/8/2020     Intervention(s)  Therapist will assign homework to practice other types of responses  provide educational materials on effective ways to communicate with others  role-play more effective ways to communicate with others.    Objective #C  Patient will learn and demonstrate 3 assertiveness skill(s).  Status: New - Date: 7/8/2020     Intervention(s)  Therapist will assign homework to use assertive communication  role-play assertiveness skills.      Goal 3: Patient will find a hobby or interest    I will know I've met my goal when I go back to school and feel like I have things that are meaningful.      Objective #A (Patient Action)    Status: New - Date: 7/8/2020     Patient will Identify what he would like to do and steps that would get him there.    Intervention(s)  Therapist  will assign homework to decide on steps to get towards goals  teach the client how to complete a 4-part pros and cons. to help with decision making.    Objective #B  Patient will identify barriers that keep him from moving forward with his goals.    Status: New - Date: 7/8/2020     Intervention(s)  Therapist will assign homework to note barriers   teach problem-sovling model.    Objective #C  Patient will engage in problem solving to help him move forward with his interst.  Status: New - Date: 7/8/2020     Intervention(s)  Therapist will assign homework to carry out possible solutions  provide support to gauge how problem-solving is going and other ways to look at issues.      Patient has reviewed and agreed to the above plan.      Rossy Barnes, ANNAMARIESW  July 8, 2020

## 2020-09-23 NOTE — PATIENT INSTRUCTIONS
Ultrasound Procedure scheduling instructions    Call [facility] central scheduling:  - [phone]    Tell them you need a 40-minute procedure appointment with Dr. Corona at the [facility].     Specify which joint is being injected.    At the present time, these are only done at the [facility], next to [facility]:  [address], [address], [facility]      If you are having any trouble getting to your appointment on time, please call the  of the [facility]: [phone]

## 2020-09-24 DIAGNOSIS — Z20.822 SUSPECTED 2019 NOVEL CORONAVIRUS INFECTION: ICD-10-CM

## 2020-09-24 PROCEDURE — U0003 INFECTIOUS AGENT DETECTION BY NUCLEIC ACID (DNA OR RNA); SEVERE ACUTE RESPIRATORY SYNDROME CORONAVIRUS 2 (SARS-COV-2) (CORONAVIRUS DISEASE [COVID-19]), AMPLIFIED PROBE TECHNIQUE, MAKING USE OF HIGH THROUGHPUT TECHNOLOGIES AS DESCRIBED BY CMS-2020-01-R: HCPCS | Performed by: FAMILY MEDICINE

## 2020-09-25 LAB
SARS-COV-2 RNA SPEC QL NAA+PROBE: NOT DETECTED
SPECIMEN SOURCE: NORMAL

## 2020-09-30 ENCOUNTER — VIRTUAL VISIT (OUTPATIENT)
Dept: BEHAVIORAL HEALTH | Facility: CLINIC | Age: 50
End: 2020-09-30
Payer: COMMERCIAL

## 2020-09-30 ENCOUNTER — THERAPY VISIT (OUTPATIENT)
Dept: PHYSICAL THERAPY | Facility: CLINIC | Age: 50
End: 2020-09-30
Payer: COMMERCIAL

## 2020-09-30 DIAGNOSIS — F10.10 ALCOHOL USE DISORDER, MILD, ABUSE: Primary | ICD-10-CM

## 2020-09-30 DIAGNOSIS — M25.511 BILATERAL SHOULDER PAIN: ICD-10-CM

## 2020-09-30 DIAGNOSIS — M25.512 BILATERAL SHOULDER PAIN: ICD-10-CM

## 2020-09-30 PROCEDURE — 97110 THERAPEUTIC EXERCISES: CPT | Mod: GP | Performed by: PHYSICAL THERAPIST

## 2020-09-30 PROCEDURE — 97140 MANUAL THERAPY 1/> REGIONS: CPT | Mod: GP | Performed by: PHYSICAL THERAPIST

## 2020-09-30 PROCEDURE — 90834 PSYTX W PT 45 MINUTES: CPT | Mod: GT | Performed by: SOCIAL WORKER

## 2020-10-07 ENCOUNTER — VIRTUAL VISIT (OUTPATIENT)
Dept: BEHAVIORAL HEALTH | Facility: CLINIC | Age: 50
End: 2020-10-07
Payer: COMMERCIAL

## 2020-10-07 ENCOUNTER — THERAPY VISIT (OUTPATIENT)
Dept: PHYSICAL THERAPY | Facility: CLINIC | Age: 50
End: 2020-10-07
Payer: COMMERCIAL

## 2020-10-07 DIAGNOSIS — F10.10 ALCOHOL USE DISORDER, MILD, ABUSE: Primary | ICD-10-CM

## 2020-10-07 DIAGNOSIS — M25.512 BILATERAL SHOULDER PAIN: ICD-10-CM

## 2020-10-07 DIAGNOSIS — M25.511 BILATERAL SHOULDER PAIN: ICD-10-CM

## 2020-10-07 PROCEDURE — 90834 PSYTX W PT 45 MINUTES: CPT | Mod: GT | Performed by: SOCIAL WORKER

## 2020-10-07 PROCEDURE — 97140 MANUAL THERAPY 1/> REGIONS: CPT | Mod: GP | Performed by: PHYSICAL THERAPIST

## 2020-10-07 PROCEDURE — 97110 THERAPEUTIC EXERCISES: CPT | Mod: GP | Performed by: PHYSICAL THERAPIST

## 2020-10-07 NOTE — PROGRESS NOTES
Progress Note    Patient Name: Nick Montiel  Date: 10/7/2020         Service Type: Individual      Session Start Time: 9:30  Session End Time: 10:22     Session Length: 52    Session #: 13    Attendees: Client attended alone    Service Modality:  Video Visit:    Telemedicine Visit: The patient's condition can be safely assessed and treated via synchronous audio and visual telemedicine encounter.      Reason for Telemedicine Visit: Services only offered telehealth    Originating Site (Patient Location): Patient's home    Distant Site (Provider Location): Provider Remote Setting    Consent:  The patient/guardian has verbally consented to: the potential risks and benefits of telemedicine (video visit) versus in person care; bill my insurance or make self-payment for services provided; and responsibility for payment of non-covered services.     Patient would like the video invitation sent by: Send to e-mail at: [email or web address]     Mode of Communication:  Video Conference via [employer]    As the provider I attest to compliance with applicable laws and regulations related to telemedicine.     Treatment Plan Last Reviewed: 7/8/2020    DATA  Interactive Complexity: No  Crisis: No       Progress Since Last Session (Related to Symptoms / Goals / Homework):   Symptoms: No change Stressful events continue and patient reports concerns about job.    Homework: Achieved / completed to satisfaction      Episode of Care Goals: Achieved / completed to satisfaction - PREPARATION (Decided to change - considering how); Intervened by negotiating a change plan and determining options / strategies for behavior change, identifying triggers, exploring social supports, and working towards setting a date to begin behavior change     Current / Ongoing Stressors and Concerns:   Patient reports stress with new job, concern about boss and concern about his abilities in new job. He reported  that moving into his new apartment continues to be stressful, but that there has been progress.     Treatment Objective(s) Addressed in This Session:   learn & utilize at least 1 assertive communication skills weekly       Intervention:   Solution Focused: Discussed how patient did not communicate needs to employer and how he could communicate more clearly in the future.  Patient will make sure to check-in with boss and employee training him if he does not have tasks.        ASSESSMENT: Current Emotional / Mental Status (status of significant symptoms):   Risk status (Self / Other harm or suicidal ideation)   Patient denies current fears or concerns for personal safety.   Patient denies current or recent suicidal ideation or behaviors.   Patient denies current or recent homicidal ideation or behaviors.   Patient denies current or recent self injurious behavior or ideation.   Patient denies other safety concerns.   Patient reports there has been no change in risk factors since their last session.     Patient reports there has been no change in protective factors since their last session.     Recommended that patient call 911 or go to the local ED should there be a change in any of these risk factors.     Appearance:   Appropriate    Eye Contact:   Good    Psychomotor Behavior: Normal  Restless    Attitude:   Cooperative  Interested Playful Evasive   Orientation:   All   Speech    Rate / Production: Normal/ Responsive    Volume:  Normal    Mood:    Stressed   Affect:    Appropriate    Thought Content:  Clear    Thought Form:  Coherent  Logical  Circumstantial   Insight:    Fair      Medication Review:   No current psychiatric medications prescribed     Medication Compliance:   NA     Changes in Health Issues:   None reported     Chemical Use Review:   Substance Use: Problem use continues with no change since last session, Stage of Change: Pre-contemplation        Tobacco Use: No current tobacco use.       Diagnosis:  1. Alcohol use disorder, mild, abuse        Collateral Reports Completed:   Not Applicable    PLAN: (Patient Tasks / Therapist Tasks / Other)  Patient will ask for support he needs at work.        Rossy Barnes, LETICIA                                                         ______________________________________________________________________    Treatment Plan    Patient's Name: Nick Montiel  YOB: 1970    Date: 7/8/2020    DSM5 Diagnoses: Substance-Related & Addictive Disorders Alcohol Use Disorder   303.90 (F10.20) Moderate continued use  Psychosocial / Contextual Factors:  Individual Factors has had problems with alcohol use his adult lifetime, has stolen items and engaged in high risk sexual behaivors more historically then recently, Family Factors Reports a negative relationship with mom and does not speak with dad.  His current partner is concerend about his alcohol use and his mood swings that cause anger, Community Factors he has experienced homelessness and has been engaged in the [employer] community and has performed drag and Economic Factors currently works as a  and is economically comfortable and has a history of difficulty maintaining employement.  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      Objective #A (Patient Action)    Patient will Identify negative self-talk and behaviors: challenge core beliefs, myths, and actions.  Status: New - Date: 7/8/2020     Intervention(s)  Therapist will assign homework to engage in cognitive  restructuring  provide support to critically look at how negative beliefs keep you from moving forward .    Objective #B  Patient will identify core beliefs and the times in your life that originally created beliefs.  Status: New - Date: 7/8/2020     Intervention(s)  Therapist will assign homework to challenge core beliefs  provide educational materials on core beliefs and lifetime patterns  teach how to challenge core beliefs.    Objective #C  Patient will will be working towards his goals .  Status: New - Date: 7/8/2020     Intervention(s)  Therapist will assign homework to continue to use skills from therapy  provide support to negotiate what works and what patient continues to struggle with.      Goal 2: Patient will not be so negative     I will know I've met my goal when My  would tell me I am nicer, I would be able to define what it means to be a more positive person.      Objective #A (Patient Action)    Status: New - Date: 7/8/2020     Patient will identify feelings and emotions that occur prior to more negative behaviors.    Intervention(s)  Therapist will assign homework to track patient thoughts and feelings  provide educational materials on cognitive distortions  teach cognitive restructuring.    Objective #B  Patient will identify atleast 3 other responses that I can have other than negative.    Status: New - Date: 7/8/2020     Intervention(s)  Therapist will assign homework to practice other types of responses  provide educational materials on effective ways to communicate with others  role-play more effective ways to communicate with others.    Objective #C  Patient will learn and demonstrate 3 assertiveness skill(s).  Status: New - Date: 7/8/2020     Intervention(s)  Therapist will assign homework to use assertive communication  role-play assertiveness skills.      Goal 3: Patient will find a hobby or interest    I will know I've met my goal when I go back to school and feel like I have  things that are meaningful.      Objective #A (Patient Action)    Status: New - Date: 7/8/2020     Patient will Identify what he would like to do and steps that would get him there.    Intervention(s)  Therapist will assign homework to decide on steps to get towards goals  teach the client how to complete a 4-part pros and cons. to help with decision making.    Objective #B  Patient will identify barriers that keep him from moving forward with his goals.    Status: New - Date: 7/8/2020     Intervention(s)  Therapist will assign homework to note barriers   teach problem-sovling model.    Objective #C  Patient will engage in problem solving to help him move forward with his interst.  Status: New - Date: 7/8/2020     Intervention(s)  Therapist will assign homework to carry out possible solutions  provide support to gauge how problem-solving is going and other ways to look at issues.      Patient has reviewed and agreed to the above plan.      Rossy Barnes, [employer]  July 8, 2020

## 2020-10-14 ENCOUNTER — VIRTUAL VISIT (OUTPATIENT)
Dept: BEHAVIORAL HEALTH | Facility: CLINIC | Age: 50
End: 2020-10-14
Payer: COMMERCIAL

## 2020-10-14 ENCOUNTER — THERAPY VISIT (OUTPATIENT)
Dept: PHYSICAL THERAPY | Facility: CLINIC | Age: 50
End: 2020-10-14
Payer: COMMERCIAL

## 2020-10-14 DIAGNOSIS — F10.10 ALCOHOL USE DISORDER, MILD, ABUSE: Primary | ICD-10-CM

## 2020-10-14 DIAGNOSIS — M25.512 BILATERAL SHOULDER PAIN: ICD-10-CM

## 2020-10-14 DIAGNOSIS — M25.511 BILATERAL SHOULDER PAIN: ICD-10-CM

## 2020-10-14 PROCEDURE — 97140 MANUAL THERAPY 1/> REGIONS: CPT | Mod: GP | Performed by: PHYSICAL THERAPIST

## 2020-10-14 PROCEDURE — 90834 PSYTX W PT 45 MINUTES: CPT | Mod: GT | Performed by: SOCIAL WORKER

## 2020-10-14 PROCEDURE — 97110 THERAPEUTIC EXERCISES: CPT | Mod: GP | Performed by: PHYSICAL THERAPIST

## 2020-10-14 NOTE — PROGRESS NOTES
Progress Note    Patient Name: Nick Montiel  Date: 10/14/2020         Service Type: Individual      Session Start Time: 16:00  Session End Time: 16:52     Session Length: 52    Session #: 14    Attendees: Client attended alone    Service Modality:  Video Visit:    Telemedicine Visit: The patient's condition can be safely assessed and treated via synchronous audio and visual telemedicine encounter.      Reason for Telemedicine Visit: Services only offered telehealth    Originating Site (Patient Location): Patient's home    Distant Site (Provider Location): Provider Remote Setting    Consent:  The patient/guardian has verbally consented to: the potential risks and benefits of telemedicine (video visit) versus in person care; bill my insurance or make self-payment for services provided; and responsibility for payment of non-covered services.     Patient would like the video invitation sent by: Send to e-mail at: [email or web address]     Mode of Communication:  Video Conference via Amwell    As the provider I attest to compliance with applicable laws and regulations related to telemedicine.     Treatment Plan Last Reviewed: 10/14/2020    DATA  Interactive Complexity: No  Crisis: No       Progress Since Last Session (Related to Symptoms / Goals / Homework):   Symptoms: Patient reports increased stress with moving and new job.  He reports being shorter with spouse.  He notes concerns about his grumpiness being related to either his physical health and also how he manages his stress levels.    Homework: Achieved / completed to satisfaction      Episode of Care Goals: Achieved / completed to satisfaction - PREPARATION (Decided to change - considering how); Intervened by negotiating a change plan and determining options / strategies for behavior change, identifying triggers, exploring social supports, and working towards setting a date to begin behavior  change     Current / Ongoing Stressors and Concerns:   Patient reports stress with new job, concern about boss and concern about his abilities in new job. He reported that moving into his new apartment continues to be stressful, but that there has been progress.     Treatment Objective(s) Addressed in This Session:   Identify 2-3 ways to address stressors that lead to angry responses       Intervention:   Solution Focused: Reviewed goals with patient and asked patient where he sees himself with progress towards goals.  Patient was able to identify that he needs to work on his responses to his .  That he let's stress build and can come home and be angry.  Patient agreed that he can check in with doctor on physical health reasons, start eating snacks to help with hunger and work on getting back into exercise.        ASSESSMENT: Current Emotional / Mental Status (status of significant symptoms):   Risk status (Self / Other harm or suicidal ideation)   Patient denies current fears or concerns for personal safety.   Patient denies current or recent suicidal ideation or behaviors.   Patient denies current or recent homicidal ideation or behaviors.   Patient denies current or recent self injurious behavior or ideation.   Patient denies other safety concerns.   Patient reports there has been no change in risk factors since their last session.     Patient reports there has been no change in protective factors since their last session.     Recommended that patient call 911 or go to the local ED should there be a change in any of these risk factors.     Appearance:   Appropriate    Eye Contact:   Good    Psychomotor Behavior: Normal  Restless    Attitude:   Cooperative  Interested Playful Evasive   Orientation:   All   Speech    Rate / Production: Normal/ Responsive    Volume:  Normal    Mood:    Stressed   Affect:    Appropriate    Thought Content:  Clear    Thought Form:  Coherent  Logical    Insight:    Fair       Medication Review:   No current psychiatric medications prescribed     Medication Compliance:   NA     Changes in Health Issues:   None reported     Chemical Use Review:   Substance Use: Problem use continues with no change since last session, Stage of Change: Contemplation        Tobacco Use: No current tobacco use.      Diagnosis:  1. Alcohol use disorder, mild, abuse        Collateral Reports Completed:   Not Applicable    PLAN: (Patient Tasks / Therapist Tasks / Other)   Patient agreed that he can check in with doctor on physical health reasons, start eating snacks to help with hunger and work on getting back into exercise.        Rossy Barnes, LICSW                                                         ______________________________________________________________________    Treatment Plan    Patient's Name: Nick Montiel  YOB: 1970    Date: 10/14/2020    DSM5 Diagnoses: Substance-Related & Addictive Disorders Alcohol Use Disorder   303.90 (F10.20) Moderate continued use  Psychosocial / Contextual Factors:  Individual Factors has had problems with alcohol use his adult lifetime, has stolen items and engaged in high risk sexual behaivors more historically then recently, Family Factors Reports a negative relationship with mom and does not speak with dad.  His current partner is concerend about his alcohol use and his mood swings that cause anger, Community Factors he has experienced homelessness and has been engaged in the [employer] community and has performed drag and Economic Factors currently works as a  and is economically comfortable and has a history of difficulty maintaining employement.  WHODAS:     Referral / Collaboration:  The following referral(s) will be initiated: Group support to limit alcohol [email or web address] or contact Daphne Chen     Anticipated number of session or this episode of care: 10-20      MeasurableTreatment Goal(s) related to  diagnosis / functional impairment(s)  Goal 1: Patient will not give up easily    I will know I've met my goal when I am finishing what I started, and completed goals.      Objective #A (Patient Action)    Patient will Identify negative self-talk and behaviors: challenge core beliefs, myths, and actions.  Status: Continued - Date(s): 10/14/2020     Intervention(s)  Therapist will assign homework to engage in cognitive restructuring  provide support to critically look at how negative beliefs keep you from moving forward .    Objective #B  Patient will identify core beliefs and the times in your life that originally created beliefs.  Status: Continued - Date(s): 10/14/2020     Intervention(s)  Therapist will assign homework to challenge core beliefs  provide educational materials on core beliefs and lifetime patterns  teach how to challenge core beliefs.    Objective #C  Patient will will be working towards his goals .  Status: Continued - Date(s): 10/14/2020     Intervention(s)  Therapist will assign homework to continue to use skills from therapy  provide support to negotiate what works and what patient continues to struggle with.      Goal 2: Patient will not be so negative     I will know I've met my goal when My  would tell me I am nicer, I would be able to define what it means to be a more positive person.      Objective #A (Patient Action)    Status: Continued - Date(s): 10/14/2020     Patient will identify feelings and emotions that occur prior to more negative behaviors.    Intervention(s)  Therapist will assign homework to track patient thoughts and feelings  provide educational materials on cognitive distortions  teach cognitive restructuring.    Objective #B  Patient will identify atleast 3 other responses that I can have other than negative.    Status: Continued - Date(s): 10/14/2020     Intervention(s)  Therapist will assign homework to practice other types of responses  provide educational  materials on effective ways to communicate with others  role-play more effective ways to communicate with others.    Objective #C  Patient will learn and demonstrate 3 assertiveness skill(s).  Status: Continued - Date(s): 10/14/2020     Intervention(s)  Therapist will assign homework to use assertive communication  role-play assertiveness skills.      Goal 3: Patient will find a hobby or interest    I will know I've met my goal when I go back to school and feel like I have things that are meaningful.      Objective #A (Patient Action)    Status: Completed - Date: 10/14/2020     Patient will Identify what he would like to do and steps that would get him there.    Intervention(s)  Therapist will assign homework to decide on steps to get towards goals  teach the client how to complete a 4-part pros and cons. to help with decision making.    Objective #B  Patient will identify barriers that keep him from moving forward with his goals.    Status: Continued - Date(s): 10/14/2020     Intervention(s)  Therapist will assign homework to note barriers   teach problem-sovling model.    Objective #C  Patient will engage in problem solving to help him move forward with his interst.  Status: Continued - Date(s): 10/14/2020     Intervention(s)  Therapist will assign homework to carry out possible solutions  provide support to gauge how problem-solving is going and other ways to look at issues.      Patient has reviewed and agreed to the above plan.      Rossy Barnes, [facility]  October 14, 2020

## 2020-10-21 ENCOUNTER — THERAPY VISIT (OUTPATIENT)
Dept: PHYSICAL THERAPY | Facility: CLINIC | Age: 50
End: 2020-10-21
Payer: COMMERCIAL

## 2020-10-21 ENCOUNTER — VIRTUAL VISIT (OUTPATIENT)
Dept: BEHAVIORAL HEALTH | Facility: CLINIC | Age: 50
End: 2020-10-21
Payer: COMMERCIAL

## 2020-10-21 DIAGNOSIS — M25.511 BILATERAL SHOULDER PAIN: ICD-10-CM

## 2020-10-21 DIAGNOSIS — M25.512 BILATERAL SHOULDER PAIN: ICD-10-CM

## 2020-10-21 DIAGNOSIS — F10.10 ALCOHOL USE DISORDER, MILD, ABUSE: Primary | ICD-10-CM

## 2020-10-21 PROCEDURE — 97140 MANUAL THERAPY 1/> REGIONS: CPT | Mod: GP | Performed by: PHYSICAL THERAPIST

## 2020-10-21 PROCEDURE — 90834 PSYTX W PT 45 MINUTES: CPT | Mod: GT | Performed by: SOCIAL WORKER

## 2020-10-21 PROCEDURE — 97110 THERAPEUTIC EXERCISES: CPT | Mod: GP | Performed by: PHYSICAL THERAPIST

## 2020-10-21 NOTE — PROGRESS NOTES
"Subjective:  HPI  Physical Exam                    Objective:  System    Physical Exam    General     ROS    Assessment/Plan:    PROGRESS  REPORT    Progress reporting period is from 9-2-20 to 10-21-20, 8 visits.       SUBJECTIVE  Patient initially c/o constant pain/ \"ache\" left upper arm with intermittent \"sharp\" pain ranging 1-10/10 and describes feeling of \"heaviness\" left upper arm with lifting the arm and intermittent \"twitching\" left upper arm.  Right shoulder pain was intermittent 0-8/10, described as \"sharp\".  Left shoulder pain increased/had difficulty with lifting the arm overhead to the front, side and behind, (difficulty dressing and getting belt on), unable to use left arm to hook seatbelt and pass credit card out of -side window.  No pain/issue with lying on the left side, but pain left arm with lying on the right side.   Right shoulder pain increased with reaching behind and needing to carefully position right arm when lying on the left.   .           Today patient reports continued improvement.  He denies pain left shoulder or left UE, ? minimal intermittent \"heaviness\".  Intermittent pain right shoulder, with certain movements, worst with reaching behind - 0-7/10.  Still significant restriction of motion, left >right shoulder.  No issues with lying on his sides.       Current Pain level: 0/10(at rest).     Initial Pain level: (1-10/10).   Changes in function:  Yes (See Goal flowsheet attached for changes in current functional level)  Adverse reaction to treatment or activity: None    OBJECTIVE  AROM cervical extension 90%, left rotation 90%, right rotation WNL - no pain at end ranges.      AROM right shoulder flexion 131, abduction 152, ext/IR T12.      AROM/PROM left shoulder flexion 84/105, abduction 83/96, ER 5 from neutral at 45 degrees abduction/0 at 45 degrees abduction, ext/IR top of iliac crest posterior/not tested.       ASSESSMENT/PLAN  Updated problem list and treatment plan: " Diagnosis 1:  Bilateral shoulder pain    Pain -  self management, education and home program  Decreased ROM/flexibility - manual therapy, therapeutic exercise and home program  Decreased strength - therapeutic exercise, therapeutic activities and home program  Decreased function - therapeutic activities and home program  STG/LTGs have been met or progress has been made towards goals:  Yes (See Goal flow sheet completed today.)  Assessment of Progress: The patient's condition is improving.  The patient's condition has potential to improve.  Self Management Plans:  Patient has been instructed in a home treatment program.  I have re-evaluated this patient and find that the nature, scope, duration and intensity of the therapy is appropriate for the medical condition of the patient.  Nick continues to require the following intervention to meet STG and LTG's:  PT    Recommendations:  This patient would benefit from continued therapy.     Frequency:  1 X week, once daily  Duration:  for 6 weeks        Please refer to the daily flowsheet for treatment today, total treatment time and time spent performing 1:1 timed codes.

## 2020-10-21 NOTE — PROGRESS NOTES
Progress Note    Patient Name: Nick Montiel  Date: 10/21/2020         Service Type: Individual      Session Start Time: 9:30  Session End Time: 10:22     Session Length: 52    Session #: 15    Attendees: Client attended alone    Service Modality:  Video Visit:    Telemedicine Visit: The patient's condition can be safely assessed and treated via synchronous audio and visual telemedicine encounter.      Reason for Telemedicine Visit: Services only offered telehealth    Originating Site (Patient Location): Patient's home    Distant Site (Provider Location): Provider Remote Setting    Consent:  The patient/guardian has verbally consented to: the potential risks and benefits of telemedicine (video visit) versus in person care; bill my insurance or make self-payment for services provided; and responsibility for payment of non-covered services.     Patient would like the video invitation sent by: Send to e-mail at: [email or web address]     Mode of Communication:  Video Conference via Amwell    As the provider I attest to compliance with applicable laws and regulations related to telemedicine.     Treatment Plan Last Reviewed: 10/14/2020    DATA  Interactive Complexity: No  Crisis: No       Progress Since Last Session (Related to Symptoms / Goals / Homework):   Symptoms: Patient reports stressors and increased irritability that occured when working at his job and he has been having difficulties with new apartment.    Homework: Partially completed      Episode of Care Goals: Achieved / completed to satisfaction - PREPARATION (Decided to change - considering how); Intervened by negotiating a change plan and determining options / strategies for behavior change, identifying triggers, exploring social supports, and working towards setting a date to begin behavior change     Current / Ongoing Stressors and Concerns:   Patient reports stress with losing his new job that he  "and his  have bed bugs in there apartment.     Treatment Objective(s) Addressed in This Session:   Identify 2-3 ways to address stressors that lead to angry responses       Intervention:   Solution Focused: Discussed with patient how he is managing stress with attempting to eat regularly and starting exercise.  Patient made plan to make appointment with doctor and go to the gym starting tomorrow.     CBT: Reflected with patient on how at job he was using \"should\" statements that were creating more irritability.  Discussed with patient noting his internal self talk as a way to address irritability and anger before he speaks with  and makes decisions.          ASSESSMENT: Current Emotional / Mental Status (status of significant symptoms):   Risk status (Self / Other harm or suicidal ideation)   Patient denies current fears or concerns for personal safety.   Patient denies current or recent suicidal ideation or behaviors.   Patient denies current or recent homicidal ideation or behaviors.   Patient denies current or recent self injurious behavior or ideation.   Patient denies other safety concerns.   Patient reports there has been no change in risk factors since their last session.     Patient reports there has been no change in protective factors since their last session.     Recommended that patient call 911 or go to the local ED should there be a change in any of these risk factors.     Appearance:   Appropriate    Eye Contact:   Good    Psychomotor Behavior: Normal  Restless    Attitude:   Cooperative  Interested Playful Evasive   Orientation:   All   Speech    Rate / Production: Normal/ Responsive    Volume:  Normal    Mood:    Stressed   Affect:    Appropriate    Thought Content:  Clear    Thought Form:  Coherent  Logical    Insight:    Fair      Medication Review:   No current psychiatric medications prescribed     Medication Compliance:   NA     Changes in Health Issues:   None " reported     Chemical Use Review:   Substance Use: Problem use continues with no change since last session, Stage of Change: Contemplation        Tobacco Use: No current tobacco use.      Diagnosis:  1. Alcohol use disorder, mild, abuse        Collateral Reports Completed:   Not Applicable    PLAN: (Patient Tasks / Therapist Tasks / Other)   Patient agreed that he can check in with doctor on physical health reasons, start eating snacks to help with hunger and work on getting back into exercise.  Patient will write down his internal dialogue when he notices himself getting irritable.        Rossy Barnes, LICSW                                                         ______________________________________________________________________    Treatment Plan    Patient's Name: Nick Montiel  YOB: 1970    Date: 10/14/2020    DSM5 Diagnoses: Substance-Related & Addictive Disorders Alcohol Use Disorder   303.90 (F10.20) Moderate continued use  Psychosocial / Contextual Factors:  Individual Factors has had problems with alcohol use his adult lifetime, has stolen items and engaged in high risk sexual behaivors more historically then recently, Family Factors Reports a negative relationship with mom and does not speak with dad.  His current partner is concerend about his alcohol use and his mood swings that cause anger, Community Factors he has experienced homelessness and has been engaged in the [employer] community and has performed drag and Economic Factors currently works as a  and is economically comfortable and has a history of difficulty maintaining employement.  WHODAS:     Referral / Collaboration:  The following referral(s) will be initiated: Group support to limit alcohol [email or web address] or contact Daphne Chen     Anticipated number of session or this episode of care: 10-20      MeasurableTreatment Goal(s) related to diagnosis / functional impairment(s)  Goal 1:  Patient will not give up easily    I will know I've met my goal when I am finishing what I started, and completed goals.      Objective #A (Patient Action)    Patient will Identify negative self-talk and behaviors: challenge core beliefs, myths, and actions.  Status: Continued - Date(s): 10/14/2020     Intervention(s)  Therapist will assign homework to engage in cognitive restructuring  provide support to critically look at how negative beliefs keep you from moving forward .    Objective #B  Patient will identify core beliefs and the times in your life that originally created beliefs.  Status: Continued - Date(s): 10/14/2020     Intervention(s)  Therapist will assign homework to challenge core beliefs  provide educational materials on core beliefs and lifetime patterns  teach how to challenge core beliefs.    Objective #C  Patient will will be working towards his goals .  Status: Continued - Date(s): 10/14/2020     Intervention(s)  Therapist will assign homework to continue to use skills from therapy  provide support to negotiate what works and what patient continues to struggle with.      Goal 2: Patient will not be so negative     I will know I've met my goal when My  would tell me I am nicer, I would be able to define what it means to be a more positive person.      Objective #A (Patient Action)    Status: Continued - Date(s): 10/14/2020     Patient will identify feelings and emotions that occur prior to more negative behaviors.    Intervention(s)  Therapist will assign homework to track patient thoughts and feelings  provide educational materials on cognitive distortions  teach cognitive restructuring.    Objective #B  Patient will identify atleast 3 other responses that I can have other than negative.    Status: Continued - Date(s): 10/14/2020     Intervention(s)  Therapist will assign homework to practice other types of responses  provide educational materials on effective ways to communicate with  others  role-play more effective ways to communicate with others.    Objective #C  Patient will learn and demonstrate 3 assertiveness skill(s).  Status: Continued - Date(s): 10/14/2020     Intervention(s)  Therapist will assign homework to use assertive communication  role-play assertiveness skills.      Goal 3: Patient will find a hobby or interest    I will know I've met my goal when I go back to school and feel like I have things that are meaningful.      Objective #A (Patient Action)    Status: Completed - Date: 10/14/2020     Patient will Identify what he would like to do and steps that would get him there.    Intervention(s)  Therapist will assign homework to decide on steps to get towards goals  teach the client how to complete a 4-part pros and cons. to help with decision making.    Objective #B  Patient will identify barriers that keep him from moving forward with his goals.    Status: Continued - Date(s): 10/14/2020     Intervention(s)  Therapist will assign homework to note barriers   teach problem-sovling model.    Objective #C  Patient will engage in problem solving to help him move forward with his interst.  Status: Continued - Date(s): 10/14/2020     Intervention(s)  Therapist will assign homework to carry out possible solutions  provide support to gauge how problem-solving is going and other ways to look at issues.      Patient has reviewed and agreed to the above plan.      Rossy Barnes, [facility]  October 14, 2020

## 2020-10-28 ENCOUNTER — THERAPY VISIT (OUTPATIENT)
Dept: PHYSICAL THERAPY | Facility: CLINIC | Age: 50
End: 2020-10-28
Payer: COMMERCIAL

## 2020-10-28 ENCOUNTER — VIRTUAL VISIT (OUTPATIENT)
Dept: BEHAVIORAL HEALTH | Facility: CLINIC | Age: 50
End: 2020-10-28
Payer: COMMERCIAL

## 2020-10-28 DIAGNOSIS — M25.512 BILATERAL SHOULDER PAIN: ICD-10-CM

## 2020-10-28 DIAGNOSIS — Z63.9 RELATIONSHIP PROBLEMS: ICD-10-CM

## 2020-10-28 DIAGNOSIS — M25.511 BILATERAL SHOULDER PAIN: ICD-10-CM

## 2020-10-28 DIAGNOSIS — F10.10 ALCOHOL USE DISORDER, MILD, ABUSE: Primary | ICD-10-CM

## 2020-10-28 PROCEDURE — 97110 THERAPEUTIC EXERCISES: CPT | Mod: GP | Performed by: PHYSICAL THERAPIST

## 2020-10-28 PROCEDURE — 97140 MANUAL THERAPY 1/> REGIONS: CPT | Mod: GP | Performed by: PHYSICAL THERAPIST

## 2020-10-28 PROCEDURE — 90834 PSYTX W PT 45 MINUTES: CPT | Performed by: SOCIAL WORKER

## 2020-10-28 SDOH — SOCIAL STABILITY - SOCIAL INSECURITY: PROBLEM RELATED TO PRIMARY SUPPORT GROUP, UNSPECIFIED: Z63.9

## 2020-10-28 NOTE — PROGRESS NOTES
Progress Note    Patient Name: Nick Montiel  Date: 10/28/2020         Service Type: Individual      Session Start Time: 9:30  Session End Time: 10:22     Session Length: 52    Session #: 16    Attendees: Client attended alone    Service Modality:  Video Visit:    Telemedicine Visit: The patient's condition can be safely assessed and treated via synchronous audio and visual telemedicine encounter.      Reason for Telemedicine Visit: Services only offered telehealth    Originating Site (Patient Location): Patient's home    Distant Site (Provider Location): Provider Remote Setting    Consent:  The patient/guardian has verbally consented to: the potential risks and benefits of telemedicine (video visit) versus in person care; bill my insurance or make self-payment for services provided; and responsibility for payment of non-covered services.     Patient would like the video invitation sent by: Send to e-mail at: [email or web address]     Mode of Communication:  Video Conference via [employer]    As the provider I attest to compliance with applicable laws and regulations related to telemedicine.     Treatment Plan Last Reviewed: 10/14/2020    DATA  Interactive Complexity: No  Crisis: No       Progress Since Last Session (Related to Symptoms / Goals / Homework):   Symptoms: Worsening patient reports that increased stressors have contributed to unhappiness in his relationship.    Homework: Partially completed      Episode of Care Goals: Achieved / completed to satisfaction - PREPARATION (Decided to change - considering how); Intervened by negotiating a change plan and determining options / strategies for behavior change, identifying triggers, exploring social supports, and working towards setting a date to begin behavior change     Current / Ongoing Stressors and Concerns:   Patient reports stress with losing his new job that he and his  have bed bugs in there  "apartment.  Patient reports that stressors have led to more unhappiness in his relationship with .  Patient feeling that no matter what he does he cannot make  happy.  Patient reports feeling irritable and easily snapping when with his  and that this behavior contributes to relationship problems.  Patient notes being concerned about his partner's needs but is not able to articulate own needs in relationship.     Treatment Objective(s) Addressed in This Session:   Patient will identify 3-5 needs that he has in his relationship        Intervention:   CBT: Worked with patient to identify thoughts and feelings related to what began feeling that he is \"done\" in his relationship.  Patient and writer explored past thoughts related to anger and current stressors.  Patient reports continuing to love his  and not being sure that he is ready for the end of the relationship.  Patient agreed to reflect on what is wrong in his relationship to help him reflect on needs in the relationship that he has to help with reducing irritability.        ASSESSMENT: Current Emotional / Mental Status (status of significant symptoms):   Risk status (Self / Other harm or suicidal ideation)   Patient denies current fears or concerns for personal safety.   Patient denies current or recent suicidal ideation or behaviors.   Patient denies current or recent homicidal ideation or behaviors.   Patient denies current or recent self injurious behavior or ideation.   Patient denies other safety concerns.   Patient reports there has been no change in risk factors since their last session.     Patient reports there has been no change in protective factors since their last session.     Recommended that patient call 911 or go to the local ED should there be a change in any of these risk factors.     Appearance:   Appropriate    Eye Contact:   Good    Psychomotor Behavior: Retarded (Slowed)    Attitude:   Cooperative  " Interested   Orientation:   All   Speech    Rate / Production: Slow     Volume:  Soft    Mood:    Sad    Affect:    Subdued    Thought Content:  Clear  Rumination    Thought Form:  Coherent  Logical  Circumstantial   Insight:    Fair      Medication Review:   No current psychiatric medications prescribed     Medication Compliance:   NA     Changes in Health Issues:   None reported     Chemical Use Review:   Substance Use: Problem use continues with no change since last session, Stage of Change: Contemplation        Tobacco Use: No current tobacco use.      Diagnosis:  1. Alcohol use disorder, mild, abuse    2. Relationship problems        Collateral Reports Completed:   Not Applicable    PLAN: (Patient Tasks / Therapist Tasks / Other)   Patient agreed that he can check in with doctor on physical health reasons, start eating snacks to help with hunger and work on getting back into exercise.  Patient will write down the needs that he has in his relationship        LETICIA Naqvi                                                         ______________________________________________________________________    Treatment Plan    Patient's Name: Nick Montiel  YOB: 1970    Date: 10/14/2020    DSM5 Diagnoses: Substance-Related & Addictive Disorders Alcohol Use Disorder   303.90 (F10.20) Moderate continued use  Psychosocial / Contextual Factors:  Individual Factors has had problems with alcohol use his adult lifetime, has stolen items and engaged in high risk sexual behaivors more historically then recently, Family Factors Reports a negative relationship with mom and does not speak with dad.  His current partner is concerend about his alcohol use and his mood swings that cause anger, Community Factors he has experienced homelessness and has been engaged in the [employer] community and has performed drag and Economic Factors currently works as a  and is economically comfortable  and has a history of difficulty maintaining employement.  WHODAS:     Referral / Collaboration:  The following referral(s) will be initiated: Group support to limit alcohol [email or web address] or contact Daphne Chen     Anticipated number of session or this episode of care: 10-20      MeasurableTreatment Goal(s) related to diagnosis / functional impairment(s)  Goal 1: Patient will not give up easily    I will know I've met my goal when I am finishing what I started, and completed goals.      Objective #A (Patient Action)    Patient will Identify negative self-talk and behaviors: challenge core beliefs, myths, and actions.  Status: Continued - Date(s): 10/14/2020     Intervention(s)  Therapist will assign homework to engage in cognitive restructuring  provide support to critically look at how negative beliefs keep you from moving forward .    Objective #B  Patient will identify core beliefs and the times in your life that originally created beliefs.  Status: Continued - Date(s): 10/14/2020     Intervention(s)  Therapist will assign homework to challenge core beliefs  provide educational materials on core beliefs and lifetime patterns  teach how to challenge core beliefs.    Objective #C  Patient will will be working towards his goals .  Status: Continued - Date(s): 10/14/2020     Intervention(s)  Therapist will assign homework to continue to use skills from therapy  provide support to negotiate what works and what patient continues to struggle with.      Goal 2: Patient will not be so negative     I will know I've met my goal when My  would tell me I am nicer, I would be able to define what it means to be a more positive person.      Objective #A (Patient Action)    Status: Continued - Date(s): 10/14/2020     Patient will identify feelings and emotions that occur prior to more negative behaviors.    Intervention(s)  Therapist will assign homework to track patient thoughts and feelings  provide educational  materials on cognitive distortions  teach cognitive restructuring.    Objective #B  Patient will identify atleast 3 other responses that I can have other than negative.    Status: Continued - Date(s): 10/14/2020     Intervention(s)  Therapist will assign homework to practice other types of responses  provide educational materials on effective ways to communicate with others  role-play more effective ways to communicate with others.    Objective #C  Patient will learn and demonstrate 3 assertiveness skill(s).  Status: Continued - Date(s): 10/14/2020     Intervention(s)  Therapist will assign homework to use assertive communication  role-play assertiveness skills.      Goal 3: Patient will find a hobby or interest    I will know I've met my goal when I go back to school and feel like I have things that are meaningful.      Objective #A (Patient Action)    Status: Completed - Date: 10/14/2020     Patient will Identify what he would like to do and steps that would get him there.    Intervention(s)  Therapist will assign homework to decide on steps to get towards goals  teach the client how to complete a 4-part pros and cons. to help with decision making.    Objective #B  Patient will identify barriers that keep him from moving forward with his goals.    Status: Continued - Date(s): 10/14/2020     Intervention(s)  Therapist will assign homework to note barriers   teach problem-sovling model.    Objective #C  Patient will engage in problem solving to help him move forward with his interst.  Status: Continued - Date(s): 10/14/2020     Intervention(s)  Therapist will assign homework to carry out possible solutions  provide support to gauge how problem-solving is going and other ways to look at issues.      Patient has reviewed and agreed to the above plan.      Rossy Barnes, [facility]  October 14, 2020

## 2020-11-04 ENCOUNTER — THERAPY VISIT (OUTPATIENT)
Dept: PHYSICAL THERAPY | Facility: CLINIC | Age: 50
End: 2020-11-04
Payer: COMMERCIAL

## 2020-11-04 DIAGNOSIS — M25.512 BILATERAL SHOULDER PAIN: ICD-10-CM

## 2020-11-04 DIAGNOSIS — M25.511 BILATERAL SHOULDER PAIN: ICD-10-CM

## 2020-11-04 PROCEDURE — 97140 MANUAL THERAPY 1/> REGIONS: CPT | Mod: GP | Performed by: PHYSICAL THERAPIST

## 2020-11-04 PROCEDURE — 97110 THERAPEUTIC EXERCISES: CPT | Mod: GP | Performed by: PHYSICAL THERAPIST

## 2020-11-09 ENCOUNTER — OFFICE VISIT (OUTPATIENT)
Dept: FAMILY MEDICINE | Facility: CLINIC | Age: 50
End: 2020-11-09
Payer: COMMERCIAL

## 2020-11-09 ENCOUNTER — ANCILLARY PROCEDURE (OUTPATIENT)
Dept: GENERAL RADIOLOGY | Facility: CLINIC | Age: 50
End: 2020-11-09
Attending: INTERNAL MEDICINE
Payer: COMMERCIAL

## 2020-11-09 VITALS
HEIGHT: 67 IN | SYSTOLIC BLOOD PRESSURE: 128 MMHG | DIASTOLIC BLOOD PRESSURE: 72 MMHG | OXYGEN SATURATION: 95 % | WEIGHT: 171 LBS | HEART RATE: 76 BPM | BODY MASS INDEX: 26.84 KG/M2 | TEMPERATURE: 97.4 F

## 2020-11-09 DIAGNOSIS — E78.00 HYPERCHOLESTEROLEMIA: ICD-10-CM

## 2020-11-09 DIAGNOSIS — R20.0 NUMBNESS AND TINGLING IN LEFT ARM: ICD-10-CM

## 2020-11-09 DIAGNOSIS — R20.2 NUMBNESS AND TINGLING IN LEFT ARM: ICD-10-CM

## 2020-11-09 DIAGNOSIS — Z76.89 ENCOUNTER TO ESTABLISH CARE WITH NEW DOCTOR: Primary | ICD-10-CM

## 2020-11-09 DIAGNOSIS — R73.01 ELEVATED FASTING BLOOD SUGAR: ICD-10-CM

## 2020-11-09 DIAGNOSIS — R55 SYNCOPE, UNSPECIFIED SYNCOPE TYPE: ICD-10-CM

## 2020-11-09 DIAGNOSIS — F10.10 ALCOHOL USE DISORDER, MILD, ABUSE: ICD-10-CM

## 2020-11-09 LAB
ALBUMIN SERPL-MCNC: 4.1 G/DL (ref 3.4–5)
ALP SERPL-CCNC: 88 U/L (ref 40–150)
ALT SERPL W P-5'-P-CCNC: 32 U/L (ref 0–70)
ANION GAP SERPL CALCULATED.3IONS-SCNC: 3 MMOL/L (ref 3–14)
AST SERPL W P-5'-P-CCNC: 18 U/L (ref 0–45)
BILIRUB SERPL-MCNC: 0.5 MG/DL (ref 0.2–1.3)
BUN SERPL-MCNC: 20 MG/DL (ref 7–30)
CALCIUM SERPL-MCNC: 9 MG/DL (ref 8.5–10.1)
CHLORIDE SERPL-SCNC: 109 MMOL/L (ref 94–109)
CHOLEST SERPL-MCNC: 248 MG/DL
CO2 SERPL-SCNC: 25 MMOL/L (ref 20–32)
CREAT SERPL-MCNC: 0.85 MG/DL (ref 0.66–1.25)
GFR SERPL CREATININE-BSD FRML MDRD: >90 ML/MIN/{1.73_M2}
GLUCOSE SERPL-MCNC: 104 MG/DL (ref 70–99)
HBA1C MFR BLD: 5.7 % (ref 0–5.6)
HDLC SERPL-MCNC: 39 MG/DL
LDLC SERPL CALC-MCNC: 193 MG/DL
NONHDLC SERPL-MCNC: 209 MG/DL
POTASSIUM SERPL-SCNC: 4.4 MMOL/L (ref 3.4–5.3)
PROT SERPL-MCNC: 7.3 G/DL (ref 6.8–8.8)
SODIUM SERPL-SCNC: 137 MMOL/L (ref 133–144)
TRIGL SERPL-MCNC: 79 MG/DL

## 2020-11-09 PROCEDURE — 99214 OFFICE O/P EST MOD 30 MIN: CPT | Performed by: INTERNAL MEDICINE

## 2020-11-09 PROCEDURE — 83036 HEMOGLOBIN GLYCOSYLATED A1C: CPT | Performed by: INTERNAL MEDICINE

## 2020-11-09 PROCEDURE — 36415 COLL VENOUS BLD VENIPUNCTURE: CPT | Performed by: INTERNAL MEDICINE

## 2020-11-09 PROCEDURE — 80053 COMPREHEN METABOLIC PANEL: CPT | Performed by: INTERNAL MEDICINE

## 2020-11-09 PROCEDURE — 72040 X-RAY EXAM NECK SPINE 2-3 VW: CPT | Mod: FY | Performed by: RADIOLOGY

## 2020-11-09 PROCEDURE — 80061 LIPID PANEL: CPT | Performed by: INTERNAL MEDICINE

## 2020-11-09 RX ORDER — IBUPROFEN 200 MG
200 TABLET ORAL EVERY 4 HOURS PRN
COMMUNITY

## 2020-11-09 RX ORDER — NAPROXEN SODIUM 220 MG
220 TABLET ORAL 2 TIMES DAILY WITH MEALS
COMMUNITY

## 2020-11-09 ASSESSMENT — MIFFLIN-ST. JEOR: SCORE: 1594.28

## 2020-11-09 NOTE — PROGRESS NOTES
Subjective     Nick Montiel is a 50 year old male who presents to clinic today for the following health issues:    JAMISON Romero is here to establish care and has a few concerns today.  He was following with Dr. Roberson at [facility] is no longer at that clinic.  He and his  recently moved from [place] to an apartment in [place], very close to our clinic.  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    1.  Concern about neck and shoulder pain.  He was started on atorvastatin in June for elevated LDL.  He developed upper arm and shoulder aches after starting this medication, so subsequently stopped it.  The shoulder pain did not get any better.  He has been diagnosed with frozen shoulder on the left, also has shoulder pain on the right.  He is seeing a physical therapist for this.  Intermittently he has had a tingling down the ulnar side of his left arm as well.  Physical therapist brought up whether there may be some cervical spine issues contributing as well.  He is considering going to a chiropractor for the neck.      2.  About low blood sugar.  He is always been irritable and crabby when he is hungry.  Wondering if this might be due to low blood sugar.  The other day while he was hiking his , so arms a little bit above his shoulders, he very briefly passed out.  There were no preceding symptoms.  He has not had any other episodes of passing out.  He has a family history of diabetes in maternal aunt and maternal grandmother.    3.  He has asthma which is been well controlled with montelukast 10 mg.  He only needs to use albuterol about once a month.    4.  Alcohol use -admittedly drinks too much alcohol.  He is working with counselor regarding this as well as some other mental health issues.  He would like to check his liver tests today.    Review of Systems   Const, cv, pulm, msk, neuro, endo reviewed,  otherwise negative unless noted above.        Objective    /72    "Pulse 76   Temp 97.4  F (36.3  C) (Tympanic)   Ht 1.702 m (5' 7\")   Wt 77.6 kg (171 lb)   SpO2 95%   BMI 26.78 kg/m    Body mass index is 26.78 kg/m .  Physical Exam   Gen: well appearing, pleasant man, no distress  Pulm: breathing comfortably, CTAB, no wheezes or rales  CV: RRR, normal S1 and S2, no murmurs  MSK: no cervical spine tenderness.  Mild limitation in neck ROM.   Ext: 2+ distal pulses, no LE edema      Xray -   IMPRESSION: There is normal alignment of the cervical vertebrae;  however, there is straightening of normal cervical lordosis. Vertebral  body heights of the cervical spine are normal. Craniocervical  alignment is normal. There is no evidence for fracture of the cervical  spine. There is mild-moderate facet arthropathy throughout the  cervical spine. No significant degenerative disc disease noted. No  prevertebral soft tissue swelling.        Assessment & Plan     Encounter to establish care with new doctor  Transferring care to [facility]     Syncope, unspecified syncope type  The episode of very brief syncope with arms slightly raised is concerning.  There were no preceeding symptoms. It has not happened any other times.  Discussed that this is very unlikely due to low blood sugar - that is a rare condition outside of patients that are on blood sugar lowering medications, which he is not on.  Vascular issue seems unlikely.  Will continue to monitor    Elevated fasting blood sugar  Will check a1c and glucose today.   - Hemoglobin A1c    Numbness and tingling in left arm  No obvious disc disease on the xray.  The shoulder pain is most bothersome at this point.  At this point his numbness is intermittent and not too bothersome.  Will continue to do PT of the neck and shoulders.  Consider MRI if symptoms progress at some point in the future.   - XR Cervical Spine 2/3 Views; Future    Alcohol use disorder, mild, abuse  - Comprehensive metabolic panel (BMP + Alb, Alk Phos, ALT, AST, " Total. Bili, TP)    Hypercholesterolemia  - Lipid panel reflex to direct LDL Fasting         Return in about 6 months (around 5/9/2021) for Physical Exam.    Babs Dumas MD  [facility]

## 2020-11-11 ENCOUNTER — VIRTUAL VISIT (OUTPATIENT)
Dept: BEHAVIORAL HEALTH | Facility: CLINIC | Age: 50
End: 2020-11-11
Payer: COMMERCIAL

## 2020-11-11 ENCOUNTER — THERAPY VISIT (OUTPATIENT)
Dept: PHYSICAL THERAPY | Facility: CLINIC | Age: 50
End: 2020-11-11
Payer: COMMERCIAL

## 2020-11-11 DIAGNOSIS — M25.512 BILATERAL SHOULDER PAIN: ICD-10-CM

## 2020-11-11 DIAGNOSIS — F10.10 ALCOHOL USE DISORDER, MILD, ABUSE: Primary | ICD-10-CM

## 2020-11-11 DIAGNOSIS — M25.511 BILATERAL SHOULDER PAIN: ICD-10-CM

## 2020-11-11 PROCEDURE — 97110 THERAPEUTIC EXERCISES: CPT | Mod: GP | Performed by: PHYSICAL THERAPIST

## 2020-11-11 PROCEDURE — 97140 MANUAL THERAPY 1/> REGIONS: CPT | Mod: GP | Performed by: PHYSICAL THERAPIST

## 2020-11-11 PROCEDURE — 90834 PSYTX W PT 45 MINUTES: CPT | Mod: GT | Performed by: SOCIAL WORKER

## 2020-11-11 NOTE — PROGRESS NOTES
Progress Note    Patient Name: Nick Montiel  Date: 11/11/2020         Service Type: Individual      Session Start Time: 14:00  Session End Time: 14:52     Session Length: 52    Session #: 17    Attendees: Client attended alone    Service Modality:  Video Visit:    Telemedicine Visit: The patient's condition can be safely assessed and treated via synchronous audio and visual telemedicine encounter.      Reason for Telemedicine Visit: Services only offered telehealth    Originating Site (Patient Location): Patient's home    Distant Site (Provider Location): Provider Remote Setting    Consent:  The patient/guardian has verbally consented to: the potential risks and benefits of telemedicine (video visit) versus in person care; bill my insurance or make self-payment for services provided; and responsibility for payment of non-covered services.     Patient would like the video invitation sent by: Send to e-mail at: [email or web address]     Mode of Communication:  Video Conference via [employer]    As the provider I attest to compliance with applicable laws and regulations related to telemedicine.     Treatment Plan Last Reviewed: 10/14/2020    DATA  Interactive Complexity: No  Crisis: No       Progress Since Last Session (Related to Symptoms / Goals / Homework):   Symptoms: Improving Patient was able to resolve some of his stressors and was more future focused in session.    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  Patient reports that stressors have led to more unhappiness in his relationship with .  Patient feeling that no matter what he  does he cannot make  happy.  Patient reports feeling irritable and easily snapping when with his  and that this behavior contributes to relationship problems.  Patient notes being concerned about his partner's needs but is not able to articulate own needs in relationship.     Treatment Objective(s) Addressed in This Session:   identify 3-4 strategies to more effectively address stressors     Intervention:   Solution Focused: Patient reported that he was able to tell his  about his concerns and had a conversation about what his 's needs in the relationship.  Patient was able to learn that he could let some concerns go. Writer asked patient about the idea of letting go of the responsibility of his  being happy.     CBT: Patient discussed on-going stressor of frozen shoulders.  Patient notes that he has been successful in following up on health issues.  He states that working out is not very possible right now.  He agreed to try breathing as a way to manage stress.  Patient agreed to learn relaxed breathing and stated he could try this out daily over the next week.         ASSESSMENT: Current Emotional / Mental Status (status of significant symptoms):   Risk status (Self / Other harm or suicidal ideation)   Patient denies current fears or concerns for personal safety.   Patient denies current or recent suicidal ideation or behaviors.   Patient denies current or recent homicidal ideation or behaviors.   Patient denies current or recent self injurious behavior or ideation.   Patient denies other safety concerns.   Patient reports there has been no change in risk factors since their last session.     Patient reports there has been no change in protective factors since their last session.     Recommended that patient call 911 or go to the local ED should there be a change in any of these risk factors.     Appearance:   Appropriate    Eye Contact:   Good    Psychomotor Behavior: Agitated -  due to pain   Attitude:   Cooperative  Interested   Orientation:   All   Speech    Rate / Production: Normal/ Responsive    Volume:  Normal    Mood:    Irritable    Affect:    Subdued    Thought Content:  Clear    Thought Form:  Coherent  Logical  Circumstantial   Insight:    Fair      Medication Review:   No current psychiatric medications prescribed     Medication Compliance:   NA     Changes in Health Issues:   None reported     Chemical Use Review:   Substance Use: Problem use continues with no change since last session, Stage of Change: Contemplation        Tobacco Use: No current tobacco use.      Diagnosis:  1. Alcohol use disorder, mild, abuse        Collateral Reports Completed:   Not Applicable    PLAN: (Patient Tasks / Therapist Tasks / Other)   Patient will practice relaxed breathing daily        Rossy Barnes, LICSW                                                         ______________________________________________________________________    Treatment Plan    Patient's Name: Nick Montiel  YOB: 1970    Date: 10/14/2020    DSM5 Diagnoses: Substance-Related & Addictive Disorders Alcohol Use Disorder   303.90 (F10.20) Moderate continued use  Psychosocial / Contextual Factors:  Individual Factors has had problems with alcohol use his adult lifetime, has stolen items and engaged in high risk sexual behaivors more historically then recently, Family Factors Reports a negative relationship with mom and does not speak with dad.  His current partner is concerend about his alcohol use and his mood swings that cause anger, Community Factors he has experienced homelessness and has been engaged in the [email or web address] community and has performed drag and Economic Factors currently works as a  and is economically comfortable and has a history of difficulty maintaining employement.  WHODAS:     Referral / Collaboration:  The following referral(s) will be initiated: Group support to  limit alcohol [email or web address] or contact [facility]     Anticipated number of session or this episode of care: 10-20      MeasurableTreatment Goal(s) related to diagnosis / functional impairment(s)  Goal 1: Patient will not give up easily    I will know I've met my goal when I am finishing what I started, and completed goals.      Objective #A (Patient Action)    Patient will Identify negative self-talk and behaviors: challenge core beliefs, myths, and actions.  Status: Continued - Date(s): 10/14/2020     Intervention(s)  Therapist will assign homework to engage in cognitive restructuring  provide support to critically look at how negative beliefs keep you from moving forward .    Objective #B  Patient will identify core beliefs and the times in your life that originally created beliefs.  Status: Continued - Date(s): 10/14/2020     Intervention(s)  Therapist will assign homework to challenge core beliefs  provide educational materials on core beliefs and lifetime patterns  teach how to challenge core beliefs.    Objective #C  Patient will will be working towards his goals .  Status: Continued - Date(s): 10/14/2020     Intervention(s)  Therapist will assign homework to continue to use skills from therapy  provide support to negotiate what works and what patient continues to struggle with.      Goal 2: Patient will not be so negative     I will know I've met my goal when My  would tell me I am nicer, I would be able to define what it means to be a more positive person.      Objective #A (Patient Action)    Status: Continued - Date(s): 10/14/2020     Patient will identify feelings and emotions that occur prior to more negative behaviors.    Intervention(s)  Therapist will assign homework to track patient thoughts and feelings  provide educational materials on cognitive distortions  teach cognitive restructuring.    Objective #B  Patient will identify atleast 3 other responses that I can have other than  negative.    Status: Continued - Date(s): 10/14/2020     Intervention(s)  Therapist will assign homework to practice other types of responses  provide educational materials on effective ways to communicate with others  role-play more effective ways to communicate with others.    Objective #C  Patient will learn and demonstrate 3 assertiveness skill(s).  Status: Continued - Date(s): 10/14/2020     Intervention(s)  Therapist will assign homework to use assertive communication  role-play assertiveness skills.      Goal 3: Patient will find a hobby or interest    I will know I've met my goal when I go back to school and feel like I have things that are meaningful.      Objective #A (Patient Action)    Status: Completed - Date: 10/14/2020     Patient will Identify what he would like to do and steps that would get him there.    Intervention(s)  Therapist will assign homework to decide on steps to get towards goals  teach the client how to complete a 4-part pros and cons. to help with decision making.    Objective #B  Patient will identify barriers that keep him from moving forward with his goals.    Status: Continued - Date(s): 10/14/2020     Intervention(s)  Therapist will assign homework to note barriers   teach problem-sovling model.    Objective #C  Patient will engage in problem solving to help him move forward with his interst.  Status: Continued - Date(s): 10/14/2020     Intervention(s)  Therapist will assign homework to carry out possible solutions  provide support to gauge how problem-solving is going and other ways to look at issues.      Patient has reviewed and agreed to the above plan.      Rossy Barnes, [facility]  October 14, 2020

## 2020-11-18 ENCOUNTER — THERAPY VISIT (OUTPATIENT)
Dept: PHYSICAL THERAPY | Facility: CLINIC | Age: 50
End: 2020-11-18
Payer: COMMERCIAL

## 2020-11-18 ENCOUNTER — MYC MEDICAL ADVICE (OUTPATIENT)
Dept: FAMILY MEDICINE | Facility: CLINIC | Age: 50
End: 2020-11-18

## 2020-11-18 ENCOUNTER — VIRTUAL VISIT (OUTPATIENT)
Dept: BEHAVIORAL HEALTH | Facility: CLINIC | Age: 50
End: 2020-11-18
Payer: COMMERCIAL

## 2020-11-18 DIAGNOSIS — M25.512 BILATERAL SHOULDER PAIN: ICD-10-CM

## 2020-11-18 DIAGNOSIS — F10.10 ALCOHOL USE DISORDER, MILD, ABUSE: Primary | ICD-10-CM

## 2020-11-18 DIAGNOSIS — M25.511 BILATERAL SHOULDER PAIN: ICD-10-CM

## 2020-11-18 DIAGNOSIS — E78.00 HYPERCHOLESTEROLEMIA: Primary | ICD-10-CM

## 2020-11-18 PROCEDURE — 90834 PSYTX W PT 45 MINUTES: CPT | Mod: GT | Performed by: SOCIAL WORKER

## 2020-11-18 PROCEDURE — 97110 THERAPEUTIC EXERCISES: CPT | Mod: GP | Performed by: PHYSICAL THERAPIST

## 2020-11-18 PROCEDURE — 97140 MANUAL THERAPY 1/> REGIONS: CPT | Mod: GP | Performed by: PHYSICAL THERAPIST

## 2020-11-18 NOTE — PROGRESS NOTES
Progress Note    Patient Name: Nick Montiel  Date: 11/18/2020         Service Type: Individual      Session Start Time: 16:30  Session End Time: 17:22     Session Length: 52    Session #: 18    Attendees: Client attended alone    Service Modality:  Video Visit:    Telemedicine Visit: The patient's condition can be safely assessed and treated via synchronous audio and visual telemedicine encounter.      Reason for Telemedicine Visit: Services only offered telehealth    Originating Site (Patient Location): Patient's home    Distant Site (Provider Location): Provider Remote Setting    Consent:  The patient/guardian has verbally consented to: the potential risks and benefits of telemedicine (video visit) versus in person care; bill my insurance or make self-payment for services provided; and responsibility for payment of non-covered services.     Patient would like the video invitation sent by: Send to e-mail at: [email or web address]     Mode of Communication:  Video Conference via Amwell    As the provider I attest to compliance with applicable laws and regulations related to telemedicine.     Treatment Plan Last Reviewed: 10/14/2020    DATA  Interactive Complexity: No  Crisis: No       Progress Since Last Session (Related to Symptoms / Goals / Homework):   Symptoms: Improving Patient is working on managing stress better and appeared calmer in session    Homework: Achieved / completed to satisfaction      Episode of Care Goals: Achieved / completed to satisfaction - ACTION (Actively working towards change); Intervened by reinforcing change plan / affirming steps taken     Current / Ongoing Stressors and Concerns:   Patient reports being stressed about his health and that he has frozen shoulder in both shoulders.  Patient reports that stressors have led to more unhappiness in his relationship with .  Patient feeling that no matter what he does he cannot  make  happy.  Patient reports feeling irritable and easily snapping when with his  and that this behavior contributes to relationship problems.  Patient notes being concerned about his partner's needs but is not able to articulate own needs in relationship.     Treatment Objective(s) Addressed in This Session:   identify 3-4 strategies to more effectively address stressors     Intervention:   Solution Focused: Patient discussed how he is managing stress better and has been able to use breathing.  He also noted asking his  for what he needs and that he is taking more time off versus working every day.  Patient also identified his plans to move forward with his education.        ASSESSMENT: Current Emotional / Mental Status (status of significant symptoms):   Risk status (Self / Other harm or suicidal ideation)   Patient denies current fears or concerns for personal safety.   Patient denies current or recent suicidal ideation or behaviors.   Patient denies current or recent homicidal ideation or behaviors.   Patient denies current or recent self injurious behavior or ideation.   Patient denies other safety concerns.   Patient reports there has been no change in risk factors since their last session.     Patient reports there has been no change in protective factors since their last session.     Recommended that patient call 911 or go to the local ED should there be a change in any of these risk factors.     Appearance:   Appropriate    Eye Contact:   Good    Psychomotor Behavior: Normal    Attitude:   Cooperative  Interested   Orientation:   All   Speech    Rate / Production: Normal/ Responsive    Volume:  Normal    Mood:    Normal   Affect:    Subdued    Thought Content:  Clear    Thought Form:  Coherent  Logical    Insight:    Good      Medication Review:   No current psychiatric medications prescribed     Medication Compliance:   NA     Changes in Health Issues:   None reported     Chemical Use  Review:   Substance Use: decrease in alcohol .  Patient reports frequency of use only on weekends.  Stage of Change: Action        Tobacco Use: No current tobacco use.      Diagnosis:  1. Alcohol use disorder, mild, abuse        Collateral Reports Completed:   Not Applicable    PLAN: (Patient Tasks / Therapist Tasks / Other)   Patient will practice relaxed breathing daily and continue to manage stress more in life        Rossy LaradixieAdama, LICSW                                                         ______________________________________________________________________    Treatment Plan    Patient's Name: Nick Montiel  YOB: 1970    Date: 10/14/2020    DSM5 Diagnoses: Substance-Related & Addictive Disorders Alcohol Use Disorder   303.90 (F10.20) Moderate continued use  Psychosocial / Contextual Factors:  Individual Factors has had problems with alcohol use his adult lifetime, has stolen items and engaged in high risk sexual behaivors more historically then recently, Family Factors Reports a negative relationship with mom and does not speak with dad.  His current partner is concerend about his alcohol use and his mood swings that cause anger, Community Factors he has experienced homelessness and has been engaged in the [employer] community and has performed drag and Economic Factors currently works as a  and is economically comfortable and has a history of difficulty maintaining employement.  WHODAS:     Referral / Collaboration:  The following referral(s) will be initiated: Group support to limit alcohol [email or web address] or contact [facility]     Anticipated number of session or this episode of care: 10-20      MeasurableTreatment Goal(s) related to diagnosis / functional impairment(s)  Goal 1: Patient will not give up easily    I will know I've met my goal when I am finishing what I started, and completed goals.      Objective #A (Patient Action)    Patient will  Identify negative self-talk and behaviors: challenge core beliefs, myths, and actions.  Status: Continued - Date(s): 10/14/2020     Intervention(s)  Therapist will assign homework to engage in cognitive restructuring  provide support to critically look at how negative beliefs keep you from moving forward .    Objective #B  Patient will identify core beliefs and the times in your life that originally created beliefs.  Status: Continued - Date(s): 10/14/2020     Intervention(s)  Therapist will assign homework to challenge core beliefs  provide educational materials on core beliefs and lifetime patterns  teach how to challenge core beliefs.    Objective #C  Patient will will be working towards his goals .  Status: Continued - Date(s): 10/14/2020     Intervention(s)  Therapist will assign homework to continue to use skills from therapy  provide support to negotiate what works and what patient continues to struggle with.      Goal 2: Patient will not be so negative     I will know I've met my goal when My  would tell me I am nicer, I would be able to define what it means to be a more positive person.      Objective #A (Patient Action)    Status: Continued - Date(s): 10/14/2020     Patient will identify feelings and emotions that occur prior to more negative behaviors.    Intervention(s)  Therapist will assign homework to track patient thoughts and feelings  provide educational materials on cognitive distortions  teach cognitive restructuring.    Objective #B  Patient will identify atleast 3 other responses that I can have other than negative.    Status: Continued - Date(s): 10/14/2020     Intervention(s)  Therapist will assign homework to practice other types of responses  provide educational materials on effective ways to communicate with others  role-play more effective ways to communicate with others.    Objective #C  Patient will learn and demonstrate 3 assertiveness skill(s).  Status: Continued - Date(s):  10/14/2020     Intervention(s)  Therapist will assign homework to use assertive communication  role-play assertiveness skills.      Goal 3: Patient will find a hobby or interest    I will know I've met my goal when I go back to school and feel like I have things that are meaningful.      Objective #A (Patient Action)    Status: Completed - Date: 10/14/2020     Patient will Identify what he would like to do and steps that would get him there.    Intervention(s)  Therapist will assign homework to decide on steps to get towards goals  teach the client how to complete a 4-part pros and cons. to help with decision making.    Objective #B  Patient will identify barriers that keep him from moving forward with his goals.    Status: Continued - Date(s): 10/14/2020     Intervention(s)  Therapist will assign homework to note barriers   teach problem-sovling model.    Objective #C  Patient will engage in problem solving to help him move forward with his interst.  Status: Continued - Date(s): 10/14/2020     Intervention(s)  Therapist will assign homework to carry out possible solutions  provide support to gauge how problem-solving is going and other ways to look at issues.      Patient has reviewed and agreed to the above plan.      Rossy Barnes, [facility]  October 14, 2020

## 2020-11-18 NOTE — TELEPHONE ENCOUNTER
Please see [employer] message and advise.      Thank you,  Raquel SPARROWRN BSN  [facility]  [phone]

## 2020-11-19 RX ORDER — ROSUVASTATIN CALCIUM 5 MG/1
5 TABLET, COATED ORAL DAILY
Qty: 90 TABLET | Refills: 1 | Status: SHIPPED | OUTPATIENT
Start: 2020-11-19 | End: 2021-06-02

## 2020-11-25 ENCOUNTER — VIRTUAL VISIT (OUTPATIENT)
Dept: BEHAVIORAL HEALTH | Facility: CLINIC | Age: 50
End: 2020-11-25
Payer: COMMERCIAL

## 2020-11-25 DIAGNOSIS — F10.10 ALCOHOL USE DISORDER, MILD, ABUSE: Primary | ICD-10-CM

## 2020-11-25 PROCEDURE — 90834 PSYTX W PT 45 MINUTES: CPT | Mod: GT | Performed by: SOCIAL WORKER

## 2020-11-25 NOTE — PROGRESS NOTES
Progress Note    Patient Name: Nick Montiel  Date: 11/25/2020         Service Type: Individual      Session Start Time: 10:00  Session End Time: 10:52     Session Length: 52    Session #: 19    Attendees: Client attended alone    Service Modality:  Video Visit:    Telemedicine Visit: The patient's condition can be safely assessed and treated via synchronous audio and visual telemedicine encounter.      Reason for Telemedicine Visit: Services only offered telehealth    Originating Site (Patient Location): Patient's home    Distant Site (Provider Location): Provider Remote Setting    Consent:  The patient/guardian has verbally consented to: the potential risks and benefits of telemedicine (video visit) versus in person care; bill my insurance or make self-payment for services provided; and responsibility for payment of non-covered services.     Patient would like the video invitation sent by: Send to e-mail at: [email or web address]     Mode of Communication:  Video Conference via Amwell    As the provider I attest to compliance with applicable laws and regulations related to telemedicine.     Treatment Plan Last Reviewed: 10/14/2020    DATA  Interactive Complexity: No  Crisis: No       Progress Since Last Session (Related to Symptoms / Goals / Homework):   Symptoms: Improving Patient took time off from work to do something he enjoys.    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  Patient  reports that stressors have led to more unhappiness in his relationship with .  Patient feeling that no matter what he does he cannot make  happy.  Patient reports feeling irritable and easily snapping when with his  and that this behavior contributes to relationship problems.  Patient notes being concerned about his partner's needs but is not able to articulate own needs in relationship.     Treatment Objective(s) Addressed in This Session:   identify 3-4 strategies to more effectively address stressors  Identifying communication issues     Intervention:   Solution Focused: Patient identified how he continues to manage stress and reported actions he plans to take to move forward with this goals.  Patient also brought up concerns in his relationship about how his partner and him do not have sex frequently.  Patient noted that he does not have much interest in sex.  Patient agreed to first talk to his doctor about this concern.  He was also reminded of how stress can interfere with interest in sex.  Patient also noted that his partner feels rejected because of this and writer reflected on how it appears that he and his partner have different expressions of love in their relationship.  Patient puts a lot of pressure on himself to be a provider in the relationship.      Writer discussed with patient moving to biweekly appointments.  Patient noted that he feels there are things that have not been discussed in sessions.  Writer inquired as to what he feels has not been brought up in in session and he reported that there are ideas he would like to explore.  Patient was provided information on how at the beginning of session when writer inquires about what he would like to talk about that he can bring up these ideas then to make sure that sessions explore areas he would like to discuss.  Patient appears to struggle with communicating his needs with provider and in other areas of his life.            ASSESSMENT: Current Emotional / Mental Status (status of significant symptoms):   Risk status (Self / Other harm or suicidal ideation)   Patient denies current fears or concerns for personal safety.   Patient denies current or recent suicidal ideation or behaviors.   Patient denies current or recent homicidal ideation or behaviors.   Patient denies current or recent self injurious behavior or ideation.   Patient denies other safety concerns.   Patient reports there has been no change in risk factors since their last session.     Patient reports there has been no change in protective factors since their last session.     Recommended that patient call 911 or go to the local ED should there be a change in any of these risk factors.     Appearance:   Appropriate    Eye Contact:   Good    Psychomotor Behavior: Normal    Attitude:   Cooperative  Interested   Orientation:   All   Speech    Rate / Production: Normal/ Responsive    Volume:  Normal    Mood:    Euthymic   Affect:    Appropriate    Thought Content:  Clear    Thought Form:  Coherent  Logical    Insight:    Fair      Medication Review:   No current psychiatric medications prescribed     Medication Compliance:   NA     Changes in Health Issues:   None reported     Chemical Use Review:   Substance Use: decrease in alcohol .  Patient reports frequency of use only on weekends.  Stage of Change: Action        Tobacco Use: No current tobacco use.      Diagnosis:  1. Alcohol use disorder, mild, abuse        Collateral Reports Completed:   Not Applicable    PLAN: (Patient Tasks / Therapist Tasks / Other)   Patient will practice relaxed breathing daily and continue to manage stress more in life  Patient will reach out to other professionals to build skills for his career path        LETICIA Naqvi                                                         ______________________________________________________________________    Treatment  Plan    Patient's Name: Nick Montiel  YOB: 1970    Date: 10/14/2020    DSM5 Diagnoses: Substance-Related & Addictive Disorders Alcohol Use Disorder   303.90 (F10.20) Moderate continued use  Psychosocial / Contextual Factors:  Individual Factors has had problems with alcohol use his adult lifetime, has stolen items and engaged in high risk sexual behaivors more historically then recently, Family Factors Reports a negative relationship with mom and does not speak with dad.  His current partner is concerend about his alcohol use and his mood swings that cause anger, Community Factors he has experienced homelessness and has been engaged in the [employer] and has performed drag and Economic Factors currently works as a  and is economically comfortable and has a history of difficulty maintaining employement.  WHODAS:     Referral / Collaboration:  The following referral(s) will be initiated: Group support to limit alcohol use.[email or web address] or contact [facility]     Anticipated number of session or this episode of care: 10-20      MeasurableTreatment Goal(s) related to diagnosis / functional impairment(s)  Goal 1: Patient will not give up easily    I will know I've met my goal when I am finishing what I started, and completed goals.      Objective #A (Patient Action)    Patient will Identify negative self-talk and behaviors: challenge core beliefs, myths, and actions.  Status: Continued - Date(s): 10/14/2020     Intervention(s)  Therapist will assign homework to engage in cognitive restructuring  provide support to critically look at how negative beliefs keep you from moving forward .    Objective #B  Patient will identify core beliefs and the times in your life that originally created beliefs.  Status: Continued - Date(s): 10/14/2020     Intervention(s)  Therapist will assign homework to challenge core beliefs  provide educational materials on core beliefs and lifetime  patterns  teach how to challenge core beliefs.    Objective #C  Patient will will be working towards his goals .  Status: Continued - Date(s): 10/14/2020     Intervention(s)  Therapist will assign homework to continue to use skills from therapy  provide support to negotiate what works and what patient continues to struggle with.      Goal 2: Patient will not be so negative     I will know I've met my goal when My  would tell me I am nicer, I would be able to define what it means to be a more positive person.      Objective #A (Patient Action)    Status: Continued - Date(s): 10/14/2020     Patient will identify feelings and emotions that occur prior to more negative behaviors.    Intervention(s)  Therapist will assign homework to track patient thoughts and feelings  provide educational materials on cognitive distortions  teach cognitive restructuring.    Objective #B  Patient will identify atleast 3 other responses that I can have other than negative.    Status: Continued - Date(s): 10/14/2020     Intervention(s)  Therapist will assign homework to practice other types of responses  provide educational materials on effective ways to communicate with others  role-play more effective ways to communicate with others.    Objective #C  Patient will learn and demonstrate 3 assertiveness skill(s).  Status: Continued - Date(s): 10/14/2020     Intervention(s)  Therapist will assign homework to use assertive communication  role-play assertiveness skills.      Goal 3: Patient will find a hobby or interest    I will know I've met my goal when I go back to school and feel like I have things that are meaningful.      Objective #A (Patient Action)    Status: Completed - Date: 10/14/2020     Patient will Identify what he would like to do and steps that would get him there.    Intervention(s)  Therapist will assign homework to decide on steps to get towards goals  teach the client how to complete a 4-part pros and cons. to help  with decision making.    Objective #B  Patient will identify barriers that keep him from moving forward with his goals.    Status: Continued - Date(s): 10/14/2020     Intervention(s)  Therapist will assign homework to note barriers   teach problem-sovling model.    Objective #C  Patient will engage in problem solving to help him move forward with his interst.  Status: Continued - Date(s): 10/14/2020     Intervention(s)  Therapist will assign homework to carry out possible solutions  provide support to gauge how problem-solving is going and other ways to look at issues.      Patient has reviewed and agreed to the above plan.      Rossy Barnes, [facility]  October 14, 2020

## 2020-12-02 ENCOUNTER — THERAPY VISIT (OUTPATIENT)
Dept: PHYSICAL THERAPY | Facility: CLINIC | Age: 50
End: 2020-12-02
Payer: COMMERCIAL

## 2020-12-02 ENCOUNTER — VIRTUAL VISIT (OUTPATIENT)
Dept: BEHAVIORAL HEALTH | Facility: CLINIC | Age: 50
End: 2020-12-02
Payer: COMMERCIAL

## 2020-12-02 DIAGNOSIS — M25.512 BILATERAL SHOULDER PAIN: ICD-10-CM

## 2020-12-02 DIAGNOSIS — F10.10 ALCOHOL USE DISORDER, MILD, ABUSE: Primary | ICD-10-CM

## 2020-12-02 DIAGNOSIS — M25.511 BILATERAL SHOULDER PAIN: ICD-10-CM

## 2020-12-02 PROCEDURE — 97110 THERAPEUTIC EXERCISES: CPT | Mod: GP | Performed by: PHYSICAL THERAPIST

## 2020-12-02 PROCEDURE — 90834 PSYTX W PT 45 MINUTES: CPT | Mod: GT | Performed by: SOCIAL WORKER

## 2020-12-02 PROCEDURE — 97140 MANUAL THERAPY 1/> REGIONS: CPT | Mod: GP | Performed by: PHYSICAL THERAPIST

## 2020-12-02 NOTE — PROGRESS NOTES
Progress Note    Patient Name: Nick Montiel  Date: 12/2/2020         Service Type: Individual      Session Start Time: 14:32  Session End Time: 1519     Session Length: 47    Session #: 20    Attendees: Client attended alone    Service Modality:  Video Visit:    Telemedicine Visit: The patient's condition can be safely assessed and treated via synchronous audio and visual telemedicine encounter.      Reason for Telemedicine Visit: Services only offered telehealth    Originating Site (Patient Location): Patient's home    Distant Site (Provider Location): Provider Remote Setting    Consent:  The patient/guardian has verbally consented to: the potential risks and benefits of telemedicine (video visit) versus in person care; bill my insurance or make self-payment for services provided; and responsibility for payment of non-covered services.     Patient would like the video invitation sent by: Send to e-mail at: [email or web address]     Mode of Communication:  Video Conference via Amwell    As the provider I attest to compliance with applicable laws and regulations related to telemedicine.     Treatment Plan Last Reviewed: 10/14/2020    DATA  Interactive Complexity: No  Crisis: No       Progress Since Last Session (Related to Symptoms / Goals / Homework):   Symptoms: Improving patient is continuing to manage his schedule to minimize stress.  Patient was able to identify what he wanted to discuss today.    Homework: Partially completed      Episode of Care Goals: Achieved / completed to satisfaction - PREPARATION (Decided to change - considering how); Intervened by negotiating a change plan and determining options / strategies for behavior change, identifying triggers, exploring social supports, and working towards setting a date to begin behavior change     Current / Ongoing Stressors and Concerns:   Patient reports being stressed about his health and that he has  frozen shoulder in both shoulders.  Patient reports that stressors have led to more unhappiness in his relationship with .  Patient feeling that no matter what he does he cannot make  happy.  Patient reports feeling irritable and easily snapping when with his  and that this behavior contributes to relationship problems.  Patient notes being concerned about his partner's needs but is not able to articulate own needs in relationship.     Treatment Objective(s) Addressed in This Session:   learn & utilize at least 3 assertive communication skills weekly     Intervention:   Narrative Therapy: Patient discussed wanting to talk more about the lack of intimacy in his relationship and wanting to be able to go back to the way that things once were.  Writer discussed the existential concept that we get to make our own futures.  Patient discussed his past relationships and how this made him feel good to get attention through having multiple partners, but that he does not feel this desire anymore.  Writer and patient reflected on how this need is getting met currently in his relationship.  Patient was open to the idea that his needs have changed and working with his  to create spaces to be more intimate.          ASSESSMENT: Current Emotional / Mental Status (status of significant symptoms):   Risk status (Self / Other harm or suicidal ideation)   Patient denies current fears or concerns for personal safety.   Patient denies current or recent suicidal ideation or behaviors.   Patient denies current or recent homicidal ideation or behaviors.   Patient denies current or recent self injurious behavior or ideation.   Patient denies other safety concerns.   Patient reports there has been no change in risk factors since their last session.     Patient reports there has been no change in protective factors since their last session.     Recommended that patient call 911 or go to the local ED should there be  a change in any of these risk factors.     Appearance:   Appropriate    Eye Contact:   Good    Psychomotor Behavior: Normal    Attitude:   Cooperative  Interested   Orientation:   All   Speech    Rate / Production: Normal/ Responsive    Volume:  Normal    Mood:    Euthymic   Affect:    Appropriate    Thought Content:  Clear    Thought Form:  Coherent  Logical    Insight:    Fair      Medication Review:   No current psychiatric medications prescribed     Medication Compliance:   NA     Changes in Health Issues:   None reported     Chemical Use Review:   Substance Use: decrease in alcohol .  Patient reports frequency of use only on weekends.  Stage of Change: Action        Tobacco Use: No current tobacco use.      Diagnosis:  1. Alcohol use disorder, mild, abuse        Collateral Reports Completed:   Not Applicable    PLAN: (Patient Tasks / Therapist Tasks / Other)   Patient will work on increasing intimacy in his relationship        LETICIA Naqvi                                                         ______________________________________________________________________    Treatment Plan    Patient's Name: Nick Montiel  YOB: 1970    Date: 10/14/2020    DSM5 Diagnoses: Substance-Related & Addictive Disorders Alcohol Use Disorder   303.90 (F10.20) Moderate continued use  Psychosocial / Contextual Factors:  Individual Factors has had problems with alcohol use his adult lifetime, has stolen items and engaged in high risk sexual behaivors more historically then recently, Family Factors Reports a negative relationship with mom and does not speak with dad.  His current partner is concerend about his alcohol use and his mood swings that cause anger, Community Factors he has experienced homelessness and has been engaged in the [employer] community and has performed drag and Economic Factors currently works as a  and is economically comfortable and has a history of difficulty  maintaining employement.  WHODAS:     Referral / Collaboration:  The following referral(s) will be initiated: Group support to limit alcohol use.[email or web address] or [facility]     Anticipated number of session or this episode of care: 10-20      MeasurableTreatment Goal(s) related to diagnosis / functional impairment(s)  Goal 1: Patient will not give up easily    I will know I've met my goal when I am finishing what I started, and completed goals.      Objective #A (Patient Action)    Patient will Identify negative self-talk and behaviors: challenge core beliefs, myths, and actions.  Status: Continued - Date(s): 10/14/2020     Intervention(s)  Therapist will assign homework to engage in cognitive restructuring  provide support to critically look at how negative beliefs keep you from moving forward .    Objective #B  Patient will identify core beliefs and the times in your life that originally created beliefs.  Status: Continued - Date(s): 10/14/2020     Intervention(s)  Therapist will assign homework to challenge core beliefs  provide educational materials on core beliefs and lifetime patterns  teach how to challenge core beliefs.    Objective #C  Patient will will be working towards his goals .  Status: Continued - Date(s): 10/14/2020     Intervention(s)  Therapist will assign homework to continue to use skills from therapy  provide support to negotiate what works and what patient continues to struggle with.      Goal 2: Patient will not be so negative     I will know I've met my goal when My  would tell me I am nicer, I would be able to define what it means to be a more positive person.      Objective #A (Patient Action)    Status: Continued - Date(s): 10/14/2020     Patient will identify feelings and emotions that occur prior to more negative behaviors.    Intervention(s)  Therapist will assign homework to track patient thoughts and feelings  provide educational materials on cognitive  distortions  teach cognitive restructuring.    Objective #B  Patient will identify atleast 3 other responses that I can have other than negative.    Status: Continued - Date(s): 10/14/2020     Intervention(s)  Therapist will assign homework to practice other types of responses  provide educational materials on effective ways to communicate with others  role-play more effective ways to communicate with others.    Objective #C  Patient will learn and demonstrate 3 assertiveness skill(s).  Status: Continued - Date(s): 10/14/2020     Intervention(s)  Therapist will assign homework to use assertive communication  role-play assertiveness skills.      Goal 3: Patient will find a hobby or interest    I will know I've met my goal when I go back to school and feel like I have things that are meaningful.      Objective #A (Patient Action)    Status: Completed - Date: 10/14/2020     Patient will Identify what he would like to do and steps that would get him there.    Intervention(s)  Therapist will assign homework to decide on steps to get towards goals  teach the client how to complete a 4-part pros and cons. to help with decision making.    Objective #B  Patient will identify barriers that keep him from moving forward with his goals.    Status: Continued - Date(s): 10/14/2020     Intervention(s)  Therapist will assign homework to note barriers   teach problem-sovling model.    Objective #C  Patient will engage in problem solving to help him move forward with his interst.  Status: Continued - Date(s): 10/14/2020     Intervention(s)  Therapist will assign homework to carry out possible solutions  provide support to gauge how problem-solving is going and other ways to look at issues.      Patient has reviewed and agreed to the above plan.      Rossy Barnes, [facility]  October 14, 2020

## 2020-12-09 ENCOUNTER — VIRTUAL VISIT (OUTPATIENT)
Dept: BEHAVIORAL HEALTH | Facility: CLINIC | Age: 50
End: 2020-12-09
Payer: COMMERCIAL

## 2020-12-09 DIAGNOSIS — F10.10 ALCOHOL USE DISORDER, MILD, ABUSE: Primary | ICD-10-CM

## 2020-12-09 PROCEDURE — 90834 PSYTX W PT 45 MINUTES: CPT | Mod: GT | Performed by: SOCIAL WORKER

## 2020-12-09 NOTE — PROGRESS NOTES
Progress Note    Patient Name: Nick Montile  Date: 12/9/2020         Service Type: Individual      Session Start Time: 11:01  Session End Time: 11:52     Session Length: 51    Session #: 21    Attendees: Client attended alone    Service Modality:  Video Visit:    Telemedicine Visit: The patient's condition can be safely assessed and treated via synchronous audio and visual telemedicine encounter.      Reason for Telemedicine Visit: Services only offered telehealth    Originating Site (Patient Location): Patient's home    Distant Site (Provider Location): Provider Remote Setting    Consent:  The patient/guardian has verbally consented to: the potential risks and benefits of telemedicine (video visit) versus in person care; bill my insurance or make self-payment for services provided; and responsibility for payment of non-covered services.     Patient would like the video invitation sent by: Send to e-mail at: [email or web address]     Mode of Communication:  Video Conference via Amwell    As the provider I attest to compliance with applicable laws and regulations related to telemedicine.     Treatment Plan Last Reviewed: 10/14/2020    DATA  Interactive Complexity: No  Crisis: No       Progress Since Last Session (Related to Symptoms / Goals / Homework):   Symptoms: No change Patient was able to bring item to therapy and that he has been moving forward with his goals.    Homework: Partially completed      Episode of Care Goals: Achieved / completed to satisfaction - PREPARATION (Decided to change - considering how); Intervened by negotiating a change plan and determining options / strategies for behavior change, identifying triggers, exploring social supports, and working towards setting a date to begin behavior change     Current / Ongoing Stressors and Concerns:   Patient reports being stressed about his health and that he has frozen shoulder in both  shoulders.  Patient reports that stressors have led to more unhappiness in his relationship with .  Patient feeling that no matter what he does he cannot make  happy.  Patient reports feeling irritable and easily snapping when with his  and that this behavior contributes to relationship problems.  Patient notes being concerned about his partner's needs but is not able to articulate own needs in relationship.     Treatment Objective(s) Addressed in This Session:   learn & utilize at least 1 assertive communication skills weekly     Intervention:   CBT: Discussed with patient his thoughts about fight with .  Encouraged patient to contextualize his thoughts about his actions in terms of cognitive distortions.  Patient was able to acknowledge black and white thinking about the event.  Patient identified the problem as him being controlling in the situation and not listening to his .  Patient stated that he could listen to his 's concerns in the future and that he could tell his  about the concerns that he has.        ASSESSMENT: Current Emotional / Mental Status (status of significant symptoms):   Risk status (Self / Other harm or suicidal ideation)   Patient denies current fears or concerns for personal safety.   Patient denies current or recent suicidal ideation or behaviors.   Patient denies current or recent homicidal ideation or behaviors.   Patient denies current or recent self injurious behavior or ideation.   Patient denies other safety concerns.   Patient reports there has been no change in risk factors since their last session.     Patient reports there has been no change in protective factors since their last session.     Recommended that patient call 911 or go to the local ED should there be a change in any of these risk factors.     Appearance:   Appropriate    Eye Contact:   Good    Psychomotor Behavior: Normal    Attitude:   Cooperative   Interested   Orientation:   All   Speech    Rate / Production: Normal/ Responsive    Volume:  Normal    Mood:    Irritable    Affect:    Appropriate    Thought Content:  Clear    Thought Form:  Coherent  Logical    Insight:    Fair      Medication Review:   No current psychiatric medications prescribed     Medication Compliance:   NA     Changes in Health Issues:   None reported     Chemical Use Review:   Substance Use: decrease in alcohol .  Patient reports frequency of use only on weekends.  Stage of Change: Action        Tobacco Use: No current tobacco use.      Diagnosis:  1. Alcohol use disorder, mild, abuse        Collateral Reports Completed:   Not Applicable    PLAN: (Patient Tasks / Therapist Tasks / Other)   Patient will talk to his  about what his 's concerns are versus what his own concerns are.        Rossy Barnes, LICSW                                                         ______________________________________________________________________    Treatment Plan    Patient's Name: Nick Montiel  YOB: 1970    Date: 10/14/2020    DSM5 Diagnoses: Substance-Related & Addictive Disorders Alcohol Use Disorder   303.90 (F10.20) Moderate continued use  Psychosocial / Contextual Factors:  Individual Factors has had problems with alcohol use his adult lifetime, has stolen items and engaged in high risk sexual behaivors more historically then recently, Family Factors Reports a negative relationship with mom and does not speak with dad.  His current partner is concerend about his alcohol use and his mood swings that cause anger, Community Factors he has experienced homelessness and has been engaged in the [employer] community and has performed drag and Economic Factors currently works as a  and is economically comfortable and has a history of difficulty maintaining employement.  WHODAS:     Referral / Collaboration:  The following referral(s) will be  initiated: Group support to limit alcohol use.[email or web address] or contact [facility]     Anticipated number of session or this episode of care: 10-20      MeasurableTreatment Goal(s) related to diagnosis / functional impairment(s)  Goal 1: Patient will not give up easily    I will know I've met my goal when I am finishing what I started, and completed goals.      Objective #A (Patient Action)    Patient will Identify negative self-talk and behaviors: challenge core beliefs, myths, and actions.  Status: Continued - Date(s): 10/14/2020     Intervention(s)  Therapist will assign homework to engage in cognitive restructuring  provide support to critically look at how negative beliefs keep you from moving forward .    Objective #B  Patient will identify core beliefs and the times in your life that originally created beliefs.  Status: Continued - Date(s): 10/14/2020     Intervention(s)  Therapist will assign homework to challenge core beliefs  provide educational materials on core beliefs and lifetime patterns  teach how to challenge core beliefs.    Objective #C  Patient will will be working towards his goals .  Status: Continued - Date(s): 10/14/2020     Intervention(s)  Therapist will assign homework to continue to use skills from therapy  provide support to negotiate what works and what patient continues to struggle with.      Goal 2: Patient will not be so negative     I will know I've met my goal when My  would tell me I am nicer, I would be able to define what it means to be a more positive person.      Objective #A (Patient Action)    Status: Continued - Date(s): 10/14/2020     Patient will identify feelings and emotions that occur prior to more negative behaviors.    Intervention(s)  Therapist will assign homework to track patient thoughts and feelings  provide educational materials on cognitive distortions  teach cognitive restructuring.    Objective #B  Patient will identify atleast 3 other responses  that I can have other than negative.    Status: Continued - Date(s): 10/14/2020     Intervention(s)  Therapist will assign homework to practice other types of responses  provide educational materials on effective ways to communicate with others  role-play more effective ways to communicate with others.    Objective #C  Patient will learn and demonstrate 3 assertiveness skill(s).  Status: Continued - Date(s): 10/14/2020     Intervention(s)  Therapist will assign homework to use assertive communication  role-play assertiveness skills.      Goal 3: Patient will find a hobby or interest    I will know I've met my goal when I go back to school and feel like I have things that are meaningful.      Objective #A (Patient Action)    Status: Completed - Date: 10/14/2020     Patient will Identify what he would like to do and steps that would get him there.    Intervention(s)  Therapist will assign homework to decide on steps to get towards goals  teach the client how to complete a 4-part pros and cons. to help with decision making.    Objective #B  Patient will identify barriers that keep him from moving forward with his goals.    Status: Continued - Date(s): 10/14/2020     Intervention(s)  Therapist will assign homework to note barriers   teach problem-sovling model.    Objective #C  Patient will engage in problem solving to help him move forward with his interst.  Status: Continued - Date(s): 10/14/2020     Intervention(s)  Therapist will assign homework to carry out possible solutions  provide support to gauge how problem-solving is going and other ways to look at issues.      Patient has reviewed and agreed to the above plan.      Rossy Barnes, [facility]  October 14, 2020

## 2020-12-16 ENCOUNTER — THERAPY VISIT (OUTPATIENT)
Dept: PHYSICAL THERAPY | Facility: CLINIC | Age: 50
End: 2020-12-16
Payer: COMMERCIAL

## 2020-12-16 DIAGNOSIS — M25.511 BILATERAL SHOULDER PAIN: ICD-10-CM

## 2020-12-16 DIAGNOSIS — M25.512 BILATERAL SHOULDER PAIN: ICD-10-CM

## 2020-12-16 PROCEDURE — 97110 THERAPEUTIC EXERCISES: CPT | Mod: GP | Performed by: PHYSICAL THERAPIST

## 2020-12-16 PROCEDURE — 97140 MANUAL THERAPY 1/> REGIONS: CPT | Mod: GP | Performed by: PHYSICAL THERAPIST

## 2020-12-16 NOTE — PROGRESS NOTES
"Subjective:  HPI  Physical Exam                    Objective:  System    Physical Exam    General     ROS    Assessment/Plan:    PROGRESS/DISCHARGE REPORT    Progress reporting period is from 9-2-20 to 12-16-20, 14 visits.       SUBJECTIVE   Patient initially c/o constant pain/ \"ache\" left upper arm with intermittent \"sharp\" pain ranging 1-10/10 and describes feeling of \"heaviness\" left upper arm with lifting the arm and intermittent \"twitching\" left upper arm.  Right shoulder pain was intermittent 0-8/10, described as \"sharp\".  Left shoulder pain increased/had difficulty with lifting the arm overhead to the front, side and behind, (difficulty dressing and getting belt on), unable to use left arm to hook seatbelt and pass credit card out of -side window.  No pain/issue with lying on the left side, but pain left arm with lying on the right side.   Right shoulder pain increased with reaching behind and needing to carefully position right arm when lying on the left.   .    After starting PT left UE \"twitching/heaviness\" stopped, and states just recently he has had some return of \"tingling\" left UE to the hand intermittently, no \"heaviness\".  He also has intermittent \"achiness\" bilateral shoulders, up to 4/10.  No neck or upper back pain.  Doing exercises consistently.  Continues to have significant difficulty getting coats on.  Overall frustrated with slow progress, continued discomfort and decreased function but feels he is making progress.    Current Pain level: 3/10(right shoulder).     Initial Pain level: (1-10/10).   Changes in function:  Yes, slow; with left >right  Adverse reaction to treatment or activity: None    OBJECTIVE  Today(initial):    AROM/PROM right shoulder [id number] (128/160) Abduction 143/143 (126/159)  ER (at 60 degrees abduction) 5/15  Ext/IR to L4 (T12)     AROM/PROM left shoulder   Flexion 102/108 (80/NT)  Abduction 101/121 (38/NT)   ER 0/0 (at 60 degrees abduction), (0 at 45 " abduction) Ext/IR to L5 (lateral buttock).      Patient has excessive thoracic kyphosis/imobility contributing to shoulder restriction as well as cervical involvement contributing primarily to left UE symptoms.    ASSESSMENT/PLAN  Updated problem list and treatment plan: Diagnosis 1:  B shoulder pain    Pain -  self management, education and home program  Decreased ROM/flexibility - manual therapy, therapeutic exercise and home program  Decreased strength - therapeutic exercise and therapeutic activities  Impaired posture - neuro re-education and home program  STG/LTGs have been met or progress has been made towards goals:  Yes, discomfort and symptoms improved and left shoulder progressing slowly; minimal progress right shoulder ROM   Assessment of Progress: The patient's progress has slowed.  Self Management Plans:  Patient has been instructed in a home treatment program.      Recommendations:  Questionable whether further PT would be of benefit vs continue HEP.  Please advise.    Please refer to the daily flowsheet for treatment today, total treatment time and time spent performing 1:1 timed codes.    Addendum 5-6-21:  Patient did not return following MD visit.  Will discontinue PT chart.  Brenda Nixon PT

## 2020-12-23 ENCOUNTER — OFFICE VISIT (OUTPATIENT)
Dept: ORTHOPEDICS | Facility: CLINIC | Age: 50
End: 2020-12-23
Payer: COMMERCIAL

## 2020-12-23 ENCOUNTER — VIRTUAL VISIT (OUTPATIENT)
Dept: PSYCHOLOGY | Facility: CLINIC | Age: 50
End: 2020-12-23
Payer: COMMERCIAL

## 2020-12-23 VITALS
HEIGHT: 67 IN | BODY MASS INDEX: 26.84 KG/M2 | WEIGHT: 171 LBS | DIASTOLIC BLOOD PRESSURE: 80 MMHG | SYSTOLIC BLOOD PRESSURE: 120 MMHG

## 2020-12-23 DIAGNOSIS — M75.02 ADHESIVE CAPSULITIS OF LEFT SHOULDER: Primary | ICD-10-CM

## 2020-12-23 DIAGNOSIS — F10.10 ALCOHOL USE DISORDER, MILD, ABUSE: Primary | ICD-10-CM

## 2020-12-23 DIAGNOSIS — M75.01 ADHESIVE CAPSULITIS OF RIGHT SHOULDER: ICD-10-CM

## 2020-12-23 PROCEDURE — 90834 PSYTX W PT 45 MINUTES: CPT | Mod: GT | Performed by: SOCIAL WORKER

## 2020-12-23 PROCEDURE — 99213 OFFICE O/P EST LOW 20 MIN: CPT | Performed by: FAMILY MEDICINE

## 2020-12-23 ASSESSMENT — MIFFLIN-ST. JEOR: SCORE: 1594.28

## 2020-12-23 NOTE — LETTER
"    12/23/2020         RE: Nick Montiel  [address]  [place] [address]        Dear Colleague,    Thank you for referring your patient, Nick Montiel, to the [facility] [place]. Please see a copy of my visit note below.    HPI     [facility]   Follow-up Visit  Dec 23, 2020    PCP: Babs Dumas      Subjective:  Nick is a 50 year old male who is seen in follow up for evaluation of:    Left Shoulder, follow up  Right Shoulder, follow up    His last visit was on 9/23/2020.  Since that time, symptoms have been better than before. He has been doing physical therapy, and his physical therapist suggested that he follow up with us. His physical therapist states his range of motion has increased about 20%. He is wondering if his statin has caused his problems, and he is worried his problems are stemming from his neck. Nick Montiel is accompanied today by self.     Patient reports 20% improvement since last visit. Still has problems putting on coat and problems with limited range of motion  Patient has noticed improved symptoms with physical therapy.   Patient has no swelling  Pain waxes and wanes, his biggest concern is his limited range of motion.  Patient is using no aids.    Patient denies any new injuries.    Patient's past medical, surgical, social and family histories are reviewed today.    History from previous visit on 9/23/2020   [facility]   Clinic Visit s Sep 23, 2020    Today's Visit:  Nick is a 50 year old male who is seen as self referral for   Chief Complaint   Patient presents with     Left Shoulder - Follow Up       Injury: Reports insidious onset without acute precipitating event.     Right hand dominant    Location of Pain: left shoulder lateral, nonradiating . Reports shoulder is \"immobile\". Possible triggered by meds. RIGHT shoulder similar but not as bad.     Duration of " Pain: acute, 3 month(s),   Rating of Pain at worst: 10/10  Rating of Pain Currently: 1/10  Pain is better with: nothing   Pain is worse with: lifting, movement, raising his arm  Treatment so far consists of: other medications: Prednisone (Medrol) - beginning of august - got rid of most of pain, ibuprofen, tylenol, PHYSICAL THERAPY   Associated symptoms: numbness and tingling in hand, pain - some symptoms a year ago, nothing until just yesterday  Recent imaging completed: X-rays completed 9/23/20.  Prior History of related problems: RIGHT elbow fracture - old - loss of elbow ROM    Has continued PHYSICAL THERAPY planned.    Social History: is employed as a realtor        Patient Active Problem List    Diagnosis Date Noted     Hypercholesterolemia 11/09/2020     Priority: Medium     Bilateral shoulder pain 09/02/2020     Priority: Medium     Alcohol use disorder, mild, abuse 07/08/2020     Priority: Medium       FMHX denies sig illnesses.    Social History     Socioeconomic History     Marital status:      Spouse name: None     Number of children: None     Years of education: None     Highest education level: None   Occupational History     None   Social Needs     Financial resource strain: None     Food insecurity     Worry: None     Inability: None     Transportation needs     Medical: None     Non-medical: None   Tobacco Use     Smoking status: Former Smoker     Smokeless tobacco: Never Used   Substance and Sexual Activity     Alcohol use: Yes     Comment: 3 drinks per week     Drug use: Never     Sexual activity: Yes     Partners: Male   Lifestyle     Physical activity     Days per week: None     Minutes per session: None     Stress: None   Relationships     Social connections     Talks on phone: None     Gets together: None     Attends Quaker service: None     Active member of club or organization: None     Attends meetings of clubs or organizations: None     Relationship status: None     Intimate  "partner violence     Fear of current or ex partner: None     Emotionally abused: None     Physically abused: None     Forced sexual activity: None   Other Topics Concern     None   Social History Narrative     None       Past Surgical History:   Procedure Laterality Date     ORTHOPEDIC SURGERY Right     Right elbow break, early 90's           Review of Systems   Musculoskeletal: Positive for joint pain.   All other systems reviewed and are negative.        Physical Exam    Ht 1.702 m (5' 7\")   Wt 77.6 kg (171 lb)   BMI 26.78 kg/m    Constitutional:well-developed, well-nourished, and in no distress.   Cardiovascular: Intact distal pulses.    Neurological: alert. Gait Normal:   Gait, station, stance, and balance appear normal for age  Skin: Skin is warm and dry.   Psychiatric: Mood and affect normal.   Respiratory: unlabored, speaks in full sentences  Lymph: no LAD, no lymphangitis          Right Shoulder Exam     Tenderness   The patient is experiencing no tenderness.    Range of Motion   Active abduction: 70   Passive abduction: 70   External rotation: 30   Forward flexion: 90     Muscle Strength   Abduction: 5/5   Internal rotation: 5/5   External rotation: 5/5   Supraspinatus: 5/5   Subscapularis: 5/5   Biceps: 5/5     Tests   Cardenas test: positive  Cross arm: negative  Impingement: positive  Drop arm: positive  Sulcus: absent    Other   Erythema: absent  Scars: absent  Sensation: normal  Pulse: present      Left Shoulder Exam     Tenderness   Left shoulder tenderness location: Anterior capsule.    Range of Motion   Active abduction: 70   Passive abduction: 70   External rotation: 20   Forward flexion: 70   Internal rotation 0 degrees: Sacrum     Muscle Strength   Abduction: 5/5   Internal rotation: 5/5   External rotation: 5/5   Supraspinatus: 5/5   Subscapularis: 5/5   Biceps: 5/5     Tests   Cardenas test: positive  Cross arm: negative  Impingement: positive  Drop arm: positive    Other   Erythema: " absent  Scars: absent  Sensation: normal  Pulse: present                 ASSESSMENT/PLAN    ICD-10-CM    1. Adhesive capsulitis of left shoulder  M75.02    2. Adhesive capsulitis of right shoulder  M75.01      persistent left frozen shoulder, now frozen on RIGHT also.  Has been doing therapy diligently, but now has new onset limited motion of the right shoulder.  Offered glenohumeral cortisone injections using ultrasound guidance to reduce pain and help accelerate resolution and he was comfortable proceeding at this point.  He will schedule this at a later date.  Continue therapy, may need referral for new right shoulder frozen shoulder      Again, thank you for allowing me to participate in the care of your patient.        Sincerely,        Khoi Corona MD

## 2020-12-23 NOTE — PROGRESS NOTES
"HPI     [facility]   Follow-up Visit  Dec 23, 2020    PCP: Babs Dumas      Subjective:  Nick is a 50 year old male who is seen in follow up for evaluation of:    Left Shoulder, follow up  Right Shoulder, follow up    His last visit was on 9/23/2020.  Since that time, symptoms have been better than before. He has been doing physical therapy, and his physical therapist suggested that he follow up with us. His physical therapist states his range of motion has increased about 20%. He is wondering if his statin has caused his problems, and he is worried his problems are stemming from his neck. Nick Montiel is accompanied today by self.     Patient reports 20% improvement since last visit. Still has problems putting on coat and problems with limited range of motion  Patient has noticed improved symptoms with physical therapy.   Patient has no swelling  Pain waxes and wanes, his biggest concern is his limited range of motion.  Patient is using no aids.    Patient denies any new injuries.    Patient's past medical, surgical, social and family histories are reviewed today.    History from previous visit on 9/23/2020   [facility]   Clinic Visit s Sep 23, 2020    Today's Visit:  Nick is a 50 year old male who is seen as self referral for   Chief Complaint   Patient presents with     Left Shoulder - Follow Up       Injury: Reports insidious onset without acute precipitating event.     Right hand dominant    Location of Pain: left shoulder lateral, nonradiating . Reports shoulder is \"immobile\". Possible triggered by meds. RIGHT shoulder similar but not as bad.     Duration of Pain: acute, 3 month(s),   Rating of Pain at worst: 10/10  Rating of Pain Currently: 1/10  Pain is better with: nothing   Pain is worse with: lifting, movement, raising his arm  Treatment so far consists of: other medications: Prednisone (Medrol) - beginning of august - got rid of most of " pain, ibuprofen, tylenol, PHYSICAL THERAPY   Associated symptoms: numbness and tingling in hand, pain - some symptoms a year ago, nothing until just yesterday  Recent imaging completed: X-rays completed 9/23/20.  Prior History of related problems: RIGHT elbow fracture - old - loss of elbow ROM    Has continued PHYSICAL THERAPY planned.    Social History: is employed as a realtor        Patient Active Problem List    Diagnosis Date Noted     Hypercholesterolemia 11/09/2020     Priority: Medium     Bilateral shoulder pain 09/02/2020     Priority: Medium     Alcohol use disorder, mild, abuse 07/08/2020     Priority: Medium       FMHX denies sig illnesses.    Social History     Socioeconomic History     Marital status:      Spouse name: None     Number of children: None     Years of education: None     Highest education level: None   Occupational History     None   Social Needs     Financial resource strain: None     Food insecurity     Worry: None     Inability: None     Transportation needs     Medical: None     Non-medical: None   Tobacco Use     Smoking status: Former Smoker     Smokeless tobacco: Never Used   Substance and Sexual Activity     Alcohol use: Yes     Comment: 3 drinks per week     Drug use: Never     Sexual activity: Yes     Partners: Male   Lifestyle     Physical activity     Days per week: None     Minutes per session: None     Stress: None   Relationships     Social connections     Talks on phone: None     Gets together: None     Attends Taoism service: None     Active member of club or organization: None     Attends meetings of clubs or organizations: None     Relationship status: None     Intimate partner violence     Fear of current or ex partner: None     Emotionally abused: None     Physically abused: None     Forced sexual activity: None   Other Topics Concern     None   Social History Narrative     None       Past Surgical History:   Procedure Laterality Date     ORTHOPEDIC SURGERY  "Right     Right elbow break, early 90's           Review of Systems   Musculoskeletal: Positive for joint pain.   All other systems reviewed and are negative.        Physical Exam    Ht 1.702 m (5' 7\")   Wt 77.6 kg (171 lb)   BMI 26.78 kg/m    Constitutional:well-developed, well-nourished, and in no distress.   Cardiovascular: Intact distal pulses.    Neurological: alert. Gait Normal:   Gait, station, stance, and balance appear normal for age  Skin: Skin is warm and dry.   Psychiatric: Mood and affect normal.   Respiratory: unlabored, speaks in full sentences  Lymph: no LAD, no lymphangitis          Right Shoulder Exam     Tenderness   The patient is experiencing no tenderness.    Range of Motion   Active abduction: 70   Passive abduction: 70   External rotation: 30   Forward flexion: 90     Muscle Strength   Abduction: 5/5   Internal rotation: 5/5   External rotation: 5/5   Supraspinatus: 5/5   Subscapularis: 5/5   Biceps: 5/5     Tests   Cardenas test: positive  Cross arm: negative  Impingement: positive  Drop arm: positive  Sulcus: absent    Other   Erythema: absent  Scars: absent  Sensation: normal  Pulse: present      Left Shoulder Exam     Tenderness   Left shoulder tenderness location: Anterior capsule.    Range of Motion   Active abduction: 70   Passive abduction: 70   External rotation: 20   Forward flexion: 70   Internal rotation 0 degrees: Sacrum     Muscle Strength   Abduction: 5/5   Internal rotation: 5/5   External rotation: 5/5   Supraspinatus: 5/5   Subscapularis: 5/5   Biceps: 5/5     Tests   Cardenas test: positive  Cross arm: negative  Impingement: positive  Drop arm: positive    Other   Erythema: absent  Scars: absent  Sensation: normal  Pulse: present                 ASSESSMENT/PLAN    ICD-10-CM    1. Adhesive capsulitis of left shoulder  M75.02    2. Adhesive capsulitis of right shoulder  M75.01      persistent left frozen shoulder, now frozen on RIGHT also.  Has been doing therapy diligently, " but now has new onset limited motion of the right shoulder.  Offered glenohumeral cortisone injections using ultrasound guidance to reduce pain and help accelerate resolution and he was comfortable proceeding at this point.  He will schedule this at a later date.  Continue therapy, may need referral for new right shoulder frozen shoulder

## 2020-12-23 NOTE — PROGRESS NOTES
Progress Note    Patient Name: Nick Montiel  Date: 12/23/2020         Service Type: Individual      Session Start Time: 10:00  Session End Time: 10:50     Session Length: 50    Session #: 22    Attendees: Client attended alone    Service Modality:  Video Visit:    Telemedicine Visit: The patient's condition can be safely assessed and treated via synchronous audio and visual telemedicine encounter.      Reason for Telemedicine Visit: Services only offered telehealth    Originating Site (Patient Location): Patient's home    Distant Site (Provider Location): Provider Remote Setting    Consent:  The patient/guardian has verbally consented to: the potential risks and benefits of telemedicine (video visit) versus in person care; bill my insurance or make self-payment for services provided; and responsibility for payment of non-covered services.     Patient would like the video invitation sent by: Send to e-mail at: [email or web address]     Mode of Communication:  Video Conference via [employer]    As the provider I attest to compliance with applicable laws and regulations related to telemedicine.     Treatment Plan Last Reviewed: 10/14/2020    DATA  Interactive Complexity: No  Crisis: No       Progress Since Last Session (Related to Symptoms / Goals / Homework):   Symptoms: Improving Patient reports that he was able to be more kind with .    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  Patient reports that stressors have led to more unhappiness in his relationship with .  Patient feeling that no matter what he does he cannot make   happy.  Patient reports feeling irritable and easily snapping when with his  and that this behavior contributes to relationship problems.      Treatment Objective(s) Addressed in This Session:   identify 2-3 strategies to more effectively address stressors  learn & utilize at least 1 assertive communication skills weekly     Intervention:   CBT: Discussed patient's plan to manage stress more effectively while his  works less.  Patient has plan to work more days, but plans to have time built in to spend with .  Patient also discussed his interest in supporting  with projects.  Patient discussed how he is moving forward with going back to school.  Patient and writer discussed communication styles that can be more supportive when giving advice.        ASSESSMENT: Current Emotional / Mental Status (status of significant symptoms):   Risk status (Self / Other harm or suicidal ideation)   Patient denies current fears or concerns for personal safety.   Patient denies current or recent suicidal ideation or behaviors.   Patient denies current or recent homicidal ideation or behaviors.   Patient denies current or recent self injurious behavior or ideation.   Patient denies other safety concerns.   Patient reports there has been no change in risk factors since their last session.     Patient reports there has been no change in protective factors since their last session.     Recommended that patient call 911 or go to the local ED should there be a change in any of these risk factors.     Appearance:   Appropriate    Eye Contact:   Good    Psychomotor Behavior: Normal    Attitude:   Cooperative  Interested Guarded    Orientation:   All   Speech    Rate / Production: Normal/ Responsive    Volume:  Normal    Mood:    Normal and stressed   Affect:    Appropriate    Thought Content:  Clear    Thought Form:  Coherent  Logical    Insight:    Fair      Medication Review:   No current psychiatric medications  prescribed     Medication Compliance:   NA     Changes in Health Issues:   None reported     Chemical Use Review:   Substance Use: decrease in alcohol .  Patient reports frequency of use only on weekends.  Stage of Change: Action        Tobacco Use: No current tobacco use.      Diagnosis:  1. Alcohol use disorder, mild, abuse        Collateral Reports Completed:   Not Applicable    PLAN: (Patient Tasks / Therapist Tasks / Other)   Patient will work on managing his stress to support         Rossy Barnes, LICSW                                                         ______________________________________________________________________    Treatment Plan    Patient's Name: Nick Montiel  YOB: 1970    Date: 10/14/2020    DSM5 Diagnoses: Substance-Related & Addictive Disorders Alcohol Use Disorder   303.90 (F10.20) Moderate continued use  Psychosocial / Contextual Factors:  Individual Factors has had problems with alcohol use his adult lifetime, has stolen items and engaged in high risk sexual behaivors more historically then recently, Family Factors Reports a negative relationship with mom and does not speak with dad.  His current partner is concerend about his alcohol use and his mood swings that cause anger, Community Factors he has experienced homelessness and has been engaged in the [employer] community and has performed drag and Economic Factors currently works as a  and is economically comfortable and has a history of difficulty maintaining employement.  WHODAS:     Referral / Collaboration:  The following referral(s) will be initiated: Group support to limit alcohol use.[email or web address] or contact Daphne Chen     Anticipated number of session or this episode of care: 10-20      MeasurableTreatment Goal(s) related to diagnosis / functional impairment(s)  Goal 1: Patient will not give up easily    I will know I've met my goal when I am finishing what I started,  and completed goals.      Objective #A (Patient Action)    Patient will Identify negative self-talk and behaviors: challenge core beliefs, myths, and actions.  Status: Continued - Date(s): 10/14/2020     Intervention(s)  Therapist will assign homework to engage in cognitive restructuring  provide support to critically look at how negative beliefs keep you from moving forward .    Objective #B  Patient will identify core beliefs and the times in your life that originally created beliefs.  Status: Continued - Date(s): 10/14/2020     Intervention(s)  Therapist will assign homework to challenge core beliefs  provide educational materials on core beliefs and lifetime patterns  teach how to challenge core beliefs.    Objective #C  Patient will will be working towards his goals .  Status: Continued - Date(s): 10/14/2020     Intervention(s)  Therapist will assign homework to continue to use skills from therapy  provide support to negotiate what works and what patient continues to struggle with.      Goal 2: Patient will not be so negative     I will know I've met my goal when My  would tell me I am nicer, I would be able to define what it means to be a more positive person.      Objective #A (Patient Action)    Status: Continued - Date(s): 10/14/2020     Patient will identify feelings and emotions that occur prior to more negative behaviors.    Intervention(s)  Therapist will assign homework to track patient thoughts and feelings  provide educational materials on cognitive distortions  teach cognitive restructuring.    Objective #B  Patient will identify atleast 3 other responses that I can have other than negative.    Status: Continued - Date(s): 10/14/2020     Intervention(s)  Therapist will assign homework to practice other types of responses  provide educational materials on effective ways to communicate with others  role-play more effective ways to communicate with others.    Objective #C  Patient will learn and  demonstrate 3 assertiveness skill(s).  Status: Continued - Date(s): 10/14/2020     Intervention(s)  Therapist will assign homework to use assertive communication  role-play assertiveness skills.      Goal 3: Patient will find a hobby or interest    I will know I've met my goal when I go back to school and feel like I have things that are meaningful.      Objective #A (Patient Action)    Status: Completed - Date: 10/14/2020     Patient will Identify what he would like to do and steps that would get him there.    Intervention(s)  Therapist will assign homework to decide on steps to get towards goals  teach the client how to complete a 4-part pros and cons. to help with decision making.    Objective #B  Patient will identify barriers that keep him from moving forward with his goals.    Status: Continued - Date(s): 10/14/2020     Intervention(s)  Therapist will assign homework to note barriers   teach problem-sovling model.    Objective #C  Patient will engage in problem solving to help him move forward with his interst.  Status: Continued - Date(s): 10/14/2020     Intervention(s)  Therapist will assign homework to carry out possible solutions  provide support to gauge how problem-solving is going and other ways to look at issues.      Patient has reviewed and agreed to the above plan.      Rossy Barnes, [facility]  October 14, 2020

## 2020-12-29 ENCOUNTER — OFFICE VISIT (OUTPATIENT)
Dept: ORTHOPEDICS | Facility: CLINIC | Age: 50
End: 2020-12-29
Payer: COMMERCIAL

## 2020-12-29 DIAGNOSIS — M75.02 ADHESIVE CAPSULITIS OF LEFT SHOULDER: Primary | ICD-10-CM

## 2020-12-29 DIAGNOSIS — M75.01 ADHESIVE CAPSULITIS OF RIGHT SHOULDER: ICD-10-CM

## 2020-12-29 PROCEDURE — 20611 DRAIN/INJ JOINT/BURSA W/US: CPT | Mod: 50 | Performed by: FAMILY MEDICINE

## 2020-12-29 RX ORDER — ROPIVACAINE HYDROCHLORIDE 5 MG/ML
3 INJECTION, SOLUTION EPIDURAL; INFILTRATION; PERINEURAL
Status: SHIPPED | OUTPATIENT
Start: 2020-12-29

## 2020-12-29 RX ORDER — BETAMETHASONE SODIUM PHOSPHATE AND BETAMETHASONE ACETATE 3; 3 MG/ML; MG/ML
6 INJECTION, SUSPENSION INTRA-ARTICULAR; INTRALESIONAL; INTRAMUSCULAR; SOFT TISSUE
Status: SHIPPED | OUTPATIENT
Start: 2020-12-29

## 2020-12-29 RX ADMIN — ROPIVACAINE HYDROCHLORIDE 3 ML: 5 INJECTION, SOLUTION EPIDURAL; INFILTRATION; PERINEURAL at 15:21

## 2020-12-29 RX ADMIN — BETAMETHASONE SODIUM PHOSPHATE AND BETAMETHASONE ACETATE 6 MG: 3; 3 INJECTION, SUSPENSION INTRA-ARTICULAR; INTRALESIONAL; INTRAMUSCULAR; SOFT TISSUE at 15:21

## 2020-12-29 NOTE — LETTER
12/29/2020         RE: Nick Montiel  [address]  [place] [address]        Dear Colleague,    Thank you for referring your patient, Nick Montiel, to the [facility] [place]. Please see a copy of my visit note below.    Large Joint Injection/Arthocentesis: bilateral glenohumeral    Date/Time: 12/29/2020 3:21 PM  Performed by: Khoi Corona MD  Authorized by: Khoi Corona MD     Indications:  Pain  Indications comment:  Frozen shoulder  Needle Size:  22 G  Guidance: ultrasound    Approach:  Superior  Location:  Shoulder  Laterality:  Bilateral      Site:  Bilateral glenohumeral  Medications (Right):  6 mg betamethasone acet & sod phos 6 (3-3) MG/ML; 3 mL ropivacaine 5 MG/ML   Medications (Right) comment:  9 mL of ropivacaine was administered   Medications (Left):  6 mg betamethasone acet & sod phos 6 (3-3) MG/ML; 3 mL ropivacaine 5 MG/ML   Medications (Left) comment:  9 mL of ropivacaine was administered   Outcome:  Tolerated well, no immediate complications  Procedure discussed: discussed risks, benefits, and alternatives    Consent Given by:  Patient  Timeout: timeout called immediately prior to procedure    Prep: patient was prepped and draped in usual sterile fashion              Again, thank you for allowing me to participate in the care of your patient.        Sincerely,        Khoi Corona MD

## 2020-12-29 NOTE — PROGRESS NOTES
Large Joint Injection/Arthocentesis: bilateral glenohumeral    Date/Time: 12/29/2020 3:21 PM  Performed by: Khoi Corona MD  Authorized by: Khoi Corona MD     Indications:  Pain  Indications comment:  Frozen shoulder  Needle Size:  22 G  Guidance: ultrasound    Approach:  Superior  Location:  Shoulder  Laterality:  Bilateral      Site:  Bilateral glenohumeral  Medications (Right):  6 mg betamethasone acet & sod phos 6 (3-3) MG/ML; 3 mL ropivacaine 5 MG/ML   Medications (Right) comment:  9 mL of ropivacaine was administered   Medications (Left):  6 mg betamethasone acet & sod phos 6 (3-3) MG/ML; 3 mL ropivacaine 5 MG/ML   Medications (Left) comment:  9 mL of ropivacaine was administered   Outcome:  Tolerated well, no immediate complications  Procedure discussed: discussed risks, benefits, and alternatives    Consent Given by:  Patient  Timeout: timeout called immediately prior to procedure    Prep: patient was prepped and draped in usual sterile fashion

## 2021-01-04 ENCOUNTER — HEALTH MAINTENANCE LETTER (OUTPATIENT)
Age: 51
End: 2021-01-04

## 2021-01-06 ENCOUNTER — VIRTUAL VISIT (OUTPATIENT)
Dept: PSYCHOLOGY | Facility: CLINIC | Age: 51
End: 2021-01-06
Payer: COMMERCIAL

## 2021-01-06 DIAGNOSIS — F10.10 ALCOHOL USE DISORDER, MILD, ABUSE: Primary | ICD-10-CM

## 2021-01-06 PROCEDURE — 90834 PSYTX W PT 45 MINUTES: CPT | Mod: GT | Performed by: SOCIAL WORKER

## 2021-01-06 NOTE — PROGRESS NOTES
Progress Note    Patient Name: Nick Montiel  Date: 1/6/2021         Service Type: Individual      Session Start Time: 10:00  Session End Time: 10:52     Session Length: 52    Session #: 23    Attendees: Client attended alone    Service Modality:  Video Visit:    Telemedicine Visit: The patient's condition can be safely assessed and treated via synchronous audio and visual telemedicine encounter.      Reason for Telemedicine Visit: Services only offered telehealth    Originating Site (Patient Location): Patient's home    Distant Site (Provider Location): Provider Remote Setting    Consent:  The patient/guardian has verbally consented to: the potential risks and benefits of telemedicine (video visit) versus in person care; bill my insurance or make self-payment for services provided; and responsibility for payment of non-covered services.     Patient would like the video invitation sent by: Send to e-mail at: [email or web address]     Mode of Communication:  Video Conference via [employer]    As the provider I attest to compliance with applicable laws and regulations related to telemedicine.     Treatment Plan Last Reviewed: 1/6/2021    DATA  Interactive Complexity: No  Crisis: No       Progress Since Last Session (Related to Symptoms / Goals / Homework):   Symptoms: No change Patient was open about fight with     Homework: Achieved / completed to satisfaction      Episode of Care Goals: Achieved / completed to satisfaction - ACTION (Actively working towards change); Intervened by reinforcing change plan / affirming steps taken     Current / Ongoing Stressors and Concerns:   Patient reports being stressed about his health and that he has frozen shoulder in both shoulders.  Patient reports that stressors have led to more unhappiness in his relationship with .  Patient feeling that no matter what he does he cannot make  happy.  Patient reports  feeling irritable and easily snapping when with his  and that this behavior contributes to relationship problems.      Treatment Objective(s) Addressed in This Session:   identify 2-3 strategies to more effectively address stressors  learn & utilize at least 1 assertive communication skills weekly     Intervention:   ACT: Discussed patient's fight with  and communication that was successful and unsuccessful.  Patient was able to see areas that he could have used more effective communication.  Patient and writer discussed how patient was using control and patient agreeing the need to give up more control in the relationship.  Patient was provided information on acceptance.  Patient was able to explore that he can accept that his  is struggling with aging.  Patient also provided information on the idea of giving up the vargas.           ASSESSMENT: Current Emotional / Mental Status (status of significant symptoms):   Risk status (Self / Other harm or suicidal ideation)   Patient denies current fears or concerns for personal safety.   Patient denies current or recent suicidal ideation or behaviors.   Patient denies current or recent homicidal ideation or behaviors.   Patient denies current or recent self injurious behavior or ideation.   Patient denies other safety concerns.   Patient reports there has been no change in risk factors since their last session.     Patient reports there has been no change in protective factors since their last session.     Recommended that patient call 911 or go to the local ED should there be a change in any of these risk factors.     Appearance:   Appropriate    Eye Contact:   Good    Psychomotor Behavior: Normal    Attitude:   Cooperative  Interested   Orientation:   All   Speech    Rate / Production: Talkative    Volume:  Normal    Mood:    stressed   Affect:    Subdued    Thought Content:  Clear  Rumination    Thought Form:  Coherent  Logical    Insight:    Fair       Medication Review:   No current psychiatric medications prescribed     Medication Compliance:   NA     Changes in Health Issues:   None reported     Chemical Use Review:   Substance Use: decrease in alcohol .  Patient reports frequency of use only on weekends.  Stage of Change: Action        Tobacco Use: No current tobacco use.      Diagnosis:  1. Alcohol use disorder, mild, abuse        Collateral Reports Completed:   Not Applicable    PLAN: (Patient Tasks / Therapist Tasks / Other)   Patient will practice acceptance with his 's struggle with aging        Rossy JamisonFadiAdama, LICSW                                                         ______________________________________________________________________    Treatment Plan    Patient's Name: Nick Montiel  YOB: 1970    Date: 1/6/2021    DSM5 Diagnoses: Substance-Related & Addictive Disorders Alcohol Use Disorder   305.00 (F10.10) Mild continued use  Psychosocial / Contextual Factors:  Individual Factors has had problems with alcohol use his adult lifetime, Family Factors Reports a negative relationship with mom and does not speak with dad.  His current partner is concerend about his alcohol use and his mood swings that cause anger,  and Economic Factors currently works as a  and is economically comfortable   WHODAS: TBD    Referral / Collaboration:  The following referral(s) will be initiated: Group support to limit [email or web address] or contact [facility]     Anticipated number of session or this episode of care: 10-20      MeasurableTreatment Goal(s) related to diagnosis / functional impairment(s)  Goal 1: Patient will not give up easily    I will know I've met my goal when I am finishing what I started, and completed goals.      Objective #A (Patient Action)    Patient will Identify negative self-talk and behaviors: challenge core beliefs, myths, and actions.  Status: Completed - Date: 1/6/2021      Intervention(s)  Therapist will assign homework to engage in cognitive restructuring  provide support to critically look at how negative beliefs keep you from moving forward .    Objective #B  Patient will identify core beliefs and the times in your life that originally created beliefs.  Status: Completed - Date: 1/6/2021     Intervention(s)  Therapist will assign homework to challenge core beliefs  provide educational materials on core beliefs and lifetime patterns  teach how to challenge core beliefs.    Objective #C  Patient will will be working towards his goals .  Status: Continued - Date(s): 1/6/2021    Intervention(s)  Therapist will assign homework to continue to use skills from therapy  provide support to negotiate what works and what patient continues to struggle with.      Goal 2: Patient will not be so negative     I will know I've met my goal when My  would tell me I am nicer, I would be able to define what it means to be a more positive person.      Objective #A (Patient Action)    Status: Completed - Date: 1/6/2021     Patient will identify feelings and emotions that occur prior to more negative behaviors.    Intervention(s)  Therapist will assign homework to track patient thoughts and feelings  provide educational materials on cognitive distortions  teach cognitive restructuring.    Objective #B  Patient will identify atleast 3 other responses that I can have other than negative.    Status: Continued - Date(s): 1/6/2021    Intervention(s)  Therapist will assign homework to practice other types of responses  provide educational materials on effective ways to communicate with others  role-play more effective ways to communicate with others.    Objective #C  Patient will learn and demonstrate 3 assertiveness skill(s).  Status: Continued - Date(s): 1/6/2021    Intervention(s)  Therapist will assign homework to use assertive communication  role-play assertiveness skills.      Goal 3: Patient  will find a hobby or interest    I will know I've met my goal when I go back to school and feel like I have things that are meaningful.      Objective #A (Patient Action)    Status: Completed - Date: 10/14/2020     Patient will Identify what he would like to do and steps that would get him there.    Intervention(s)  Therapist will assign homework to decide on steps to get towards goals  teach the client how to complete a 4-part pros and cons. to help with decision making.    Objective #B  Patient will identify barriers that keep him from moving forward with his goals.    Status: Continued - Date(s): 1/6/2021     Intervention(s)  Therapist will assign homework to note barriers   teach problem-sovling model.    Objective #C  Patient will engage in problem solving to help him move forward with his interst.  Status: Continued - Date(s): 1/6/2021     Intervention(s)  Therapist will assign homework to carry out possible solutions  provide support to gauge how problem-solving is going and other ways to look at issues.      Patient has reviewed and agreed to the above plan.      Rossy Barnes, LETICIA  January 6, 2021

## 2021-01-11 ENCOUNTER — OFFICE VISIT (OUTPATIENT)
Dept: NEUROSURGERY | Facility: CLINIC | Age: 51
End: 2021-01-11
Attending: INTERNAL MEDICINE
Payer: COMMERCIAL

## 2021-01-11 VITALS
BODY MASS INDEX: 28.41 KG/M2 | HEART RATE: 64 BPM | OXYGEN SATURATION: 98 % | WEIGHT: 181 LBS | DIASTOLIC BLOOD PRESSURE: 73 MMHG | SYSTOLIC BLOOD PRESSURE: 115 MMHG | HEIGHT: 67 IN

## 2021-01-11 DIAGNOSIS — R20.0 NUMBNESS AND TINGLING IN LEFT HAND: Primary | ICD-10-CM

## 2021-01-11 DIAGNOSIS — R20.2 NUMBNESS AND TINGLING IN LEFT HAND: Primary | ICD-10-CM

## 2021-01-11 PROCEDURE — 99203 OFFICE O/P NEW LOW 30 MIN: CPT | Performed by: PHYSICIAN ASSISTANT

## 2021-01-11 ASSESSMENT — MIFFLIN-ST. JEOR: SCORE: 1639.64

## 2021-01-11 ASSESSMENT — PAIN SCALES - GENERAL: PAINLEVEL: MILD PAIN (2)

## 2021-01-11 NOTE — LETTER
1/11/2021         RE: Nick Montiel  [address]  [place] [address]        Dear Colleague,    Thank you for referring your patient, Nick Montiel, to the [facility] [address]. Please see a copy of my visit note below.    Neurosurgery Consult    HPI    Mr. Tacho Montiel is a 50-year-old male referred to us for evaluation of neck pain and hand numbness and tingling.  Patient also has been dealing with frozen shoulder on the left working with sports medicine for this.  Is also been doing physical therapy.  He occasionally reports some aching in his right upper extremity as well.  But denies any numbness in his right upper extremity.  He denies any weakness in his left hand denies loss of dexterity in his fingers denies loss of coordination.  He states his symptoms have been present for at least 3 years.  He has not tried cervical traction.  He has a job that requires a lot of driving although he states he does not generally  rest his left elbow on the windowsill.        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      B/P: 115/73, T: Data Unavailable, P: 64, R: Data Unavailable       Exam    Alert and oriented no acute distress  Bilateral upper extremities with 5/5 strength  Celia sign negative  Reflexes 2+ triceps, biceps, brachioradialis    Bilateral lower extremities with 5/5 strength  Reflexes 2+ patella, absent  ankle  Negative straight leg raise bilaterally  Negative ankle clonus negative Babinski bilaterally  Gait is normal    Positive Tinel's sign in the left wrist, negative Tinel's in the left elbow, negative Phalen's    Imaging    Cervical x-ray does not demonstrate any fracture, demonstrates normal alignment, no significant degenerative disc disease is seen, mild to moderate facet  arthropathy.    Assessment    Neck pain  Left shoulder pain  Left hand tingling    Plan:      I recommend the patient began with a trial of cervical traction and also hand therapy and possible elbow pad and wrist brace for his left elbow and wrist to see if these interventions help with his symptoms.    If his symptoms or not improved with the above conservative therapies we would then obtain a left upper extremity EMG, and possibly a cervical MRI.  The patient does state that his symptoms are significant enough that if he had a surgical issue he would consider doing surgery for.  But first he would like to try conservative therapies and I do not feel he needs imaging at this point prior to initiating the above recommended therapies.  I communicated this to his physical therapist and he will follow-up with us in approximately 4 weeks if he is not improving.    Total time of 30 minutes spent with the patient today greater than 50% spent face to face in counseling and coordination of care.      Again, thank you for allowing me to participate in the care of your patient.        Sincerely,        Stas Santana PA-C

## 2021-01-11 NOTE — PROGRESS NOTES
Neurosurgery Consult    HPI    Mr. Tacho Montiel is a 50-year-old male referred to us for evaluation of neck pain and hand numbness and tingling.  Patient also has been dealing with frozen shoulder on the left working with sports medicine for this.  Is also been doing physical therapy.  He occasionally reports some aching in his right upper extremity as well.  But denies any numbness in his right upper extremity.  He denies any weakness in his left hand denies loss of dexterity in his fingers denies loss of coordination.  He states his symptoms have been present for at least 3 years.  He has not tried cervical traction.  He has a job that requires a lot of driving although he states he does not generally  rest his left elbow on the windowsill.        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      B/P: 115/73, T: Data Unavailable, P: 64, R: Data Unavailable       Exam    Alert and oriented no acute distress  Bilateral upper extremities with 5/5 strength  Celia sign negative  Reflexes 2+ triceps, biceps, brachioradialis    Bilateral lower extremities with 5/5 strength  Reflexes 2+ patella, absent  ankle  Negative straight leg raise bilaterally  Negative ankle clonus negative Babinski bilaterally  Gait is normal    Positive Tinel's sign in the left wrist, negative Tinel's in the left elbow, negative Phalen's    Imaging    Cervical x-ray does not demonstrate any fracture, demonstrates normal alignment, no significant degenerative disc disease is seen, mild to moderate facet arthropathy.    Assessment    Neck pain  Left shoulder pain  Left hand tingling    Plan:      I recommend the patient began with a trial of cervical traction and also hand therapy and possible elbow pad and wrist brace for his left elbow and wrist to see if these interventions help  with his symptoms.    If his symptoms or not improved with the above conservative therapies we would then obtain a left upper extremity EMG, and possibly a cervical MRI.  The patient does state that his symptoms are significant enough that if he had a surgical issue he would consider doing surgery for.  But first he would like to try conservative therapies and I do not feel he needs imaging at this point prior to initiating the above recommended therapies.  I communicated this to his physical therapist and he will follow-up with us in approximately 4 weeks if he is not improving.    Total time of 30 minutes spent with the patient today greater than 50% spent face to face in counseling and coordination of care.

## 2021-01-11 NOTE — NURSING NOTE
"January 11, 2021 10:16 AM   Nick Montiel is a 50 year old male who presents for:    Chief Complaint   Patient presents with     Consult     frozen shoulder      Initial Vitals: /73   Pulse 64   Ht 5' 7\" (1.702 m)   Wt 181 lb (82.1 kg)   SpO2 98%   BMI 28.35 kg/m   Estimated body mass index is 28.35 kg/m  as calculated from the following:    Height as of this encounter: 5' 7\" (1.702 m).    Weight as of this encounter: 181 lb (82.1 kg). Body surface area is 1.97 meters squared.  Mild Pain (2) Comment: Data Unavailable       Clinical concerns: Nick Montiel is here today for consult for frozen shoulder.    Arya Blanc MA  "

## 2021-01-13 ENCOUNTER — VIRTUAL VISIT (OUTPATIENT)
Dept: PSYCHOLOGY | Facility: CLINIC | Age: 51
End: 2021-01-13
Payer: COMMERCIAL

## 2021-01-13 DIAGNOSIS — F10.10 ALCOHOL USE DISORDER, MILD, ABUSE: Primary | ICD-10-CM

## 2021-01-13 PROCEDURE — 90834 PSYTX W PT 45 MINUTES: CPT | Mod: GT | Performed by: SOCIAL WORKER

## 2021-01-13 NOTE — PROGRESS NOTES
Progress Note    Patient Name: Nick Montiel  Date: 1/13/2021         Service Type: Individual      Session Start Time: 10:00  Session End Time: 10:45     Session Length: 52    Session #: 24    Attendees: Client attended alone    Service Modality:  Video Visit:    Telemedicine Visit: The patient's condition can be safely assessed and treated via synchronous audio and visual telemedicine encounter.      Reason for Telemedicine Visit: Services only offered telehealth    Originating Site (Patient Location): Patient's home    Distant Site (Provider Location): Provider Remote Setting    Consent:  The patient/guardian has verbally consented to: the potential risks and benefits of telemedicine (video visit) versus in person care; bill my insurance or make self-payment for services provided; and responsibility for payment of non-covered services.     Patient would like the video invitation sent by: Send to e-mail at: [email or web address]}     Mode of Communication:  Video Conference via [employer]    As the provider I attest to compliance with applicable laws and regulations related to telemedicine.     Treatment Plan Last Reviewed: 1/6/2021    DATA  Interactive Complexity: No  Crisis: No       Progress Since Last Session (Related to Symptoms / Goals / Homework):   Symptoms: Improving Patient reports to be manage stress better and not as frustrated in relationship    Homework: Achieved / completed to satisfaction      Episode of Care Goals: Achieved / completed to satisfaction - ACTION (Actively working towards change); Intervened by reinforcing change plan / affirming steps taken     Current / Ongoing Stressors and Concerns:   Patient reports being stressed about his health and that he has frozen shoulder in both shoulders.  Patient reports that stressors have led to more unhappiness in his relationship with .  Patient reports feeling irritable and easily snapping  when with his  and that this behavior contributes to relationship problems.  Patient also notes concerns that he does not have friends.      Treatment Objective(s) Addressed in This Session:   Problem-solve steps to engage in interest     Intervention:   Solution Focused: Discussed with patient his interest in having more friends.  Patient discussed how he had made friends in past cities that he has lived in.  He reports pandemic is making this harder, though he is engaging in bowling.  Patient discussed that an old friend had reached out and that he can make plans to meet with her in the end of this month or early next month.        ASSESSMENT: Current Emotional / Mental Status (status of significant symptoms):   Risk status (Self / Other harm or suicidal ideation)   Patient denies current fears or concerns for personal safety.   Patient denies current or recent suicidal ideation or behaviors.   Patient denies current or recent homicidal ideation or behaviors.   Patient denies current or recent self injurious behavior or ideation.   Patient denies other safety concerns.   Patient reports there has been no change in risk factors since their last session.     Patient reports there has been no change in protective factors since their last session.     Recommended that patient call 911 or go to the local ED should there be a change in any of these risk factors.     Appearance:   Appropriate    Eye Contact:   Good    Psychomotor Behavior: Normal    Attitude:   Cooperative  Interested   Orientation:   All   Speech    Rate / Production: Talkative    Volume:  Normal    Mood:    Normal   Affect:    Bright    Thought Content:  Clear  Rumination    Thought Form:  Coherent  Logical    Insight:    Fair      Medication Review:   No current psychiatric medications prescribed     Medication Compliance:   NA     Changes in Health Issues:   None reported     Chemical Use Review:   Substance Use: decrease in alcohol .  Patient  reports frequency of use only on weekends and was able to go one weekend without drinking.  Stage of Change: Action        Tobacco Use: No current tobacco use.      Diagnosis:  1. Alcohol use disorder, mild, abuse        Collateral Reports Completed:   Not Applicable    PLAN: (Patient Tasks / Therapist Tasks / Other)   Patient will reach out to friend and continue to make progress towards goals        Rossy LaraNorbert, LICSW                                                         ______________________________________________________________________    Treatment Plan    Patient's Name: Nick Montiel  YOB: 1970    Date: 1/6/2021    DSM5 Diagnoses: Substance-Related & Addictive Disorders Alcohol Use Disorder   305.00 (F10.10) Mild continued use  Psychosocial / Contextual Factors:  Individual Factors has had problems with alcohol use his adult lifetime, Family Factors Reports a negative relationship with mom and does not speak with dad.  His current partner is concerend about his alcohol use and his mood swings that cause anger,  and Economic Factors currently works as a  and is economically comfortable   WHODAS: TBD    Referral / Collaboration:  The following referral(s) will be initiated: Group support to limit [email or web address] or contact [facility]     Anticipated number of session or this episode of care: 10-20      MeasurableTreatment Goal(s) related to diagnosis / functional impairment(s)  Goal 1: Patient will not give up easily    I will know I've met my goal when I am finishing what I started, and completed goals.      Objective #A (Patient Action)    Patient will Identify negative self-talk and behaviors: challenge core beliefs, myths, and actions.  Status: Completed - Date: 1/6/2021     Intervention(s)  Therapist will assign homework to engage in cognitive restructuring  provide support to critically look at how negative beliefs keep you from moving  forward .    Objective #B  Patient will identify core beliefs and the times in your life that originally created beliefs.  Status: Completed - Date: 1/6/2021     Intervention(s)  Therapist will assign homework to challenge core beliefs  provide educational materials on core beliefs and lifetime patterns  teach how to challenge core beliefs.    Objective #C  Patient will will be working towards his goals .  Status: Continued - Date(s): 1/6/2021    Intervention(s)  Therapist will assign homework to continue to use skills from therapy  provide support to negotiate what works and what patient continues to struggle with.      Goal 2: Patient will not be so negative     I will know I've met my goal when My  would tell me I am nicer, I would be able to define what it means to be a more positive person.      Objective #A (Patient Action)    Status: Completed - Date: 1/6/2021     Patient will identify feelings and emotions that occur prior to more negative behaviors.    Intervention(s)  Therapist will assign homework to track patient thoughts and feelings  provide educational materials on cognitive distortions  teach cognitive restructuring.    Objective #B  Patient will identify atleast 3 other responses that I can have other than negative.    Status: Continued - Date(s): 1/6/2021    Intervention(s)  Therapist will assign homework to practice other types of responses  provide educational materials on effective ways to communicate with others  role-play more effective ways to communicate with others.    Objective #C  Patient will learn and demonstrate 3 assertiveness skill(s).  Status: Continued - Date(s): 1/6/2021    Intervention(s)  Therapist will assign homework to use assertive communication  role-play assertiveness skills.      Goal 3: Patient will find a hobby or interest    I will know I've met my goal when I go back to school and feel like I have things that are meaningful.      Objective #A (Patient  Action)    Status: Completed - Date: 10/14/2020     Patient will Identify what he would like to do and steps that would get him there.    Intervention(s)  Therapist will assign homework to decide on steps to get towards goals  teach the client how to complete a 4-part pros and cons. to help with decision making.    Objective #B  Patient will identify barriers that keep him from moving forward with his goals.    Status: Continued - Date(s): 1/6/2021     Intervention(s)  Therapist will assign homework to note barriers   teach problem-sovling model.    Objective #C  Patient will engage in problem solving to help him move forward with his interst.  Status: Continued - Date(s): 1/6/2021     Intervention(s)  Therapist will assign homework to carry out possible solutions  provide support to gauge how problem-solving is going and other ways to look at issues.      Patient has reviewed and agreed to the above plan.      Rossy Barnes, [facility]  January 6, 2021

## 2021-01-20 ENCOUNTER — VIRTUAL VISIT (OUTPATIENT)
Dept: PSYCHOLOGY | Facility: CLINIC | Age: 51
End: 2021-01-20
Payer: COMMERCIAL

## 2021-01-20 DIAGNOSIS — F10.10 ALCOHOL USE DISORDER, MILD, ABUSE: Primary | ICD-10-CM

## 2021-01-20 PROCEDURE — 90834 PSYTX W PT 45 MINUTES: CPT | Mod: GT | Performed by: SOCIAL WORKER

## 2021-01-20 NOTE — PROGRESS NOTES
Progress Note    Patient Name: Nick Montiel  Date: 1/20/2021         Service Type: Individual      Session Start Time: 15:30  Session End Time: 16:15     Session Length: 45    Session #: 25    Attendees: Client attended alone    Service Modality:  Video Visit:    Telemedicine Visit: The patient's condition can be safely assessed and treated via synchronous audio and visual telemedicine encounter.      Reason for Telemedicine Visit: Services only offered telehealth    Originating Site (Patient Location): Patient's home    Distant Site (Provider Location): Provider Remote Setting    Consent:  The patient/guardian has verbally consented to: the potential risks and benefits of telemedicine (video visit) versus in person care; bill my insurance or make self-payment for services provided; and responsibility for payment of non-covered services.     Patient would like the video invitation sent by: Send to e-mail at: [email or web address]}     Mode of Communication:  Video Conference via [employer]    As the provider I attest to compliance with applicable laws and regulations related to telemedicine.     Treatment Plan Last Reviewed: 1/6/2021    DATA  Interactive Complexity: No  Crisis: No       Progress Since Last Session (Related to Symptoms / Goals / Homework):   Symptoms: No change Patient noting increased stress and more able to acknowledge what he can do for himself     Homework: Achieved / completed to satisfaction      Episode of Care Goals: Achieved / completed to satisfaction - ACTION (Actively working towards change); Intervened by reinforcing change plan / affirming steps taken     Current / Ongoing Stressors and Concerns:   Patient reports being stressed about his health and that he has frozen shoulder in both shoulders.  Patient reports that stressors have led to more unhappiness in his relationship with .  Patient reports feeling irritable and easily  snapping when with his  and that this behavior contributes to relationship problems.  Patient also notes concerns that he does not have friends.      Treatment Objective(s) Addressed in This Session:   Increase interest, engagement, and pleasure in doing things  learn & utilize at least 1 assertive communication skills weekly  Psychoeduction on personality disorders     Intervention:   Solution Focused: Patient discussed plans to see a friend.  Patient discussed fight with  and could acknowledge how he could have communicated better with .  Patient identified plans to engage in sewing shirts as a way to engage in a hobby and reduce stress.  Discussed with patient his concerns that he has narcissistic personality disorder.  Discussed with patient how it is normative for us all to experience personality disorder symptoms without having a personality disorder.         ASSESSMENT: Current Emotional / Mental Status (status of significant symptoms):   Risk status (Self / Other harm or suicidal ideation)   Patient denies current fears or concerns for personal safety.   Patient denies current or recent suicidal ideation or behaviors.   Patient denies current or recent homicidal ideation or behaviors.   Patient denies current or recent self injurious behavior or ideation.   Patient denies other safety concerns.   Patient reports there has been no change in risk factors since their last session.     Patient reports there has been no change in protective factors since their last session.     Recommended that patient call 911 or go to the local ED should there be a change in any of these risk factors.     Appearance:   Appropriate    Eye Contact:   Good    Psychomotor Behavior: Normal    Attitude:   Cooperative  Interested   Orientation:   All   Speech    Rate / Production: Talkative    Volume:  Normal    Mood:    Normal   Affect:    Bright    Thought Content:  Clear  Rumination    Thought Form:  Coherent   Logical    Insight:    Fair      Medication Review:   No current psychiatric medications prescribed     Medication Compliance:   NA     Changes in Health Issues:   None reported     Chemical Use Review:   Substance Use: increase in alcohol .  Patient reports frequency of use has been drinking more due to special occassions.  Stage of Change: Preparatory and Action  Discussed with patient at what point of drinking should there be concern about his drinking.        Tobacco Use: No current tobacco use.      Diagnosis:  1. Alcohol use disorder, mild, abuse        Collateral Reports Completed:   Not Applicable    PLAN: (Patient Tasks / Therapist Tasks / Other)   Patient will reach out to friend and continue to make progress towards goals        LETICIA Naqvi                                                         ______________________________________________________________________    Treatment Plan    Patient's Name: Nick Montiel  YOB: 1970    Date: 1/6/2021    DSM5 Diagnoses: Substance-Related & Addictive Disorders Alcohol Use Disorder   305.00 (F10.10) Mild continued use  Psychosocial / Contextual Factors:  Individual Factors has had problems with alcohol use his adult lifetime, Family Factors Reports a negative relationship with mom and does not speak with dad.  His current partner is concerend about his alcohol use and his mood swings that cause anger,  and Economic Factors currently works as a  and is economically comfortable   WHODAS: TBD    Referral / Collaboration:  The following referral(s) will be initiated: Group support to limit alcohol [email or web address] or contact [facility]     Anticipated number of session or this episode of care: 10-20      MeasurableTreatment Goal(s) related to diagnosis / functional impairment(s)  Goal 1: Patient will not give up easily    I will know I've met my goal when I am finishing what I started, and completed  goals.      Objective #A (Patient Action)    Patient will Identify negative self-talk and behaviors: challenge core beliefs, myths, and actions.  Status: Completed - Date: 1/6/2021     Intervention(s)  Therapist will assign homework to engage in cognitive restructuring  provide support to critically look at how negative beliefs keep you from moving forward .    Objective #B  Patient will identify core beliefs and the times in your life that originally created beliefs.  Status: Completed - Date: 1/6/2021     Intervention(s)  Therapist will assign homework to challenge core beliefs  provide educational materials on core beliefs and lifetime patterns  teach how to challenge core beliefs.    Objective #C  Patient will will be working towards his goals .  Status: Continued - Date(s): 1/6/2021    Intervention(s)  Therapist will assign homework to continue to use skills from therapy  provide support to negotiate what works and what patient continues to struggle with.      Goal 2: Patient will not be so negative     I will know I've met my goal when My  would tell me I am nicer, I would be able to define what it means to be a more positive person.      Objective #A (Patient Action)    Status: Completed - Date: 1/6/2021     Patient will identify feelings and emotions that occur prior to more negative behaviors.    Intervention(s)  Therapist will assign homework to track patient thoughts and feelings  provide educational materials on cognitive distortions  teach cognitive restructuring.    Objective #B  Patient will identify atleast 3 other responses that I can have other than negative.    Status: Continued - Date(s): 1/6/2021    Intervention(s)  Therapist will assign homework to practice other types of responses  provide educational materials on effective ways to communicate with others  role-play more effective ways to communicate with others.    Objective #C  Patient will learn and demonstrate 3 assertiveness  skill(s).  Status: Continued - Date(s): 1/6/2021    Intervention(s)  Therapist will assign homework to use assertive communication  role-play assertiveness skills.      Goal 3: Patient will find a hobby or interest    I will know I've met my goal when I go back to school and feel like I have things that are meaningful.      Objective #A (Patient Action)    Status: Completed - Date: 10/14/2020     Patient will Identify what he would like to do and steps that would get him there.    Intervention(s)  Therapist will assign homework to decide on steps to get towards goals  teach the client how to complete a 4-part pros and cons. to help with decision making.    Objective #B  Patient will identify barriers that keep him from moving forward with his goals.    Status: Continued - Date(s): 1/6/2021     Intervention(s)  Therapist will assign homework to note barriers   teach problem-sovling model.    Objective #C  Patient will engage in problem solving to help him move forward with his interst.  Status: Continued - Date(s): 1/6/2021     Intervention(s)  Therapist will assign homework to carry out possible solutions  provide support to gauge how problem-solving is going and other ways to look at issues.      Patient has reviewed and agreed to the above plan.      Rossy Barnes, [facility]  January 6, 2021

## 2021-01-21 NOTE — PROGRESS NOTES
Hand Therapy Initial Evaluation  Current Date:  1/25/2021    Diagnosis: Left Hand Numbness/Tingling  DOI: MD order 1/11/2021  Post:  2 years ago    Precautions: B Frozen Shoulder    Subjective:  Nick Montiel is a 50 year old male.    Patient reports symptoms of the left hand which occurred due to unsure, patient endorses onset of numbness/tingling over 2 years ago without known aggravating event. Since onset symptoms are recently improving.  General health as reported by patient is good.  Pertinent medical history includes:Asthma  Medical allergies:Penicillin.  Surgical history: orthopedic: R Elbow/distal humerus.  Medication history: None.    Current occupation is Realtor  Job Tasks: Computer Work, Driving, Prolonged Sitting    Occupational Profile Information:  Right hand dominant  Prior functional level:  no limitations  Patient reports symptoms of numbness and tingling   Special tests:  none, however EMG will be ordered if symptoms do not improve.    Previous treatment: none, seeing PT for bilateral shoulders and has order for cervical traction  Barriers include:none  Mobility: No difficulty  Transportation: drives  Currently working in normal job without restrictions  Leisure activities/hobbies: bowling, patient has small dog  Other: Lives with spouse    Functional Outcome Measure:   Upper Extremity Functional Index Score:  SCORE:   Column Totals: /80: 69   (A lower score indicates greater disability.)    Objective:    Numbness and Tingling (Scale 0-10):   1/25/21   At Best 0   At Worst 3     Pain Description:  Date 1/25/21   Location Left hand (IF-SF), volar wrist and ulnar forearm   Pain Quality Numbness and tingling, heavy feeling in arm at times   Frequency intermittent   Pain is worst Daytime, morning   Exacerbated by Unsure, patient reports sleeping position is aggravating but daytime tasks are unpredictable   Relieved by Position change, unsure   Progression Recently improving     ROM  Pain  Report: - none  + mild    ++ moderate    +++ severe   Elbow 1/25/21 1/25/21   AROM (PROM) Right* Left   Extension 0 0   Flexion 115 140   Supination 75 80   Pronation 70 75   * History of distal humerus fracture that limits elbow flexion  ROM  Pain Report: - none  + mild    ++ moderate    +++ severe   Wrist 1/25/21 1/25/21   AROM (PROM) Right Left   Extension 71 71   Flexion 56 56   RD 20 20   UD 25 25     Strength   (Measured in pounds)  Pain Report: - none  + mild    ++ moderate    +++ severe    1/25/21 1/25/21   Trials Right Left   1  2  3 58 50   Average 58 50     Lat Pinch 1/25/21 1/25/21   Trials Right Left   1  2  3 18 13   Average 18 13     3 Pt Pinch 1/25/21 1/25/21   Trials Right Left   1  2  3 12 11   Average 12 11     Edema:  None    Scar/Wound:  N/A    Sensation: Patient endorses numbness/tingling in IF through SF and through palm of left hand    Special Tests:   Date 1/25/21   Side Left   Tinels CT +   Tinels PIN -   Tinels DSRN -   Tinels cubital tunnel +   Tinels [place] -   Phalens + 60 sec fingertips   Elbow Flexion -     Cervical Screen  Pain Report:  - none    + mild    ++ moderate    +++ severe      Active with Gentle Overpressure 1/25/2021   Flexion -   Extension -   Lateral Flexion Right -   Lateral Flexion Left -   Rotation Right -   Rotation Left -   Compression NT   Traction NT     Assessment:  Patient presents with symptoms consistent with diagnosis as listed above with conservative intervention. Symptoms difficult to provoke in clinic, but per therapist evaluation patient is having median nerve and ulnar nerve compression. Can not rule out cervical involvement, patient has order for PT as well.    Patient's limitations or Problem List includes:  Sensory disturbance of the left index finger, long finger, ring finger, small finger and hand which interferes with the patient's ability to perform Sleep Patterns, Recreational Activities and Household Chores as compared to previous  level of function.    Rehab Potential:  Excellent - Return to full activity, no limitations    Patient will benefit from skilled Occupational Therapy to increase sensation to return to previous activity level and resume normal daily tasks and to reach their rehab potential.    Barriers to Learning:  No barrier    Communication Issues:  Patient appears to be able to clearly communicate and understand verbal and written communication and follow directions correctly.    Chart Review: Chart Review and Detailed history review with patient    Identified Performance Deficits: sleep and leisure activities    Assessment of Occupational Performance:  1-3 Performance Deficits    Clinical Decision Making (Complexity): Low complexity    Treatment Explanation:  The following has been discussed with the patient:  RX ordered/plan of care  Anticipated outcomes  Possible risks and side effects    Plan:  Frequency:  2 X a month, once daily  Duration:  for 2 months    Treatment Plan:  Modalities:  Laser Light  Therapeutic Exercise:  AROM, AAROM, PROM and Tendon Gliding  Neuromuscular re-education:  Nerve Gliding and Kinesiotaping  Manual Techniques:  Myofascial release  Orthotic Fabrication:  Forearm based orthosis, elbow orthosis  Self Care:  Self Care Tasks and Ergonomic Considerations    Home Program:  Carpal tunnel syndrome precautions  OTC Wrist Cock up for night and day per symptoms  Tendon gliding fist series  Distal MN glides    Next visit:   Check response to splint/HEP  Patient observing which fingers go numb/tingling when they are bothering him to help narrow down symptoms  If not improving with median nerve exercises, fabricate soft elbow flexion block and initiate UN exercises    Discharge Plan:  Achieve all LTG.  Independent in home treatment program.  Reach maximal therapeutic benefit.    Please see daily flow sheet for treatment and 1:1 time provided today.

## 2021-01-22 ENCOUNTER — OFFICE VISIT (OUTPATIENT)
Dept: FAMILY MEDICINE | Facility: CLINIC | Age: 51
End: 2021-01-22
Payer: COMMERCIAL

## 2021-01-22 VITALS
DIASTOLIC BLOOD PRESSURE: 72 MMHG | WEIGHT: 180.8 LBS | SYSTOLIC BLOOD PRESSURE: 120 MMHG | BODY MASS INDEX: 28.32 KG/M2 | OXYGEN SATURATION: 96 % | HEART RATE: 67 BPM | TEMPERATURE: 97.7 F

## 2021-01-22 DIAGNOSIS — E78.00 HYPERCHOLESTEROLEMIA: ICD-10-CM

## 2021-01-22 DIAGNOSIS — R07.89 LEFT-SIDED CHEST WALL PAIN: Primary | ICD-10-CM

## 2021-01-22 LAB
CHOLEST SERPL-MCNC: 182 MG/DL
HDLC SERPL-MCNC: 48 MG/DL
LDLC SERPL CALC-MCNC: 110 MG/DL
NONHDLC SERPL-MCNC: 134 MG/DL
TRIGL SERPL-MCNC: 119 MG/DL

## 2021-01-22 PROCEDURE — 99213 OFFICE O/P EST LOW 20 MIN: CPT | Performed by: INTERNAL MEDICINE

## 2021-01-22 PROCEDURE — 36415 COLL VENOUS BLD VENIPUNCTURE: CPT | Performed by: INTERNAL MEDICINE

## 2021-01-22 PROCEDURE — 80061 LIPID PANEL: CPT | Performed by: INTERNAL MEDICINE

## 2021-01-22 ASSESSMENT — ENCOUNTER SYMPTOMS
NAUSEA: 0
COUGH: 0
BACK PAIN: 0
ABDOMINAL PAIN: 0
COLOR CHANGE: 0
WOUND: 0
VOMITING: 0
SHORTNESS OF BREATH: 0

## 2021-01-22 NOTE — PROGRESS NOTES
"  Assessment & Plan     Left-sided chest wall pain  Consistent with musculoskeletal etiology, no GI or CV/resp symptoms. Discussed using ice/heat, stretching, and concurrent tylenol and ibuprofen use. Discussed using salon pas lidocaine patch to help with acute pain - patient reports he has some at home. Discussed using a blanket/pillow to support elbow when sleeping. Of note, for his neck he is also being seen by spine who has ordered hand therapy and traction.     Hypercholesterolemia  Due for repeat labs after we started crestor in November. Will get while he's here today   - Lipid panel reflex to direct LDL Fasting        Return in about 6 months (around 7/22/2021) for Physical Exam.    Babs Dumas MD  [facility] RANDA Romero is a 50 year old who presents to clinic today for the following health issues: Side pain    HPI   Patient reports intermittent left sided pain over the lower anterior ribs over the past several weeks. Noticed that it started after he finished PT for his shoulders. Reports it is a  fluctuating stabbing 4/10 pain that can worsen with palpation, twisting, ie. \"when getting in and out of my car\". Pain is not exacerbated with breathing and is not always tender to touch.  Reports he has difficulty finding a good sleeping position due to his elbows resting on the tender spot while he sleeps. Has tried ibuprofen and tylenol with some pain relief. Denies nausea/vomiting, shortness of breath, chest pain, recent illnesses/cough.       Concern - side pain by ribs on left  Onset: 1 1/2 months but worse the last 2 days  Description: stabbing, muscle?   Intensity: moderate  Progression of Symptoms:  worsening and same  Accompanying Signs & Symptoms: movement makes it worse  Previous history of similar problem: none  Precipitating factors:        Worsened by: in the morning  Alleviating factors:        Improved by: none  Therapies tried and outcome: "  none         Review of Systems   Respiratory: Negative for cough and shortness of breath.    Cardiovascular: Negative for chest pain.   Gastrointestinal: Negative for abdominal pain, nausea and vomiting.   Musculoskeletal: Negative for back pain.   Skin: Negative for color change, rash and wound.            Objective    /72 (Cuff Size: Adult Large)   Pulse 67   Temp 97.7  F (36.5  C) (Tympanic)   Wt 82 kg (180 lb 12.8 oz)   SpO2 96%   BMI 28.32 kg/m    Body mass index is 28.32 kg/m .  Gen: well appearing, pleasant man, no distress  Pulm: breathing comfortably, CTAB, no wheezes or rales  CV: RRR, normal S1 and S2, no murmurs  Abd: BS present, soft, nontender, nondistended  MSK: mild point tenderness lower left anterior ribs.                       Babs Dumas MD on 1/22/2021 at 12:44 PM

## 2021-01-25 ENCOUNTER — THERAPY VISIT (OUTPATIENT)
Dept: OCCUPATIONAL THERAPY | Facility: CLINIC | Age: 51
End: 2021-01-25
Attending: PHYSICIAN ASSISTANT
Payer: COMMERCIAL

## 2021-01-25 DIAGNOSIS — R20.2 NUMBNESS AND TINGLING IN LEFT HAND: ICD-10-CM

## 2021-01-25 DIAGNOSIS — R20.0 NUMBNESS AND TINGLING IN LEFT HAND: ICD-10-CM

## 2021-01-25 PROCEDURE — 97165 OT EVAL LOW COMPLEX 30 MIN: CPT | Mod: GO | Performed by: OCCUPATIONAL THERAPIST

## 2021-01-25 PROCEDURE — 97112 NEUROMUSCULAR REEDUCATION: CPT | Mod: GO | Performed by: OCCUPATIONAL THERAPIST

## 2021-01-27 ENCOUNTER — VIRTUAL VISIT (OUTPATIENT)
Dept: PSYCHOLOGY | Facility: CLINIC | Age: 51
End: 2021-01-27
Payer: COMMERCIAL

## 2021-01-27 DIAGNOSIS — F10.10 ALCOHOL USE DISORDER, MILD, ABUSE: Primary | ICD-10-CM

## 2021-01-27 PROCEDURE — 90834 PSYTX W PT 45 MINUTES: CPT | Mod: GT | Performed by: SOCIAL WORKER

## 2021-01-27 NOTE — PROGRESS NOTES
Progress Note    Patient Name: Nick Montiel  Date: 1/27/2021         Service Type: Individual      Session Start Time: 11:00  Session End Time: 11:51     Session Length: 51    Session #: 26    Attendees: Client attended alone    Service Modality:  Video Visit:    Telemedicine Visit: The patient's condition can be safely assessed and treated via synchronous audio and visual telemedicine encounter.      Reason for Telemedicine Visit: Services only offered telehealth    Originating Site (Patient Location): Patient's home    Distant Site (Provider Location): Provider Remote Setting    Consent:  The patient/guardian has verbally consented to: the potential risks and benefits of telemedicine (video visit) versus in person care; bill my insurance or make self-payment for services provided; and responsibility for payment of non-covered services.     Patient would like the video invitation sent by: Send to e-mail at: [email or web address]     Mode of Communication:  Video Conference via [employer]    As the provider I attest to compliance with applicable laws and regulations related to telemedicine.     Treatment Plan Last Reviewed: 1/6/2021    DATA  Interactive Complexity: No  Crisis: No       Progress Since Last Session (Related to Symptoms / Goals / Homework):   Symptoms: Patient improving in ways that he is managing stress, communication with ; patient reporting increase in alcohol use.    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  Patient reports that stressors have led to more unhappiness in his relationship with .  Patient reports  feeling irritable and easily snapping when with his  and that this behavior contributes to relationship problems.  Patient also notes concerns that he does not have friends.      Treatment Objective(s) Addressed in This Session:   Patient will 2-3 identify triggers for use     Intervention:   Solution Focused: Patient identified increase in drinking as an issue.  He noted not being more stressed, but that he went to liquor store to look for the product.  Inquired with patient if the liquor store could be a trigger for drinking.  Patient agreed that you do not go to a liquor store unless you are buying alcohol and stated that going to a grocery store with a liquor store near it is also a trigger for him.  Patient stated plan to want to wait until Saturday to go to the liquor store and only keep 6 cans in his house.  He does identify that Wednesdays is a day he drinks when bowling.        ASSESSMENT: Current Emotional / Mental Status (status of significant symptoms):   Risk status (Self / Other harm or suicidal ideation)   Patient denies current fears or concerns for personal safety.   Patient denies current or recent suicidal ideation or behaviors.   Patient denies current or recent homicidal ideation or behaviors.   Patient denies current or recent self injurious behavior or ideation.   Patient denies other safety concerns.   Patient reports there has been no change in risk factors since their last session.     Patient reports there has been no change in protective factors since their last session.     Recommended that patient call 911 or go to the local ED should there be a change in any of these risk factors.     Appearance:   Appropriate    Eye Contact:   Good    Psychomotor Behavior: Normal    Attitude:   Cooperative  Interested   Orientation:   All   Speech    Rate / Production: Talkative    Volume:  Normal    Mood:    Normal   Affect:    Bright    Thought Content:  Clear  Rumination    Thought  Form:  Coherent  Logical    Insight:    Fair      Medication Review:   No current psychiatric medications prescribed     Medication Compliance:   NA     Changes in Health Issues:   None reported     Chemical Use Review:   Substance Use: Problem use continues with no change since last session, Stage of Change: Preparatory and Action  Provided support and affirmation for steps taken towards sobriety   Facilitated behavior chain analysis        Tobacco Use: No current tobacco use.      Diagnosis:  1. Alcohol use disorder, mild, abuse        Collateral Reports Completed:   Not Applicable    PLAN: (Patient Tasks / Therapist Tasks / Other)   Keeping alcohol use to Saturday and Sunday and having 6 drinks in the house at a time.        Rossy Barnes, ANNAMARIESW                                                         ______________________________________________________________________    Treatment Plan    Patient's Name: Nick Montiel  YOB: 1970    Date: 1/6/2021    DSM5 Diagnoses: Substance-Related & Addictive Disorders Alcohol Use Disorder   305.00 (F10.10) Mild continued use  Psychosocial / Contextual Factors:  Individual Factors has had problems with alcohol use his adult lifetime, Family Factors Reports a negative relationship with mom and does not speak with dad.  His current partner is concerend about his alcohol use and his mood swings that cause anger,  and Economic Factors currently works as a  and is economically comfortable   WHODAS: TBD    Referral / Collaboration:  The following referral(s) will be initiated: Group support to limit [email or web address] or contact Daphne Chen     Anticipated number of session or this episode of care: 10-20      MeasurableTreatment Goal(s) related to diagnosis / functional impairment(s)  Goal 1: Patient will not give up easily    I will know I've met my goal when I am finishing what I started, and completed goals.       Objective #A (Patient Action)    Patient will Identify negative self-talk and behaviors: challenge core beliefs, myths, and actions.  Status: Completed - Date: 1/6/2021     Intervention(s)  Therapist will assign homework to engage in cognitive restructuring  provide support to critically look at how negative beliefs keep you from moving forward .    Objective #B  Patient will identify core beliefs and the times in your life that originally created beliefs.  Status: Completed - Date: 1/6/2021     Intervention(s)  Therapist will assign homework to challenge core beliefs  provide educational materials on core beliefs and lifetime patterns  teach how to challenge core beliefs.    Objective #C  Patient will will be working towards his goals .  Status: Continued - Date(s): 1/6/2021    Intervention(s)  Therapist will assign homework to continue to use skills from therapy  provide support to negotiate what works and what patient continues to struggle with.      Goal 2: Patient will not be so negative     I will know I've met my goal when My  would tell me I am nicer, I would be able to define what it means to be a more positive person.      Objective #A (Patient Action)    Status: Completed - Date: 1/6/2021     Patient will identify feelings and emotions that occur prior to more negative behaviors.    Intervention(s)  Therapist will assign homework to track patient thoughts and feelings  provide educational materials on cognitive distortions  teach cognitive restructuring.    Objective #B  Patient will identify atleast 3 other responses that I can have other than negative.    Status: Continued - Date(s): 1/6/2021    Intervention(s)  Therapist will assign homework to practice other types of responses  provide educational materials on effective ways to communicate with others  role-play more effective ways to communicate with others.    Objective #C  Patient will learn and demonstrate 3 assertiveness  skill(s).  Status: Continued - Date(s): 1/6/2021    Intervention(s)  Therapist will assign homework to use assertive communication  role-play assertiveness skills.      Goal 3: Patient will find a hobby or interest    I will know I've met my goal when I go back to school and feel like I have things that are meaningful.      Objective #A (Patient Action)    Status: Completed - Date: 10/14/2020     Patient will Identify what he would like to do and steps that would get him there.    Intervention(s)  Therapist will assign homework to decide on steps to get towards goals  teach the client how to complete a 4-part pros and cons. to help with decision making.    Objective #B  Patient will identify barriers that keep him from moving forward with his goals.    Status: Continued - Date(s): 1/6/2021     Intervention(s)  Therapist will assign homework to note barriers   teach problem-sovling model.    Objective #C  Patient will engage in problem solving to help him move forward with his interst.  Status: Continued - Date(s): 1/6/2021     Intervention(s)  Therapist will assign homework to carry out possible solutions  provide support to gauge how problem-solving is going and other ways to look at issues.      Patient has reviewed and agreed to the above plan.      Rossy Barnes, [facility]  January 6, 2021

## 2021-02-02 ENCOUNTER — THERAPY VISIT (OUTPATIENT)
Dept: PHYSICAL THERAPY | Facility: CLINIC | Age: 51
End: 2021-02-02
Payer: COMMERCIAL

## 2021-02-02 DIAGNOSIS — R20.2 NUMBNESS AND TINGLING IN LEFT HAND: ICD-10-CM

## 2021-02-02 DIAGNOSIS — R20.0 NUMBNESS AND TINGLING IN LEFT HAND: ICD-10-CM

## 2021-02-02 DIAGNOSIS — M54.12 CERVICAL RADICULOPATHY: ICD-10-CM

## 2021-02-02 PROCEDURE — 97161 PT EVAL LOW COMPLEX 20 MIN: CPT | Mod: GP | Performed by: PHYSICAL THERAPIST

## 2021-02-02 PROCEDURE — 97012 MECHANICAL TRACTION THERAPY: CPT | Mod: GP | Performed by: PHYSICAL THERAPIST

## 2021-02-02 NOTE — PROGRESS NOTES
"[facility] Initial Evaluation  Subjective:  The history is provided by the patient. No  was used.   Therapist Generated HPI Evaluation  Problem details:   Patient had insidious onset of tingling and numbness left hand digits 3,4,5 about 3 years ago.  Tingling is intermittent, numbness is constant.  He has intermittent \"twitching\" left upper arm, intermittent \"heaviness\" left upper arm, and has recent unrelated intermittent left lateral thoracic pain.  Patient was seen in PT at this clinic late 2020 through December for bilateral shoulder pain which developed summer 2020 with slow progress, and cervical/thoracic treatment was included as well.  He had cortisone injection bilateral shoulders recently which helped mobility left shoulder and pain in the right shoulder.    Currently, thoracic symptoms are present typically upon waking in right sidelying. Tingling in the hand is intermittent, rates 0-6/10, no longer daily since using brace given by OT, but light numbness to digits 3,4,5 remains constant.  He denies cervical pain or shoulder pain presently (since shoulder injections), but still has restriction of movement both shoulders. Hand tingling symptoms increase with nothing in particular.  Symptoms decrease with wearing brace (wears all night as prescribed).  He is doing hand exercises prescribed by OT.      General health as reported by patient is excellent.  Pertinent medical history includes: asthma (high cholesterol).   Red flags:  None as reported by patient.  Other medical allergies details: penicillin.   Surgeries include: ORIF distal right humerus ~1995 with continued elbow flexion restriction.    Current medications: singulair, OTC Aleve.       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                                                        Objective:  Standing Alignment:    Cervical/Thoracic:  Forward head and thoracic kyphosis increased  Shoulder/UE:  Rounded " shoulders                                  Cervical/Thoracic Evaluation    AROM:  AROM Cervical:    Flexion:          WNL  Extension:       66%  Rotation:         Left: 75%     Right: WNL  Side Bend:      Left:     Right:                                Shoulder Evaluation:  ROM:  AROM:    Flexion:  Left:  94    Right:  118    Abduction:  Left: 116   Right:  99            Elbow Flexion:  Left:  WNL      Elbow Extension:  Left:  WNL       Extension/Internal Rotation:  Left:  L4    Right:  L4                                                 Correction of sitting posture:  No effect  Seated cervical retraction with pt overpressure:  No effect      Assessment/Plan:    Patient is a 51 year old male with cervical complaints.    Patient has the following significant findings with corresponding treatment plan.                Diagnosis 1:  Cervical pain with numbness/tingling left hand  Numbness/tingling:  Cervical traction, therapeutic exercise    Decreased ROM/flexibility - manual therapy, therapeutic exercise and home program  Decreased function - therapeutic activities and home program  Impaired posture - neuro re-education and home program    Cumulative Therapy Evaluation is: Low complexity.    Previous and current functional limitations:  (See Goal Flow Sheet for this information)    Short term and Long term goals: (See Goal Flow Sheet for this information)     Communication ability:  Patient appears to be able to clearly communicate and understand verbal and written communication and follow directions correctly.  Treatment Explanation - The following has been discussed with the patient:   RX ordered/plan of care  Anticipated outcomes  Possible risks and side effects  This patient would benefit from PT intervention to resume normal activities.   Rehab potential is fair.    Frequency:  1 X week, once daily  Duration:  for 6 weeks  Discharge Plan:  Achieve all LTG.  Independent in home treatment program.  Reach maximal  therapeutic benefit.    Please refer to the daily flowsheet for treatment today, total treatment time and time spent performing 1:1 timed codes.

## 2021-02-03 PROBLEM — M54.12 CERVICAL RADICULOPATHY: Status: ACTIVE | Noted: 2021-02-03

## 2021-02-04 NOTE — PROGRESS NOTES
SOAP Note Objective Information for 2/8/2021:    Numbness and Tingling (Scale 0-10):   1/25/21 2/8/21   At Best 0 0   At Worst 3 0     Pain Description:  Date 1/25/21   Location Left hand (IF-SF), volar wrist and ulnar forearm   Pain Quality Numbness and tingling, heavy feeling in arm at times   Frequency intermittent   Pain is worst Daytime, morning   Exacerbated by Unsure, patient reports sleeping position is aggravating but daytime tasks are unpredictable   Relieved by Position change, unsure   Progression Recently improving     Sensation:   1/25/21 - Patient endorses numbness/tingling in IF through SF and through palm of left hand   2/8/21 - Resolving symptoms since splint use and exercises    Special Tests:   Date 1/25/21   Side Left   Tinels CT +   Tinels PIN -   Tinels DSRN -   Tinels cubital tunnel +   Tinels [place] -   Phalens + 60 sec fingertips   Elbow Flexion -     Home Program:  Carpal tunnel syndrome precautions  OTC Wrist Cock up for night and day per symptoms  Tendon gliding fist series  Distal MN glides  Median/Ulnar nerve mobility     Next visit:   In one month, discharge to [facility] if doing well    Please see daily flow sheet for treatment and 1:1 time provided today.

## 2021-02-08 ENCOUNTER — THERAPY VISIT (OUTPATIENT)
Dept: OCCUPATIONAL THERAPY | Facility: CLINIC | Age: 51
End: 2021-02-08
Payer: COMMERCIAL

## 2021-02-08 DIAGNOSIS — R20.0 NUMBNESS AND TINGLING IN LEFT HAND: ICD-10-CM

## 2021-02-08 DIAGNOSIS — R20.2 NUMBNESS AND TINGLING IN LEFT HAND: ICD-10-CM

## 2021-02-08 PROCEDURE — 97112 NEUROMUSCULAR REEDUCATION: CPT | Mod: GO | Performed by: OCCUPATIONAL THERAPIST

## 2021-02-09 ENCOUNTER — THERAPY VISIT (OUTPATIENT)
Dept: PHYSICAL THERAPY | Facility: CLINIC | Age: 51
End: 2021-02-09
Payer: COMMERCIAL

## 2021-02-09 DIAGNOSIS — M54.12 CERVICAL RADICULOPATHY: ICD-10-CM

## 2021-02-09 PROCEDURE — 97140 MANUAL THERAPY 1/> REGIONS: CPT | Mod: GP | Performed by: PHYSICAL THERAPIST

## 2021-02-09 PROCEDURE — 97012 MECHANICAL TRACTION THERAPY: CPT | Mod: GP | Performed by: PHYSICAL THERAPIST

## 2021-02-11 ENCOUNTER — VIRTUAL VISIT (OUTPATIENT)
Dept: PSYCHOLOGY | Facility: CLINIC | Age: 51
End: 2021-02-11
Payer: COMMERCIAL

## 2021-02-11 DIAGNOSIS — F10.10 ALCOHOL USE DISORDER, MILD, ABUSE: Primary | ICD-10-CM

## 2021-02-11 PROCEDURE — 90834 PSYTX W PT 45 MINUTES: CPT | Mod: GT | Performed by: SOCIAL WORKER

## 2021-02-11 NOTE — PROGRESS NOTES
Progress Note    Patient Name: Nick Montiel  Date: 2/11/2021         Service Type: Individual      Session Start Time: 11:00  Session End Time: 11:52     Session Length: 52    Session #: 27    Attendees: Client attended alone    Service Modality:  Video Visit:    Telemedicine Visit: The patient's condition can be safely assessed and treated via synchronous audio and visual telemedicine encounter.      Reason for Telemedicine Visit: Services only offered telehealth    Originating Site (Patient Location): Patient's home    Distant Site (Provider Location): Provider Remote Setting    Consent:  The patient/guardian has verbally consented to: the potential risks and benefits of telemedicine (video visit) versus in person care; bill my insurance or make self-payment for services provided; and responsibility for payment of non-covered services.     Patient would like the video invitation sent by: Send to e-mail at: [email or web address]     Mode of Communication:  Video Conference via [employer]    As the provider I attest to compliance with applicable laws and regulations related to telemedicine.     Treatment Plan Last Reviewed: 1/6/2021    DATA  Interactive Complexity: No  Crisis: No       Progress Since Last Session (Related to Symptoms / Goals / Homework):   Symptoms: Improving patient reports drinking less during week and working on ways to limit use.    Homework: Achieved / completed to satisfaction      Episode of Care Goals: Satisfactory progress - ACTION (Actively working towards change); Intervened by reinforcing change plan / affirming steps taken     Current / Ongoing Stressors and Concerns:   Patient reports being stressed about his health and that he has frozen shoulder in both shoulders.  Patient reports that stressors have led to more unhappiness in his relationship with .  Patient reports feeling irritable and easily snapping when with his   and that this behavior contributes to relationship problems.  Patient also notes concerns that he does not have friends.      Treatment Objective(s) Addressed in This Session:   Patient will 2-3 identify triggers for use     Intervention:   Solution Focused: Patient identified that when he gets off of work that he notices that he thinks about grabbing drinks as a way to deal with stress.  Patient discussed that at times he is able to disregard this thought.  Patient reported that in the past he drank ice tea as a way to drink something else.  He agreed that he could find a drink that could fit his needs now.  Patient was open to trying different kinds of seltzer garduno that might have flavors closer to [place].        ASSESSMENT: Current Emotional / Mental Status (status of significant symptoms):   Risk status (Self / Other harm or suicidal ideation)   Patient denies current fears or concerns for personal safety.   Patient denies current or recent suicidal ideation or behaviors.   Patient denies current or recent homicidal ideation or behaviors.   Patient denies current or recent self injurious behavior or ideation.   Patient denies other safety concerns.   Patient reports there has been no change in risk factors since their last session.     Patient reports there has been no change in protective factors since their last session.     Recommended that patient call 911 or go to the local ED should there be a change in any of these risk factors.     Appearance:   Appropriate    Eye Contact:   Good    Psychomotor Behavior: Normal    Attitude:   Cooperative  Interested   Orientation:   All   Speech    Rate / Production: Talkative    Volume:  Normal    Mood:    Normal   Affect:    Bright    Thought Content:  Clear  Rumination    Thought Form:  Coherent  Logical    Insight:    Fair      Medication Review:   No current psychiatric medications prescribed     Medication Compliance:   NA     Changes in Health  Issues:   None reported     Chemical Use Review:   Substance Use: decrease in alcohol .  Patient reports frequency of use Weekends and Wednesday.  Reports that he did drink on Monday this last week..  Stage of Change: Action  Working on harm reduction as a way to limit drinking, affirmed steps that patient is taking to reduce drinking, identifying triggers of use.        Tobacco Use: No current tobacco use.      Diagnosis:  1. Alcohol use disorder, mild, abuse        Collateral Reports Completed:   Not Applicable    PLAN: (Patient Tasks / Therapist Tasks / Other)   Keeping alcohol use to Saturday and Sunday and having 6 drinks in the house at a time.        Rossy Barnes, ANNAMARIESW                                                         ______________________________________________________________________    Treatment Plan    Patient's Name: Nick Montiel  YOB: 1970    Date: 1/6/2021    DSM5 Diagnoses: Substance-Related & Addictive Disorders Alcohol Use Disorder   305.00 (F10.10) Mild continued use  Psychosocial / Contextual Factors:  Individual Factors has had problems with alcohol use his adult lifetime, Family Factors Reports a negative relationship with mom and does not speak with dad.  His current partner is concerend about his alcohol use and his mood swings that cause anger,  and Economic Factors currently works as a  and is economically comfortable   WHODAS: TBD    Referral / Collaboration:  The following referral(s) will be initiated: Group support to limit alcohol use.[email or web address] or [facility]     Anticipated number of session or this episode of care: 10-20      MeasurableTreatment Goal(s) related to diagnosis / functional impairment(s)  Goal 1: Patient will not give up easily    I will know I've met my goal when I am finishing what I started, and completed goals.      Objective #A (Patient Action)    Patient will Identify negative self-talk and  behaviors: challenge core beliefs, myths, and actions.  Status: Completed - Date: 1/6/2021     Intervention(s)  Therapist will assign homework to engage in cognitive restructuring  provide support to critically look at how negative beliefs keep you from moving forward .    Objective #B  Patient will identify core beliefs and the times in your life that originally created beliefs.  Status: Completed - Date: 1/6/2021     Intervention(s)  Therapist will assign homework to challenge core beliefs  provide educational materials on core beliefs and lifetime patterns  teach how to challenge core beliefs.    Objective #C  Patient will will be working towards his goals .  Status: Continued - Date(s): 1/6/2021    Intervention(s)  Therapist will assign homework to continue to use skills from therapy  provide support to negotiate what works and what patient continues to struggle with.      Goal 2: Patient will not be so negative     I will know I've met my goal when My  would tell me I am nicer, I would be able to define what it means to be a more positive person.      Objective #A (Patient Action)    Status: Completed - Date: 1/6/2021     Patient will identify feelings and emotions that occur prior to more negative behaviors.    Intervention(s)  Therapist will assign homework to track patient thoughts and feelings  provide educational materials on cognitive distortions  teach cognitive restructuring.    Objective #B  Patient will identify atleast 3 other responses that I can have other than negative.    Status: Continued - Date(s): 1/6/2021    Intervention(s)  Therapist will assign homework to practice other types of responses  provide educational materials on effective ways to communicate with others  role-play more effective ways to communicate with others.    Objective #C  Patient will learn and demonstrate 3 assertiveness skill(s).  Status: Continued - Date(s): 1/6/2021    Intervention(s)  Therapist will assign  homework to use assertive communication  role-play assertiveness skills.      Goal 3: Patient will find a hobby or interest    I will know I've met my goal when I go back to school and feel like I have things that are meaningful.      Objective #A (Patient Action)    Status: Completed - Date: 10/14/2020     Patient will Identify what he would like to do and steps that would get him there.    Intervention(s)  Therapist will assign homework to decide on steps to get towards goals  teach the client how to complete a 4-part pros and cons. to help with decision making.    Objective #B  Patient will identify barriers that keep him from moving forward with his goals.    Status: Continued - Date(s): 1/6/2021     Intervention(s)  Therapist will assign homework to note barriers   teach problem-sovling model.    Objective #C  Patient will engage in problem solving to help him move forward with his interst.  Status: Continued - Date(s): 1/6/2021     Intervention(s)  Therapist will assign homework to carry out possible solutions  provide support to gauge how problem-solving is going and other ways to look at issues.      Patient has reviewed and agreed to the above plan.      Rossy Barnes, [facility]  January 6, 2021

## 2021-02-16 ENCOUNTER — THERAPY VISIT (OUTPATIENT)
Dept: PHYSICAL THERAPY | Facility: CLINIC | Age: 51
End: 2021-02-16
Payer: COMMERCIAL

## 2021-02-16 DIAGNOSIS — M54.12 CERVICAL RADICULOPATHY: ICD-10-CM

## 2021-02-16 PROCEDURE — 97110 THERAPEUTIC EXERCISES: CPT | Mod: GP | Performed by: PHYSICAL THERAPIST

## 2021-02-16 PROCEDURE — 97140 MANUAL THERAPY 1/> REGIONS: CPT | Mod: GP | Performed by: PHYSICAL THERAPIST

## 2021-02-18 ENCOUNTER — VIRTUAL VISIT (OUTPATIENT)
Dept: PSYCHOLOGY | Facility: CLINIC | Age: 51
End: 2021-02-18
Payer: COMMERCIAL

## 2021-02-18 DIAGNOSIS — F10.10 ALCOHOL USE DISORDER, MILD, ABUSE: Primary | ICD-10-CM

## 2021-02-18 PROCEDURE — 90834 PSYTX W PT 45 MINUTES: CPT | Mod: GT | Performed by: SOCIAL WORKER

## 2021-02-18 NOTE — PROGRESS NOTES
Progress Note    Patient Name: Nick Montiel  Date: 2/18/2021         Service Type: Individual      Session Start Time: 11:00  Session End Time: 11:52     Session Length: 52    Session #: 28    Attendees: Client attended alone    Service Modality:  Video Visit:    Telemedicine Visit: The patient's condition can be safely assessed and treated via synchronous audio and visual telemedicine encounter.      Reason for Telemedicine Visit: Services only offered telehealth    Originating Site (Patient Location): Patient's home    Distant Site (Provider Location): Provider Remote Setting    Consent:  The patient/guardian has verbally consented to: the potential risks and benefits of telemedicine (video visit) versus in person care; bill my insurance or make self-payment for services provided; and responsibility for payment of non-covered services.     Patient would like the video invitation sent by: Send to e-mail at: [email or web address]     Mode of Communication:  Video Conference via [employer]well    As the provider I attest to compliance with applicable laws and regulations related to telemedicine.     Treatment Plan Last Reviewed: 1/6/2021    DATA  Interactive Complexity: No  Crisis: No       Progress Since Last Session (Related to Symptoms / Goals / Homework):   Symptoms: Improving Patient has been able to keep drinking to them times that he set for himself.    Homework: Achieved / completed to satisfaction      Episode of Care Goals: Satisfactory progress - ACTION (Actively working towards change); Intervened by reinforcing change plan / affirming steps taken     Current / Ongoing Stressors and Concerns:   Patient reports being stressed about his health and that he has frozen shoulder in both shoulders.  Patient reports that stressors have led to more unhappiness in his relationship with .  Patient reports feeling irritable and easily snapping when with his   and that this behavior contributes to relationship problems.  Patient also notes concerns that he does not have friends.      Treatment Objective(s) Addressed in This Session:   learn & utilize at least 1 assertive communication skills weekly     Intervention:   Solution Focused: Explored with patient how drinking is going and he noted that he feels he is doing well in this area.  He reports that he continues to be interested in finding groups and had begun looking into the web sites that were discussed last week.  Patient stated that he would become concerned with his drinking if he began to drink more on weekdays or if he learned that drinking was having a negative impact on his health.     Patient stated that he would like to discuss a concern that his  raised that patient can be dismissive at times.  Discussed a recent fight that he and patient had about the placement of furniture. Patient was encouraged to reflect on the negative thinking style of mind reading and how that might be causing him to think he knows what his  wants versus asking his  what he wants.         ASSESSMENT: Current Emotional / Mental Status (status of significant symptoms):   Risk status (Self / Other harm or suicidal ideation)   Patient denies current fears or concerns for personal safety.   Patient denies current or recent suicidal ideation or behaviors.   Patient denies current or recent homicidal ideation or behaviors.   Patient denies current or recent self injurious behavior or ideation.   Patient denies other safety concerns.   Patient reports there has been no change in risk factors since their last session.     Patient reports there has been no change in protective factors since their last session.     Recommended that patient call 911 or go to the local ED should there be a change in any of these risk factors.     Appearance:   Appropriate    Eye Contact:   Good    Psychomotor Behavior: Normal     Attitude:   Cooperative  Interested   Orientation:   All   Speech    Rate / Production: Talkative    Volume:  Normal    Mood:    Normal   Affect:    Bright    Thought Content:  Clear  Rumination    Thought Form:  Coherent  Logical    Insight:    Fair      Medication Review:   No current psychiatric medications prescribed     Medication Compliance:   NA     Changes in Health Issues:   None reported     Chemical Use Review:   Substance Use: decrease in alcohol .  Patient reports frequency of use Weekends and Wednesday. .  Stage of Change: Action  Reviewed information and resources for treatment and ongoing sobriety        Tobacco Use: No current tobacco use.      Diagnosis:  1. Alcohol use disorder, mild, abuse        Collateral Reports Completed:   Not Applicable    PLAN: (Patient Tasks / Therapist Tasks / Other)  Research groups  Talk to  about why he wants the cabinet in the living room        LETICIA Naqvi                                                         ______________________________________________________________________    Treatment Plan    Patient's Name: Nick Montiel  YOB: 1970    Date: 1/6/2021    DSM5 Diagnoses: Substance-Related & Addictive Disorders Alcohol Use Disorder   305.00 (F10.10) Mild continued use  Psychosocial / Contextual Factors:  Individual Factors has had problems with alcohol use his adult lifetime, Family Factors Reports a negative relationship with mom and does not speak with dad.  His current partner is concerend about his alcohol use and his mood swings that cause anger,  and Economic Factors currently works as a  and is economically comfortable   WHODAS: TBD    Referral / Collaboration:  The following referral(s) will be initiated: Group support to limit alcohol use.[email or web address] or contact Daphne Chen     Anticipated number of session or this episode of care: 10-20      MeasurableTreatment Goal(s) related  to diagnosis / functional impairment(s)  Goal 1: Patient will not give up easily    I will know I've met my goal when I am finishing what I started, and completed goals.      Objective #A (Patient Action)    Patient will Identify negative self-talk and behaviors: challenge core beliefs, myths, and actions.  Status: Completed - Date: 1/6/2021     Intervention(s)  Therapist will assign homework to engage in cognitive restructuring  provide support to critically look at how negative beliefs keep you from moving forward .    Objective #B  Patient will identify core beliefs and the times in your life that originally created beliefs.  Status: Completed - Date: 1/6/2021     Intervention(s)  Therapist will assign homework to challenge core beliefs  provide educational materials on core beliefs and lifetime patterns  teach how to challenge core beliefs.    Objective #C  Patient will will be working towards his goals .  Status: Continued - Date(s): 1/6/2021    Intervention(s)  Therapist will assign homework to continue to use skills from therapy  provide support to negotiate what works and what patient continues to struggle with.      Goal 2: Patient will not be so negative     I will know I've met my goal when My  would tell me I am nicer, I would be able to define what it means to be a more positive person.      Objective #A (Patient Action)    Status: Completed - Date: 1/6/2021     Patient will identify feelings and emotions that occur prior to more negative behaviors.    Intervention(s)  Therapist will assign homework to track patient thoughts and feelings  provide educational materials on cognitive distortions  teach cognitive restructuring.    Objective #B  Patient will identify atleast 3 other responses that I can have other than negative.    Status: Continued - Date(s): 1/6/2021    Intervention(s)  Therapist will assign homework to practice other types of responses  provide educational materials on effective  ways to communicate with others  role-play more effective ways to communicate with others.    Objective #C  Patient will learn and demonstrate 3 assertiveness skill(s).  Status: Continued - Date(s): 1/6/2021    Intervention(s)  Therapist will assign homework to use assertive communication  role-play assertiveness skills.      Goal 3: Patient will find a hobby or interest    I will know I've met my goal when I go back to school and feel like I have things that are meaningful.      Objective #A (Patient Action)    Status: Completed - Date: 10/14/2020     Patient will Identify what he would like to do and steps that would get him there.    Intervention(s)  Therapist will assign homework to decide on steps to get towards goals  teach the client how to complete a 4-part pros and cons. to help with decision making.    Objective #B  Patient will identify barriers that keep him from moving forward with his goals.    Status: Continued - Date(s): 1/6/2021     Intervention(s)  Therapist will assign homework to note barriers   teach problem-sovling model.    Objective #C  Patient will engage in problem solving to help him move forward with his interst.  Status: Continued - Date(s): 1/6/2021     Intervention(s)  Therapist will assign homework to carry out possible solutions  provide support to gauge how problem-solving is going and other ways to look at issues.      Patient has reviewed and agreed to the above plan.      Rossy Barnes, [facility]  January 6, 2021

## 2021-02-23 ENCOUNTER — THERAPY VISIT (OUTPATIENT)
Dept: PHYSICAL THERAPY | Facility: CLINIC | Age: 51
End: 2021-02-23
Payer: COMMERCIAL

## 2021-02-23 DIAGNOSIS — M54.12 CERVICAL RADICULOPATHY: ICD-10-CM

## 2021-02-23 PROCEDURE — 97110 THERAPEUTIC EXERCISES: CPT | Mod: GP | Performed by: PHYSICAL THERAPIST

## 2021-02-23 PROCEDURE — 97140 MANUAL THERAPY 1/> REGIONS: CPT | Mod: GP | Performed by: PHYSICAL THERAPIST

## 2021-02-25 ENCOUNTER — VIRTUAL VISIT (OUTPATIENT)
Dept: PSYCHOLOGY | Facility: CLINIC | Age: 51
End: 2021-02-25
Payer: COMMERCIAL

## 2021-02-25 DIAGNOSIS — F10.10 ALCOHOL USE DISORDER, MILD, ABUSE: Primary | ICD-10-CM

## 2021-02-25 PROCEDURE — 90834 PSYTX W PT 45 MINUTES: CPT | Mod: GT | Performed by: SOCIAL WORKER

## 2021-02-25 NOTE — PROGRESS NOTES
Progress Note    Patient Name: Nick Montiel  Date: 2/25/2021         Service Type: Individual      Session Start Time: 11:00  Session End Time: 11:52     Session Length: 52    Session #: 29    Attendees: Client attended alone    Service Modality:  Video Visit:    Telemedicine Visit: The patient's condition can be safely assessed and treated via synchronous audio and visual telemedicine encounter.      Reason for Telemedicine Visit: Services only offered telehealth    Originating Site (Patient Location): Patient's home    Distant Site (Provider Location): Provider Remote Setting    Consent:  The patient/guardian has verbally consented to: the potential risks and benefits of telemedicine (video visit) versus in person care; bill my insurance or make self-payment for services provided; and responsibility for payment of non-covered services.     Patient would like the video invitation sent by: Send to e-mail at: [email or web address]     Mode of Communication:  Video Conference via [employer]    As the provider I attest to compliance with applicable laws and regulations related to telemedicine.     Treatment Plan Last Reviewed: 1/6/2021    DATA  Interactive Complexity: No  Crisis: No       Progress Since Last Session (Related to Symptoms / Goals / Homework):   Symptoms: No change Patient reports no changes    Homework: Did not complete      Episode of Care Goals: Satisfactory progress - PREPARATION (Decided to change - considering how); Intervened by negotiating a change plan and determining options / strategies for behavior change, identifying triggers, exploring social supports, and working towards setting a date to begin behavior change     Current / Ongoing Stressors and Concerns:   Patient reports being stressed about his health and that he has frozen shoulder in both shoulders.  Patient reports that stressors have led to more unhappiness in his relationship with  .  Patient reports feeling irritable and easily snapping when with his  and that this behavior contributes to relationship problems.  Patient also notes concerns that he does not have friends.      Treatment Objective(s) Addressed in This Session:   Identify negative self-talk and behaviors: challenge core beliefs, myths, and actions     Intervention:   ACT: Discussed the idea that we get to make towards moves in any moment or away moves.  Discussed how getting caught in distorted thinking creates away moves and moves us further from what matters.  Patient was provided information that when he engage with our values that we are able to make towards moves and when we have acceptance with our emotions.  Discussed with patient his values and how that supports him in having the conversation with his  about the cabinet.  Patient reported feeling good to look at values as he commonly does not see himself as a good person.        ASSESSMENT: Current Emotional / Mental Status (status of significant symptoms):   Risk status (Self / Other harm or suicidal ideation)   Patient denies current fears or concerns for personal safety.   Patient denies current or recent suicidal ideation or behaviors.   Patient denies current or recent homicidal ideation or behaviors.   Patient denies current or recent self injurious behavior or ideation.   Patient denies other safety concerns.   Patient reports there has been no change in risk factors since their last session.     Patient reports there has been no change in protective factors since their last session.     Recommended that patient call 911 or go to the local ED should there be a change in any of these risk factors.     Appearance:   Appropriate    Eye Contact:   Good    Psychomotor Behavior: Normal    Attitude:   Cooperative  Interested   Orientation:   All   Speech    Rate / Production: Talkative    Volume:  Normal    Mood:    Normal   Affect:    Appropriate     Thought Content:  Clear  Rumination    Thought Form:  Coherent  Logical    Insight:    Fair      Medication Review:   No current psychiatric medications prescribed     Medication Compliance:   NA     Changes in Health Issues:   None reported     Chemical Use Review:   Substance Use: Chemical use reviewed, no active concerns identified      Tobacco Use: No current tobacco use.      Diagnosis:  1. Alcohol use disorder, mild, abuse        Collateral Reports Completed:   Not Applicable    PLAN: (Patient Tasks / Therapist Tasks / Other)  Research groups - patient set reminder to help him remember  Talk to  about why he wants the cabinet in the living room, will use his value that he has in this relationship        Rossy Barnes, LICSW                                                         ______________________________________________________________________    Treatment Plan    Patient's Name: Nick Montiel  YOB: 1970    Date: 1/6/2021    DSM5 Diagnoses: Substance-Related & Addictive Disorders Alcohol Use Disorder   305.00 (F10.10) Mild continued use  Psychosocial / Contextual Factors:  Individual Factors has had problems with alcohol use his adult lifetime, Family Factors Reports a negative relationship with mom and does not speak with dad.  His current partner is concerend about his alcohol use and his mood swings that cause anger,  and Economic Factors currently works as a  and is economically comfortable   WHODAS: TBD    Referral / Collaboration:  The following referral(s) will be initiated: Group support to limit [email or web address] or contact Daphne Chen     Anticipated number of session or this episode of care: 10-20      MeasurableTreatment Goal(s) related to diagnosis / functional impairment(s)  Goal 1: Patient will not give up easily    I will know I've met my goal when I am finishing what I started, and completed goals.      Objective #A  (Patient Action)    Patient will Identify negative self-talk and behaviors: challenge core beliefs, myths, and actions.  Status: Completed - Date: 1/6/2021     Intervention(s)  Therapist will assign homework to engage in cognitive restructuring  provide support to critically look at how negative beliefs keep you from moving forward .    Objective #B  Patient will identify core beliefs and the times in your life that originally created beliefs.  Status: Completed - Date: 1/6/2021     Intervention(s)  Therapist will assign homework to challenge core beliefs  provide educational materials on core beliefs and lifetime patterns  teach how to challenge core beliefs.    Objective #C  Patient will will be working towards his goals .  Status: Continued - Date(s): 1/6/2021    Intervention(s)  Therapist will assign homework to continue to use skills from therapy  provide support to negotiate what works and what patient continues to struggle with.      Goal 2: Patient will not be so negative     I will know I've met my goal when My  would tell me I am nicer, I would be able to define what it means to be a more positive person.      Objective #A (Patient Action)    Status: Completed - Date: 1/6/2021     Patient will identify feelings and emotions that occur prior to more negative behaviors.    Intervention(s)  Therapist will assign homework to track patient thoughts and feelings  provide educational materials on cognitive distortions  teach cognitive restructuring.    Objective #B  Patient will identify atleast 3 other responses that I can have other than negative.    Status: Continued - Date(s): 1/6/2021    Intervention(s)  Therapist will assign homework to practice other types of responses  provide educational materials on effective ways to communicate with others  role-play more effective ways to communicate with others.    Objective #C  Patient will learn and demonstrate 3 assertiveness skill(s).  Status: Continued -  Date(s): 1/6/2021    Intervention(s)  Therapist will assign homework to use assertive communication  role-play assertiveness skills.      Goal 3: Patient will find a hobby or interest    I will know I've met my goal when I go back to school and feel like I have things that are meaningful.      Objective #A (Patient Action)    Status: Completed - Date: 10/14/2020     Patient will Identify what he would like to do and steps that would get him there.    Intervention(s)  Therapist will assign homework to decide on steps to get towards goals  teach the client how to complete a 4-part pros and cons. to help with decision making.    Objective #B  Patient will identify barriers that keep him from moving forward with his goals.    Status: Continued - Date(s): 1/6/2021     Intervention(s)  Therapist will assign homework to note barriers   teach problem-sovling model.    Objective #C  Patient will engage in problem solving to help him move forward with his interst.  Status: Continued - Date(s): 1/6/2021     Intervention(s)  Therapist will assign homework to carry out possible solutions  provide support to gauge how problem-solving is going and other ways to look at issues.      Patient has reviewed and agreed to the above plan.      Rossy Barnes, [facility]  January 6, 2021

## 2021-03-02 ENCOUNTER — THERAPY VISIT (OUTPATIENT)
Dept: PHYSICAL THERAPY | Facility: CLINIC | Age: 51
End: 2021-03-02
Payer: COMMERCIAL

## 2021-03-02 DIAGNOSIS — M54.12 CERVICAL RADICULOPATHY: ICD-10-CM

## 2021-03-02 PROCEDURE — 97110 THERAPEUTIC EXERCISES: CPT | Mod: GP | Performed by: PHYSICAL THERAPIST

## 2021-03-02 PROCEDURE — 97140 MANUAL THERAPY 1/> REGIONS: CPT | Mod: GP | Performed by: PHYSICAL THERAPIST

## 2021-03-04 NOTE — PROGRESS NOTES
Hand Therapy Discharge Note  Reporting Period:  1/25/2021 through 3/8/2021    Diagnosis: Left Hand Numbness/Tingling  DOI: MD order 1/11/2021  Post:  2 years ago    Precautions: B Frozen Shoulder    Initial History:  Nick Montiel is a 50 year old male.    Patient reports symptoms of the left hand which occurred due to unsure, patient endorses onset of numbness/tingling over 2 years ago without known aggravating event. Since onset symptoms are recently improving.  General health as reported by patient is good.  Pertinent medical history includes:Asthma  Medical allergies:Penicillin.  Surgical history: orthopedic: R Elbow/distal humerus.  Medication history: None.    Current occupation is Realtor  Job Tasks: Computer Work, Driving, Prolonged Sitting    Occupational Profile Information:  Right hand dominant  Prior functional level:  no limitations  Patient reports symptoms of numbness and tingling   Special tests:  none, however EMG will be ordered if symptoms do not improve.    Previous treatment: none, seeing PT for bilateral shoulders and has order for cervical traction  Barriers include:none  Mobility: No difficulty  Transportation: drives  Currently working in normal job without restrictions  Leisure activities/hobbies: bowling, patient has small dog  Other: Lives with spouse    Upper Extremity Functional Index Score:  SCORE:   Column Totals: /80: 79   (A lower score indicates greater disability.)    Subjective:  Subjective changes as noted by patient: Things have been going well overall. I had some tingling the other day which was random. I had some pain here (volar wrist) and it went away right after I did the exercises.   Functional changes noted by patient: Improvement in Sleep Patterns  Response to previous treatment: good  Patient has noted adverse reaction to: None    Objective:      Numbness and Tingling (Scale 0-10):   1/25/21 2/8/21 3/8/2021   At Best 0 0 0   At Worst 3 0 0-3     Pain  Description:  Date 1/25/21 3/8/2021   Location Left hand (IF-SF), volar wrist and ulnar forearm Left hand (IF-SF), volar wrist and ulnar forearm   Pain Quality Numbness and tingling, heavy feeling in arm at times Numbness and tingling, heavy, dull discomfort   Frequency intermittent intermittent   Pain is worst Daytime, morning daytime   Exacerbated by Unsure, patient reports sleeping position is aggravating but daytime tasks are unpredictable Sleeping, wrist positiomn   Relieved by Position change, unsure Splint, exercises   Progression Recently improving resolving     ROM  Pain Report: - none  + mild    ++ moderate    +++ severe   Elbow 1/25/21 1/25/21   AROM (PROM) Right* Left   Extension 0 0   Flexion 115 140   Supination 75 80   Pronation 70 75   * History of distal humerus fracture that limits elbow flexion    ROM  Pain Report: - none  + mild    ++ moderate    +++ severe   Wrist 1/25/21 1/25/21   AROM (PROM) Right Left   Extension 71 71   Flexion 56 56   RD 20 20   UD 25 25     Strength   (Measured in pounds)  Pain Report: - none  + mild    ++ moderate    +++ severe    1/25/21 1/25/21 3/8/2021   Trials Right Left Left   1  2  3 58 50 53   Average 58 50 53     Lat Pinch 1/25/21 1/25/21 3/8/2021   Trials Right Left Left   1  2  3 18 13 14   Average 18 13 14     3 Pt Pinch 1/25/21 1/25/21 3/8/2021   Trials Right Left Left   1  2  3 12 11 12   Average 12 11 12     Edema:  None    Scar/Wound:  N/A    Sensation:   1/25/21 - Patient endorses numbness/tingling in IF through SF and through palm of left hand   2/8/21 - Resolving symptoms since splint use and exercises  3/8/21 - Patient reports one incidence of numbness/tingling during the day recently that resolved with exercises    Special Tests:   Date 1/25/21 3/8/21   Side Left Left   Tinels CT + +   Tinels PIN - NT   Tinels DSRN - NT   Tinels cubital tunnel + -   Tinels [place] - -   Phalens + 60 sec fingertips -   Elbow Flexion - NT     Cervical  Screen  Pain Report:  - none    + mild    ++ moderate    +++ severe      Active with Gentle Overpressure 1/25/2021   Flexion -   Extension -   Lateral Flexion Right -   Lateral Flexion Left -   Rotation Right -   Rotation Left -   Compression NT   Traction NT     Assessment:  Response to therapy has been improvement to:  Strength:   and pinch  Paresthesias:  Median nerve - less intense numbness and tingling, smaller area of involvement and less burning  Overall Assessment:  Patient is progressing well and is ready to discharge to home exercise program.  STG/LTG:  See goal sheet for details and updates of remaining functional limitations.     Plan:  Discharge to home exercise program    Home Program:  Carpal tunnel syndrome precautions  OTC Wrist Cock up for night and day per symptoms  Tendon gliding fist series  Distal MN glides  Median/Ulnar nerve mobility   FA flexor stretch    Please see daily flow sheet for treatment and 1:1 time provided today.

## 2021-03-08 ENCOUNTER — THERAPY VISIT (OUTPATIENT)
Dept: OCCUPATIONAL THERAPY | Facility: CLINIC | Age: 51
End: 2021-03-08
Payer: COMMERCIAL

## 2021-03-08 DIAGNOSIS — R20.0 NUMBNESS AND TINGLING IN LEFT HAND: ICD-10-CM

## 2021-03-08 DIAGNOSIS — R20.2 NUMBNESS AND TINGLING IN LEFT HAND: ICD-10-CM

## 2021-03-08 PROCEDURE — 97110 THERAPEUTIC EXERCISES: CPT | Mod: GO | Performed by: OCCUPATIONAL THERAPIST

## 2021-03-08 PROCEDURE — 97112 NEUROMUSCULAR REEDUCATION: CPT | Mod: GO | Performed by: OCCUPATIONAL THERAPIST

## 2021-03-10 ENCOUNTER — VIRTUAL VISIT (OUTPATIENT)
Dept: PSYCHOLOGY | Facility: CLINIC | Age: 51
End: 2021-03-10
Payer: COMMERCIAL

## 2021-03-10 DIAGNOSIS — F10.10 ALCOHOL USE DISORDER, MILD, ABUSE: Primary | ICD-10-CM

## 2021-03-10 PROCEDURE — 90834 PSYTX W PT 45 MINUTES: CPT | Mod: GT | Performed by: SOCIAL WORKER

## 2021-03-10 NOTE — PROGRESS NOTES
Progress Note    Patient Name: Nick Montiel  Date: 3/10/2021         Service Type: Individual      Session Start Time: 16:30  Session End Time: 17:22     Session Length: 52    Session #: 30    Attendees: Client attended alone    Service Modality:  Video Visit:    Telemedicine Visit: The patient's condition can be safely assessed and treated via synchronous audio and visual telemedicine encounter.      Reason for Telemedicine Visit: Services only offered telehealth    Originating Site (Patient Location): Patient's home    Distant Site (Provider Location): Provider Remote Setting    Consent:  The patient/guardian has verbally consented to: the potential risks and benefits of telemedicine (video visit) versus in person care; bill my insurance or make self-payment for services provided; and responsibility for payment of non-covered services.     Patient would like the video invitation sent by: Send to e-mail at: [email or web address]}     Mode of Communication:  Video Conference via [employer]    As the provider I attest to compliance with applicable laws and regulations related to telemedicine.     Treatment Plan Last Reviewed: 1/6/2021    DATA  Interactive Complexity: No  Crisis: No       Progress Since Last Session (Related to Symptoms / Goals / Homework):   Symptoms: No change Patient states that he feels like things have continued the same, even thought he notes his  feels there has been changes    Homework: need to review      Episode of Care Goals: Satisfactory progress - PREPARATION (Decided to change - considering how); Intervened by negotiating a change plan and determining options / strategies for behavior change, identifying triggers, exploring social supports, and working towards setting a date to begin behavior change     Current / Ongoing Stressors and Concerns:   Patient reports being stressed about his health and that he has frozen shoulder in  both shoulders.  Patient reports that stressors have led to more unhappiness in his relationship with .  Patient reports feeling irritable and easily snapping when with his  and that this behavior contributes to relationship problems.  Patient also notes concerns that he does not have friends.      Treatment Objective(s) Addressed in This Session:   Discuss motivation ambivalence about making a behavior change     Intervention:   Motivational Interviewing  Target Behavior: Trying out new skills towards behavior change    Stage of Change: CONTEMPLATION (Considering change and yet undecided)    MI Intervention: Co-Developed Goal: to be a nicer person, Expressed Empathy/Understanding, Supported Autonomy, Collaboration, Evocation, Permission to raise concern or advise, Open-ended questions, Reflections: simple and complex, Change talk (evoked) and Reframe     Change Talk Expressed by the Patient: Desire to change Ability to change Reasons to change Activation    Provider Response to Change Talk: E - Evoked more info from patient about behavior change, A - Affirmed patient's thoughts, decisions, or attempts at behavior change, R - Reflected patient's change talk and S - Summarized patient's change talk statements         ASSESSMENT: Current Emotional / Mental Status (status of significant symptoms):   Risk status (Self / Other harm or suicidal ideation)   Patient denies current fears or concerns for personal safety.   Patient denies current or recent suicidal ideation or behaviors.   Patient denies current or recent homicidal ideation or behaviors.   Patient denies current or recent self injurious behavior or ideation.   Patient denies other safety concerns.   Patient reports there has been no change in risk factors since their last session.     Patient reports there has been no change in protective factors since their last session.     Recommended that patient call 911 or go to the local ED should there be  a change in any of these risk factors.     Appearance:   Appropriate    Eye Contact:   Good    Psychomotor Behavior: Normal    Attitude:   Cooperative  Interested   Orientation:   All   Speech    Rate / Production: Talkative    Volume:  Normal    Mood:    Normal   Affect:    Appropriate    Thought Content:  Clear  Rumination    Thought Form:  Coherent  Logical    Insight:    Fair      Medication Review:   No current psychiatric medications prescribed     Medication Compliance:   NA     Changes in Health Issues:   None reported     Chemical Use Review:   Substance Use: Chemical use reviewed, no active concerns identified      Tobacco Use: No current tobacco use.      Diagnosis:  1. Alcohol use disorder, mild, abuse        Collateral Reports Completed:   Not Applicable    PLAN: (Patient Tasks / Therapist Tasks / Other)  Patient will try using a grounding exercise daily  Need to review:  Research groups - patient set reminder to help him remember  Talk to  about why he wants the cabinet in the living room, will use his value that he has in this relationship        Rossy Barnes, ANNAMARIESW                                                         ______________________________________________________________________    Treatment Plan    Patient's Name: Nick Montiel  YOB: 1970    Date: 1/6/2021    DSM5 Diagnoses: Substance-Related & Addictive Disorders Alcohol Use Disorder   305.00 (F10.10) Mild continued use  Psychosocial / Contextual Factors:  Individual Factors has had problems with alcohol use his adult lifetime, Family Factors Reports a negative relationship with mom and does not speak with dad.  His current partner is concerend about his alcohol use and his mood swings that cause anger,  and Economic Factors currently works as a  and is economically comfortable   WHODAS: TBD    Referral / Collaboration:  The following referral(s) will be initiated: Group support to  limit alcohol use."[email or web address] or contact [facility]     Anticipated number of session or this episode of care: 10-20      MeasurableTreatment Goal(s) related to diagnosis / functional impairment(s)  Goal 1: Patient will not give up easily    I will know I've met my goal when I am finishing what I started, and completed goals.      Objective #A (Patient Action)    Patient will Identify negative self-talk and behaviors: challenge core beliefs, myths, and actions.  Status: Completed - Date: 1/6/2021     Intervention(s)  Therapist will assign homework to engage in cognitive restructuring  provide support to critically look at how negative beliefs keep you from moving forward .    Objective #B  Patient will identify core beliefs and the times in your life that originally created beliefs.  Status: Completed - Date: 1/6/2021     Intervention(s)  Therapist will assign homework to challenge core beliefs  provide educational materials on core beliefs and lifetime patterns  teach how to challenge core beliefs.    Objective #C  Patient will will be working towards his goals .  Status: Continued - Date(s): 1/6/2021    Intervention(s)  Therapist will assign homework to continue to use skills from therapy  provide support to negotiate what works and what patient continues to struggle with.      Goal 2: Patient will not be so negative     I will know I've met my goal when My  would tell me I am nicer, I would be able to define what it means to be a more positive person.      Objective #A (Patient Action)    Status: Completed - Date: 1/6/2021     Patient will identify feelings and emotions that occur prior to more negative behaviors.    Intervention(s)  Therapist will assign homework to track patient thoughts and feelings  provide educational materials on cognitive distortions  teach cognitive restructuring.    Objective #B  Patient will identify atleast 3 other responses that I can have other than  negative.    Status: Continued - Date(s): 1/6/2021    Intervention(s)  Therapist will assign homework to practice other types of responses  provide educational materials on effective ways to communicate with others  role-play more effective ways to communicate with others.    Objective #C  Patient will learn and demonstrate 3 assertiveness skill(s).  Status: Continued - Date(s): 1/6/2021    Intervention(s)  Therapist will assign homework to use assertive communication  role-play assertiveness skills.      Goal 3: Patient will find a hobby or interest    I will know I've met my goal when I go back to school and feel like I have things that are meaningful.      Objective #A (Patient Action)    Status: Completed - Date: 10/14/2020     Patient will Identify what he would like to do and steps that would get him there.    Intervention(s)  Therapist will assign homework to decide on steps to get towards goals  teach the client how to complete a 4-part pros and cons. to help with decision making.    Objective #B  Patient will identify barriers that keep him from moving forward with his goals.    Status: Continued - Date(s): 1/6/2021     Intervention(s)  Therapist will assign homework to note barriers   teach problem-sovling model.    Objective #C  Patient will engage in problem solving to help him move forward with his interst.  Status: Continued - Date(s): 1/6/2021     Intervention(s)  Therapist will assign homework to carry out possible solutions  provide support to gauge how problem-solving is going and other ways to look at issues.      Patient has reviewed and agreed to the above plan.      Rossy Barnes, LETICIA  January 6, 2021

## 2021-03-25 ENCOUNTER — IMMUNIZATION (OUTPATIENT)
Dept: PEDIATRICS | Facility: CLINIC | Age: 51
End: 2021-03-25
Payer: COMMERCIAL

## 2021-03-25 PROCEDURE — 0001A PR COVID VAC PFIZER DIL RECON 30 MCG/0.3 ML IM: CPT

## 2021-03-25 PROCEDURE — 91300 PR COVID VAC PFIZER DIL RECON 30 MCG/0.3 ML IM: CPT

## 2021-04-15 ENCOUNTER — IMMUNIZATION (OUTPATIENT)
Dept: PEDIATRICS | Facility: CLINIC | Age: 51
End: 2021-04-15
Attending: INTERNAL MEDICINE
Payer: COMMERCIAL

## 2021-04-15 PROCEDURE — 91300 PR COVID VAC PFIZER DIL RECON 30 MCG/0.3 ML IM: CPT

## 2021-04-15 PROCEDURE — 0002A PR COVID VAC PFIZER DIL RECON 30 MCG/0.3 ML IM: CPT

## 2021-04-25 ENCOUNTER — HEALTH MAINTENANCE LETTER (OUTPATIENT)
Age: 51
End: 2021-04-25

## 2021-04-27 ENCOUNTER — VIRTUAL VISIT (OUTPATIENT)
Dept: PSYCHOLOGY | Facility: CLINIC | Age: 51
End: 2021-04-27
Payer: COMMERCIAL

## 2021-04-27 DIAGNOSIS — F43.22 ADJUSTMENT DISORDER WITH ANXIETY: Primary | ICD-10-CM

## 2021-04-27 DIAGNOSIS — F10.10 ALCOHOL USE DISORDER, MILD, ABUSE: ICD-10-CM

## 2021-04-27 PROCEDURE — 90847 FAMILY PSYTX W/PT 50 MIN: CPT | Mod: GT | Performed by: SOCIAL WORKER

## 2021-04-27 ASSESSMENT — ANXIETY QUESTIONNAIRES
1. FEELING NERVOUS, ANXIOUS, OR ON EDGE: SEVERAL DAYS
7. FEELING AFRAID AS IF SOMETHING AWFUL MIGHT HAPPEN: NOT AT ALL
GAD7 TOTAL SCORE: 6
6. BECOMING EASILY ANNOYED OR IRRITABLE: MORE THAN HALF THE DAYS
2. NOT BEING ABLE TO STOP OR CONTROL WORRYING: SEVERAL DAYS
4. TROUBLE RELAXING: SEVERAL DAYS
3. WORRYING TOO MUCH ABOUT DIFFERENT THINGS: SEVERAL DAYS
GAD7 TOTAL SCORE: 6
5. BEING SO RESTLESS THAT IT IS HARD TO SIT STILL: NOT AT ALL
7. FEELING AFRAID AS IF SOMETHING AWFUL MIGHT HAPPEN: NOT AT ALL

## 2021-04-27 ASSESSMENT — PATIENT HEALTH QUESTIONNAIRE - PHQ9
SUM OF ALL RESPONSES TO PHQ QUESTIONS 1-9: 2
10. IF YOU CHECKED OFF ANY PROBLEMS, HOW DIFFICULT HAVE THESE PROBLEMS MADE IT FOR YOU TO DO YOUR WORK, TAKE CARE OF THINGS AT HOME, OR GET ALONG WITH OTHER PEOPLE: NOT DIFFICULT AT ALL
SUM OF ALL RESPONSES TO PHQ QUESTIONS 1-9: 2

## 2021-04-27 NOTE — PROGRESS NOTES
Progress Note    Patient Name: Nick Montiel  Date: 2021         Service Type: Family with client present      Session Start Time: 10 AM  session End Time: 10:50 AM     Session Length: 50    Session #: 1    Attendees: Client and Spouse / Significant Other    Service Modality:  Video Visit:      Provider verified identity through the following two step process.  Patient provided:  Patient  and Patient address    Telemedicine Visit: The patient's condition can be safely assessed and treated via synchronous audio and visual telemedicine encounter.      Reason for Telemedicine Visit: Services only offered telehealth    Originating Site (Patient Location): Patient's home    Distant Site (Provider Location): Provider Remote Setting    Consent:  The patient/guardian has verbally consented to: the potential risks and benefits of telemedicine (video visit) versus in person care; bill my insurance or make self-payment for services provided; and responsibility for payment of non-covered services.     Patient would like the video invitation sent by:  My Chart    Mode of Communication:  Video Conference via Amwell    As the provider I attest to compliance with applicable laws and regulations related to telemedicine.     Treatment Plan Last Reviewed: In process  PHQ-9 / DAPHNE-7 : Today; reporting minimal depression/ moderate anxiety    DATA  Interactive Complexity: No  Crisis: No       Progress Since Last Session (Related to Symptoms / Goals / Homework):   Symptoms: Establishing baseline for therapy    Homework: At sessions and gave each homework see below      Episode of Care Goals: Minimal progress - PREPARATION (Decided to change - considering how); Intervened by negotiating a change plan and determining options / strategies for behavior change, identifying triggers, exploring social supports, and working towards setting a date to begin behavior change     Current  "/ Ongoing Stressors and Concerns:   Seeking joint therapy to address longstanding issues with trust and intimacy.  Each agreeing to establish individual therapy concurrent with couples work.  Client himself seeking a transfer from previous individual therapist hoping to work with someone with a more directive style.     Treatment Objective(s) Addressed in This Session:   Obtaining information related to their history of the relationship, past and current concerns.  Building rapport and beginning to explore treatment goals.       Intervention:    for 1 year together for 9 years.  Client 51 and spouse 36.  Met in [facility] at a bar.  Moved in together in January 2020.  Spouse relates that client is anxious and then tends to lash out at him and can get mean.  He feels his confidence has been eroded in the relationship and that he has trust issues.  Both agree their intimacy is lacking and they would like to rebuild trust and regain intimacy.  Client agrees he can be critical and negative and also has a history of alcohol abuse.  Client reports that he has reduced his alcohol use to 3-6 seltzer's over a weekend day.  He states his alcohol use can be daily or he can stop for a month that it is \"off-and-on\".  Understands that he will need to be open to looking at this as it impacts his intimacy in his relationship as well as his own mood and self-care.  Both deny issues of physical abuse in the relationship.  Both feel committed to working through issues both jointly and individually.        ASSESSMENT: Current Emotional / Mental Status (status of significant symptoms):   Risk status (Self / Other harm or suicidal ideation)   Patient denies current fears or concerns for personal safety.   Patient denies current or recent suicidal ideation or behaviors.   Patient denies current or recent homicidal ideation or behaviors.   Patient denies current or recent self injurious behavior or ideation.   Patient denies " other safety concerns.   Patient reports there has been no change in risk factors since their last session.     Patient reports there has been no change in protective factors since their last session.     Recommended that patient call 911 or go to the local ED should there be a change in any of these risk factors.     Appearance:   Appropriate    Eye Contact:   Good    Psychomotor Behavior: Normal    Attitude:   Cooperative  Interested Friendly Pleasant   Orientation:   All   Speech    Rate / Production: Normal/ Responsive Normal     Volume:  Normal    Mood:    Anxious  Normal   Affect:    Appropriate    Thought Content:  Clear    Thought Form:  Coherent  Goal Directed  Logical    Insight:    Good  and Intellectual Insight     Medication Review:   No current psychiatric medications prescribed     Medication Compliance:   NA     Changes in Health Issues:   None reported     Chemical Use Review:   Substance Use: Chemical use reviewed,  concerns identified and will continue to be monitored in this episode of care.     Tobacco Use: No current tobacco use.      Diagnosis:  1. Adjustment disorder with anxiety    2. Alcohol use disorder, mild, abuse        Collateral Reports Completed:   Not Applicable    PLAN: (Patient Tasks / Therapist Tasks / Other)  Complete treatment planning.  Each accepted referral to intake to establish care with individual therapist within [facility].  Each given homework assignment to reflect on what they each bring to the relationship that is destructive to their communication as well as what they each appreciating value about their partner.        Divina Tong, [facility] April 27, 2021                                                         ______________________________________________________________________    Treatment Plan    Patient's Name: Nick Montiel  YOB: 1970    Date: April 27, 2021    DSM5 Diagnoses: Adjustment Disorders  309.24  (F43.22) With anxiety  Psychosocial / Contextual Factors: Negative patterns in relationship have eroded trust and intimacy.  Client has been involved in the past with harm reduction groups and is continuing to look at his alcohol use.  WHODAS:     Referral / Collaboration:  The following referral(s) will be initiated: Each will engage in individual therapy.    Anticipated number of session or this episode of care: Evaluate every 90 days      MeasurableTreatment Goal(s) related to diagnosis / functional impairment(s)  Goal 1: Patient will work to improve trust and relationship by improving communication    I will know I've met my goal when I am managing my mood and the communication better.      Objective #A (Patient Action)    Patient will Be able to identify longstanding patterns in thinking feeling and behavior that negatively impact ability to communicate directly and respectfully.  Status: New     Intervention(s)  Therapist will Teach assertiveness skills, CBT, DBT.    Objective #B  Patient will Along with partner identify activities and behaviors that build trust and closeness.  Status: New     Intervention(s)  Therapist will Facilitate process and encourage change, provide resources and material to improve self awareness.    Objective #C  Patient will Continue to monitor in an ongoing way as use of alcohol and make a determination to assist in making change..  Status: New     Intervention(s)  Therapist will Encourage and support efforts, provide resources.          Patient has reviewed and agreed to the above plan.      Divina Tong, [facility]  April 27, 2021    Answers for HPI/ROS submitted by the patient on 4/27/2021   DAPHNE 7 TOTAL SCORE: 6  If you checked off any problems, how difficult have these problems made it for you to do your work, take care of things at home, or get along with other people?: Not difficult at all  PHQ9 TOTAL SCORE: 2

## 2021-04-28 ASSESSMENT — ANXIETY QUESTIONNAIRES: GAD7 TOTAL SCORE: 6

## 2021-04-28 ASSESSMENT — PATIENT HEALTH QUESTIONNAIRE - PHQ9: SUM OF ALL RESPONSES TO PHQ QUESTIONS 1-9: 2

## 2021-05-06 PROBLEM — M25.511 BILATERAL SHOULDER PAIN: Status: RESOLVED | Noted: 2020-09-02 | Resolved: 2021-05-06

## 2021-05-06 PROBLEM — M25.512 BILATERAL SHOULDER PAIN: Status: RESOLVED | Noted: 2020-09-02 | Resolved: 2021-05-06

## 2021-05-06 PROBLEM — M54.12 CERVICAL RADICULOPATHY: Status: RESOLVED | Noted: 2021-02-03 | Resolved: 2021-05-06

## 2021-05-06 NOTE — PROGRESS NOTES
Subjective:  HPI  Physical Exam                    Objective:  System    Physical Exam    General     ROS    Assessment/Plan:    DISCHARGE REPORT    Progress reporting period is from 2-2-21 to 3-2-21, 5 visits .       SUBJECTIVE  At last visit patient reported no symptoms except for  some intermittent minimal numbness distal lateral forearm for the last couple days, unsure why.  Also noticed increased sensitivity base of left thumb yesterday.    Pain level: 1/10(numbness left lateral lower forearm).      Initial Pain level: (0-6/10).   Changes in function:  Yes (See Goal flowsheet attached for changes in current functional level)  Adverse reaction to treatment or activity: None    OBJECTIVE  At last visit cervical AROM extension 75%, bilateral rotation 90%.    AROM left shoudler flexion 101, abduction 103, ext/IR to L1.       ASSESSMENT/PLAN  Updated problem list and treatment plan: Diagnosis 1:  Cervical pain    Pain -  self management and home program  Impaired posture - home program  STG/LTGs have been met or progress has been made towards goals:  Yes (See Goal flow sheet completed today.)  Assessment of Progress: The patient's condition is improving.  Self Management Plans:  Patient has been instructed in a home treatment program.  I have re-evaluated this patient and find that the nature, scope, duration and intensity of the therapy is appropriate for the medical condition of the patient.  Nick continues to require the following intervention to meet STG and LTG's:  PT intervention is no longer required to meet STG/LTG.    Recommendations:  Patient was to schedule further appts if was not progressing.  Patient has not for over 2 months so will discharge PT chart at this time.     Please refer to the daily flowsheet for treatment today, total treatment time and time spent performing 1:1 timed codes.

## 2021-05-18 ENCOUNTER — VIRTUAL VISIT (OUTPATIENT)
Dept: PSYCHOLOGY | Facility: CLINIC | Age: 51
End: 2021-05-18
Payer: COMMERCIAL

## 2021-05-18 DIAGNOSIS — F43.22 ADJUSTMENT DISORDER WITH ANXIETY: Primary | ICD-10-CM

## 2021-05-18 PROCEDURE — 90847 FAMILY PSYTX W/PT 50 MIN: CPT | Mod: 95 | Performed by: SOCIAL WORKER

## 2021-05-18 NOTE — PROGRESS NOTES
Progress Note    Patient Name: Nick Montiel  Date: May 18, 2021         Service Type: Family with client present      Session Start Time: 11 AM  session End Time: 11:50 AM     Session Length: 50    Session #: 2    Attendees: Client and Spouse / Significant Other    Service Modality:  Video Visit:      Provider verified identity through the following two step process.  Patient provided:  Patient is known previously to provider and Patient was verified at admission/transfer    Telemedicine Visit: The patient's condition can be safely assessed and treated via synchronous audio and visual telemedicine encounter.      Reason for Telemedicine Visit: Services only offered telehealth    Originating Site (Patient Location): Patient's home    Distant Site (Provider Location): Provider Remote Setting    Consent:  The patient/guardian has verbally consented to: the potential risks and benefits of telemedicine (video visit) versus in person care; bill my insurance or make self-payment for services provided; and responsibility for payment of non-covered services.     Patient would like the video invitation sent by:  My Chart    Mode of Communication:  Video Conference via Amwell    As the provider I attest to compliance with applicable laws and regulations related to telemedicine.     Treatment Plan Last Reviewed: In process; update plan and CGI 8/21  PHQ-9 / DAPHNE-7 : Today; reporting minimal depression/ moderate anxiety    DATA  Interactive Complexity: No  Crisis: No       Progress Since Last Session (Related to Symptoms / Goals / Homework):   Symptoms: Establishing baseline for therapy    Homework: Did not complete      Episode of Care Goals: Minimal progress - PREPARATION (Decided to change - considering how); Intervened by negotiating a change plan and determining options / strategies for behavior change, identifying triggers, exploring social supports, and working towards  setting a date to begin behavior change.  Both reported forgetting their homework assignment.  Began session with general check-in and review of complaints.  They each followed through on obtaining individual therapists through [facility] and have their first individual appointments in July.     Current / Ongoing Stressors and Concerns:   Seeking joint therapy to address longstanding issues with trust and intimacy.  Each agreeing to establish individual therapy concurrent with couples work.  Client himself seeking a transfer from previous individual therapist hoping to work with someone with a more directive style.     Treatment Objective(s) Addressed in This Session:   Obtaining information related to their history of the relationship, past and current concerns.  Building rapport and beginning to explore treatment goals.  Y: Expresses concern that spouse is controlling and has a temper which results in feeling shamed and shut down.  Wishes partner would drink less.  Sees that he has a pattern of over accommodation and difficulty setting limits because he does not want to hurt client's feelings.  B: Realizes he has an anger problem he would like to work more on an individual therapy having a quick temper much like most of his family of origin members.  Realizes he can be overly critical and negative of partner and needy.  He feels he is drastically reduced his drinking and having 2-5 white claws most evenings.   Intervention:   Past:  for 1 year together for 9 years.  Client 51 and spouse 36.  Met in [facility] at a bar.  Moved in together in January 2020.  Spouse relates that client is anxious and then tends to lash out at him and can get mean.  He feels his confidence has been eroded in the relationship and that he has trust issues.  Both agree their intimacy is lacking and they would like to rebuild trust and regain intimacy.  Client agrees he can be critical and negative and also has a history of  "alcohol abuse.  Client reports that he has reduced his alcohol use to 3-6 seltzer's over a weekend day.  He states his alcohol use can be daily or he can stop for a month that it is \"off-and-on\".  Understands that he will need to be open to looking at this as it impacts his intimacy in his relationship as well as his own mood and self-care.  Both deny issues of physical abuse in the relationship.  Both feel committed to working through issues both jointly and individually.  Today: Assisted recent argument that exemplified the above pattern.  Homework was given for each of them to come up with a list of boundaries that like to establish interpersonally in the relationship.      ASSESSMENT: Current Emotional / Mental Status (status of significant symptoms):   Risk status (Self / Other harm or suicidal ideation)   Patient denies current fears or concerns for personal safety.   Patient denies current or recent suicidal ideation or behaviors.   Patient denies current or recent homicidal ideation or behaviors.   Patient denies current or recent self injurious behavior or ideation.   Patient denies other safety concerns.   Patient reports there has been no change in risk factors since their last session.     Patient reports there has been no change in protective factors since their last session.     Recommended that patient call 911 or go to the local ED should there be a change in any of these risk factors.     Appearance:   Appropriate    Eye Contact:   Good    Psychomotor Behavior: Normal    Attitude:   Cooperative  Interested Friendly Pleasant   Orientation:   All   Speech    Rate / Production: Normal/ Responsive Normal     Volume:  Normal    Mood:    Anxious  Normal   Affect:    Appropriate    Thought Content:  Clear    Thought Form:  Coherent  Goal Directed  Logical    Insight:    Good  and Intellectual Insight     Medication Review:   No current psychiatric medications prescribed     Medication " Compliance:   NA     Changes in Health Issues:   None reported     Chemical Use Review:   Substance Use: Chemical use reviewed,  concerns identified and will continue to be monitored in this episode of care.     Tobacco Use: No current tobacco use.      Diagnosis:  1. Adjustment disorder with anxiety        Collateral Reports Completed:   Not Applicable    PLAN: (Patient Tasks / Therapist Tasks / Other  Each accepted referral to intake to establish care with individual therapist within [facility].  Each given homework assignment to compile a separate list of boundaries to begin developing self awareness,        Divina Tong, [facility] May 18, 2021                                                         ______________________________________________________________________    Treatment Plan    Patient's Name: Nick Montiel  YOB: 1970    Date: April 27, 2021    DSM5 Diagnoses: Adjustment Disorders  309.24 (F43.22) With anxiety  Psychosocial / Contextual Factors: Negative patterns in relationship have eroded trust and intimacy.  Client has been involved in the past with harm reduction groups and is continuing to look at his alcohol use.  WHODAS:     Referral / Collaboration:  The following referral(s) will be initiated: Each will engage in individual therapy.    Anticipated number of session or this episode of care: Evaluate every 90 days      MeasurableTreatment Goal(s) related to diagnosis / functional impairment(s)  Goal 1: Patient will work to improve trust and relationship by improving communication    I will know I've met my goal when I am managing my mood and the communication better.      Objective #A (Patient Action)    Patient will Be able to identify longstanding patterns in thinking feeling and behavior that negatively impact ability to communicate directly and respectfully.  Status: New     Intervention(s)  Therapist will Teach assertiveness skills, CBT,  DBT.    Objective #B  Patient will Along with partner identify activities and behaviors that build trust and closeness.  Status: New     Intervention(s)  Therapist will Facilitate process and encourage change, provide resources and material to improve self awareness.    Objective #C  Patient will Continue to monitor in an ongoing way as use of alcohol and make a determination to assist in making change..  Status: New     Intervention(s)  Therapist will Encourage and support efforts, provide resources.          Patient has reviewed and agreed to the above plan.      Divina Tong, [facility]  April 27, 2021    Answers for HPI/ROS submitted by the patient on 4/27/2021   DAPHNE 7 TOTAL SCORE: 6  If you checked off any problems, how difficult have these problems made it for you to do your work, take care of things at home, or get along with other people?: Not difficult at all  PHQ9 TOTAL SCORE: 2

## 2021-05-20 PROBLEM — R20.2 NUMBNESS AND TINGLING IN LEFT HAND: Status: RESOLVED | Noted: 2021-01-25 | Resolved: 2021-05-20

## 2021-05-20 PROBLEM — R20.0 NUMBNESS AND TINGLING IN LEFT HAND: Status: RESOLVED | Noted: 2021-01-25 | Resolved: 2021-05-20

## 2021-06-02 DIAGNOSIS — E78.00 HYPERCHOLESTEROLEMIA: ICD-10-CM

## 2021-06-02 RX ORDER — ROSUVASTATIN CALCIUM 5 MG/1
5 TABLET, COATED ORAL DAILY
Qty: 90 TABLET | Refills: 1 | Status: SHIPPED | OUTPATIENT
Start: 2021-06-02 | End: 2021-11-26

## 2021-06-02 NOTE — TELEPHONE ENCOUNTER
"    Last office visit 1/22/2021 Prescription approved per [facility] Refill Protocol.    Requested Prescriptions   Pending Prescriptions Disp Refills     rosuvastatin (CRESTOR) 5 MG tablet 90 tablet 1     Sig: Take 1 tablet (5 mg) by mouth daily       Statins Protocol Failed - 6/2/2021 11:58 AM        Failed - Recent (12 mo) or future (30 days) visit within the authorizing provider's specialty     Patient has had an office visit with the authorizing provider or a provider within the authorizing providers department within the previous 12 mos or has a future within next 30 days. See \"Patient Info\" tab in inbasket, or \"Choose Columns\" in Meds & Orders section of the refill encounter.              Passed - LDL on file in past 12 months     Recent Labs   Lab Test 01/22/21  1209   *             Passed - No abnormal creatine kinase in past 12 months     No lab results found.             Passed - Medication is active on med list        Passed - Patient is age 18 or older           "

## 2021-06-16 ENCOUNTER — VIRTUAL VISIT (OUTPATIENT)
Dept: PSYCHOLOGY | Facility: CLINIC | Age: 51
End: 2021-06-16
Payer: COMMERCIAL

## 2021-06-16 DIAGNOSIS — F43.22 ADJUSTMENT DISORDER WITH ANXIETY: Primary | ICD-10-CM

## 2021-06-16 PROCEDURE — 90847 FAMILY PSYTX W/PT 50 MIN: CPT | Mod: 95 | Performed by: SOCIAL WORKER

## 2021-06-17 NOTE — PROGRESS NOTES
Progress Note    Patient Name: Nick Montiel  Date: 6/16/21         Service Type: Family with client present      Session Start Time: 10 AM  session End Time: 10:50 AM     Session Length: 50    Session #: 3    Attendees: Client and Spouse / Significant Other    Service Modality:  Video Visit:      Provider verified identity through the following two step process.  Patient provided:  Patient is known previously to provider and Patient was verified at admission/transfer    Telemedicine Visit: The patient's condition can be safely assessed and treated via synchronous audio and visual telemedicine encounter.      Reason for Telemedicine Visit: Services only offered telehealth    Originating Site (Patient Location): Patient's home    Distant Site (Provider Location): Provider Remote Setting    Consent:  The patient/guardian has verbally consented to: the potential risks and benefits of telemedicine (video visit) versus in person care; bill my insurance or make self-payment for services provided; and responsibility for payment of non-covered services.     Patient would like the video invitation sent by:  My Chart    Mode of Communication:  Video Conference via [employer]well    As the provider I attest to compliance with applicable laws and regulations related to telemedicine.     Treatment Plan Last Reviewed: In process; update plan and CGI 8/21  PHQ-9 / DAPHNE-7 : Today; reporting minimal depression/ moderate anxiety    DATA  Interactive Complexity: No  Crisis: No       Progress Since Last Session (Related to Symptoms / Goals / Homework):   Symptoms: Establishing baseline for therapy    Homework: Did not complete      Episode of Care Goals: Minimal progress - PREPARATION (Decided to change - considering how); Intervened by negotiating a change plan and determining options / strategies for behavior change, identifying triggers, exploring social supports, and working towards  setting a date to begin behavior change.  Y completed hw; B did not.    Current / Ongoing Stressors and Concerns:   Seeking joint therapy to address longstanding issues with trust and intimacy.  Each agreeing to establish individual therapy concurrent with couples work.  Client himself seeking a transfer from previous individual therapist hoping to work with someone with a more directive style.     Treatment Objective(s) Addressed in This Session:   Obtaining information related to their history of the relationship, past and current concerns.  Building rapport and beginning to explore treatment goals.  Y: Expresses concern that spouse is controlling and has a temper which results in feeling shamed and shut down.  Wishes partner would drink less.  Sees that he has a pattern of over accommodation and difficulty setting limits because he does not want to hurt client's feelings.  B: Realizes he has an anger problem he would like to work more on an individual therapy having a quick temper much like most of his family of origin members.  Realizes he can be overly critical and negative of partner and needy.  He feels he is drastically reduced his drinking and having 2-5 white claws most evenings.  Today: Y set boundaries around asking B to not take out temper/irritation on him and to listen when he says No.  B also asked Y to listen when he says no. They acknowledge social isolation which can put pressure on the relationship.   Intervention:   Past:  for 1 year together for 9 years.  Client 51 and spouse 36.  Met in [facility] at a bar.  Moved in together in January 2020.  Spouse relates that client is anxious and then tends to lash out at him and can get mean.  He feels his confidence has been eroded in the relationship and that he has trust issues.  Both agree their intimacy is lacking and they would like to rebuild trust and regain intimacy.  Client agrees he can be critical and negative and also has a history  "of alcohol abuse.  Client reports that he has reduced his alcohol use to 3-6 seltzer's over a weekend day.  He states his alcohol use can be daily or he can stop for a month that it is \"off-and-on\".  Understands that he will need to be open to looking at this as it impacts his intimacy in his relationship as well as his own mood and self-care.  Both deny issues of physical abuse in the relationship.  Both feel committed to working through issues both jointly and individually.  Today: Challenged MURRAY to own his disengagement. He was able to be accountable to Y in session. Began exploring MURRAY's fam of o.  Very poor relationship with bio dad and brother and his 30 yo bipolar son. close with lucio. Mom was negative and critical. Between [age] he acted out with children and went to [employer] and was ordered to tx. Denies this behavior since. Carries shame and guilt.      ASSESSMENT: Current Emotional / Mental Status (status of significant symptoms):   Risk status (Self / Other harm or suicidal ideation)   Patient denies current fears or concerns for personal safety.   Patient denies current or recent suicidal ideation or behaviors.   Patient denies current or recent homicidal ideation or behaviors.   Patient denies current or recent self injurious behavior or ideation.   Patient denies other safety concerns.   Patient reports there has been no change in risk factors since their last session.     Patient reports there has been no change in protective factors since their last session.     Recommended that patient call 911 or go to the local ED should there be a change in any of these risk factors.     Appearance:   Appropriate    Eye Contact:   Good    Psychomotor Behavior: Normal    Attitude:   Cooperative  Interested Friendly Pleasant   Orientation:   All   Speech    Rate / Production: Normal/ Responsive Normal     Volume:  Normal    Mood:    Anxious  Sad    Affect:    Appropriate    Thought Content:  Clear    Thought " Form:  Coherent  Goal Directed  Logical    Insight:    Good  and Intellectual Insight     Medication Review:   No current psychiatric medications prescribed     Medication Compliance:   NA     Changes in Health Issues:   None reported     Chemical Use Review:   Substance Use: Chemical use reviewed,  concerns identified and will continue to be monitored in this episode of care.     Tobacco Use: No current tobacco use.      Diagnosis:  1. Adjustment disorder with anxiety        Collateral Reports Completed:   Not Applicable    PLAN: (Patient Tasks / Therapist Tasks / Other  Each accepted referral to intake to establish care with individual therapist within [facility].  Will cont with elizabeth work.  Both will work on respecting boundaries discussed today.        Divina Tong, [facility] 6/16/ 2021                                                         ______________________________________________________________________    Treatment Plan    Patient's Name: Nick Montile  YOB: 1970    Date: April 27, 2021    DSM5 Diagnoses: Adjustment Disorders  309.24 (F43.22) With anxiety  Psychosocial / Contextual Factors: Negative patterns in relationship have eroded trust and intimacy.  Client has been involved in the past with harm reduction groups and is continuing to look at his alcohol use.  WHODAS:     Referral / Collaboration:  The following referral(s) will be initiated: Each will engage in individual therapy.    Anticipated number of session or this episode of care: Evaluate every 90 days      MeasurableTreatment Goal(s) related to diagnosis / functional impairment(s)  Goal 1: Patient will work to improve trust and relationship by improving communication    I will know I've met my goal when I am managing my mood and the communication better.      Objective #A (Patient Action)    Patient will Be able to identify longstanding patterns in thinking feeling and behavior that  negatively impact ability to communicate directly and respectfully.  Status: New     Intervention(s)  Therapist will Teach assertiveness skills, CBT, DBT.    Objective #B  Patient will Along with partner identify activities and behaviors that build trust and closeness.  Status: New     Intervention(s)  Therapist will Facilitate process and encourage change, provide resources and material to improve self awareness.    Objective #C  Patient will Continue to monitor in an ongoing way as use of alcohol and make a determination to assist in making change..  Status: New     Intervention(s)  Therapist will Encourage and support efforts, provide resources.          Patient has reviewed and agreed to the above plan.      Divina Tong, [facility]  April 27, 2021    Answers for HPI/ROS submitted by the patient on 4/27/2021   DAPHNE 7 TOTAL SCORE: 6  If you checked off any problems, how difficult have these problems made it for you to do your work, take care of things at home, or get along with other people?: Not difficult at all  PHQ9 TOTAL SCORE: 2

## 2021-06-23 ENCOUNTER — VIRTUAL VISIT (OUTPATIENT)
Dept: PSYCHOLOGY | Facility: CLINIC | Age: 51
End: 2021-06-23
Payer: COMMERCIAL

## 2021-06-23 DIAGNOSIS — F43.22 ADJUSTMENT DISORDER WITH ANXIETY: Primary | ICD-10-CM

## 2021-06-23 PROCEDURE — 90847 FAMILY PSYTX W/PT 50 MIN: CPT | Mod: 95 | Performed by: SOCIAL WORKER

## 2021-06-23 NOTE — PROGRESS NOTES
Progress Note    Patient Name: Nick Montiel  Date: 6/23/21         Service Type: Family with client present      Session Start Time: 11 AM  session End Time: 11:50 AM     Session Length: 50    Session #: 4    Attendees: Client and Spouse / Significant Other    Service Modality:  Video Visit:      Provider verified identity through the following two step process.  Patient provided:  Patient is known previously to provider and Patient was verified at admission/transfer    Telemedicine Visit: The patient's condition can be safely assessed and treated via synchronous audio and visual telemedicine encounter.      Reason for Telemedicine Visit: Services only offered telehealth    Originating Site (Patient Location): Patient's home    Distant Site (Provider Location): Provider Remote Setting    Consent:  The patient/guardian has verbally consented to: the potential risks and benefits of telemedicine (video visit) versus in person care; bill my insurance or make self-payment for services provided; and responsibility for payment of non-covered services.     Patient would like the video invitation sent by:  My Chart    Mode of Communication:  Video Conference via Amwell    As the provider I attest to compliance with applicable laws and regulations related to telemedicine.     Treatment Plan Last Reviewed: In process; update plan and CGI 8/21  PHQ-9 / DAPHNE-7 : Today; reporting minimal depression/ moderate anxiety    DATA  Interactive Complexity: No  Crisis: No       Progress Since Last Session (Related to Symptoms / Goals / Homework):   Symptoms: Establishing baseline for therapy    Homework: Did not complete      Episode of Care Goals: Minimal progress - PREPARATION (Decided to change - considering how); Intervened by negotiating a change plan and determining options / strategies for behavior change, identifying triggers, exploring social supports, and working towards  "setting a date to begin behavior change.  Both well engaged today. Both report having communicated around a boundary issue with more progress than in the past. Y stated he has been feeling\" better and more calm that I have in a long time\".   Current / Ongoing Stressors and Concerns:   Seeking joint therapy to address longstanding issues with trust and intimacy.  Each agreeing to establish individual therapy concurrent with couples work.  Client himself seeking a transfer from previous individual therapist hoping to work with someone with a more directive style.     Treatment Objective(s) Addressed in This Session:   Obtaining information related to their history of the relationship, past and current concerns.  Building rapport and beginning to explore treatment goals.  Y: Expresses concern that spouse is controlling and has a temper which results in feeling shamed and shut down.  Wishes partner would drink less.  Sees that he has a pattern of over accommodation and difficulty setting limits because he does not want to hurt client's feelings.  B: Realizes he has an anger problem he would like to work more on an individual therapy having a quick temper much like most of his family of origin members.  Realizes he can be overly critical and negative of partner and needy.  He feels he is drastically reduced his drinking and having 2-5 white claws most evenings.  Ongoing: Y set boundaries around asking B to not take out temper/irritation on him and to listen when he says No.  B also asked Y to listen when he says no. They acknowledge social isolation which can put pressure on the relationship.  Today:  Continuing to explore B's family of origin experiences in relation to his gparents, mother and bio dad/bio brother.   Intervention:   Past:  for 1 year together for 9 years.  Client 51 and spouse 36.  Met in [facility] at a bar.  Moved in together in January 2020.  Spouse relates that client is anxious and then " "tends to lash out at him and can get mean.  He feels his confidence has been eroded in the relationship and that he has trust issues.  Both agree their intimacy is lacking and they would like to rebuild trust and regain intimacy.  Client agrees he can be critical and negative and also has a history of alcohol abuse.  Client reports that he has reduced his alcohol use to 3-6 seltzer's over a weekend day.  He states his alcohol use can be daily or he can stop for a month that it is \"off-and-on\".  Understands that he will need to be open to looking at this as it impacts his intimacy in his relationship as well as his own mood and self-care.  Both deny issues of physical abuse in the relationship.  Both feel committed to working through issues both jointly and individually.  Today: very connected to gparents who basically raised him while his single parent mother worked. Not close with bio dad nor older brother.Few relational memories with them. Did not go to parents for support; only gparents. Mother became more critical/negative after he got into trouble at age 14-16 and later when he got his gf pregnant at 19. She has remained this way with him. Asked him to reflect on how control and anger issues became issues for himself.      ASSESSMENT: Current Emotional / Mental Status (status of significant symptoms):   Risk status (Self / Other harm or suicidal ideation)   Patient denies current fears or concerns for personal safety.   Patient denies current or recent suicidal ideation or behaviors.   Patient denies current or recent homicidal ideation or behaviors.   Patient denies current or recent self injurious behavior or ideation.   Patient denies other safety concerns.   Patient reports there has been no change in risk factors since their last session.     Patient reports there has been no change in protective factors since their last session.     Recommended that patient call 911 or go to the local ED should there be a " change in any of these risk factors.     Appearance:   Appropriate    Eye Contact:   Good    Psychomotor Behavior: Normal    Attitude:   Cooperative  Interested Friendly Pleasant   Orientation:   All   Speech    Rate / Production: Normal/ Responsive Normal     Volume:  Normal    Mood:    Anxious  Sad    Affect:    Appropriate    Thought Content:  Clear    Thought Form:  Coherent  Goal Directed  Logical    Insight:    Good  and Intellectual Insight     Medication Review:   No current psychiatric medications prescribed     Medication Compliance:   NA     Changes in Health Issues:   None reported     Chemical Use Review:   Substance Use: Chemical use reviewed,  concerns identified and will continue to be monitored in this episode of care.     Tobacco Use: No current tobacco use.      Diagnosis:  1. Adjustment disorder with anxiety        Collateral Reports Completed:   Not Applicable    PLAN: (Patient Tasks / Therapist Tasks / Other  Each accepted referral to intake to establish care with individual therapist within [facility].  Will cont with elizabeth work.  Both will work on respecting boundaries discussed today.  B will do relfection hw      Divina Tong, LICSW 6/23/ 2021                                                         ______________________________________________________________________    Treatment Plan    Patient's Name: Nick Montiel  YOB: 1970    Date: April 27, 2021    DSM5 Diagnoses: Adjustment Disorders  309.24 (F43.22) With anxiety  Psychosocial / Contextual Factors: Negative patterns in relationship have eroded trust and intimacy.  Client has been involved in the past with harm reduction groups and is continuing to look at his alcohol use.  WHODAS:     Referral / Collaboration:  The following referral(s) will be initiated: Each will engage in individual therapy.    Anticipated number of session or this episode of care: Evaluate every 90  days      MeasurableTreatment Goal(s) related to diagnosis / functional impairment(s)  Goal 1: Patient will work to improve trust and relationship by improving communication    I will know I've met my goal when I am managing my mood and the communication better.      Objective #A (Patient Action)    Patient will Be able to identify longstanding patterns in thinking feeling and behavior that negatively impact ability to communicate directly and respectfully.  Status: New     Intervention(s)  Therapist will Teach assertiveness skills, CBT, DBT.    Objective #B  Patient will Along with partner identify activities and behaviors that build trust and closeness.  Status: New     Intervention(s)  Therapist will Facilitate process and encourage change, provide resources and material to improve self awareness.    Objective #C  Patient will Continue to monitor in an ongoing way as use of alcohol and make a determination to assist in making change..  Status: New     Intervention(s)  Therapist will Encourage and support efforts, provide resources.          Patient has reviewed and agreed to the above plan.      Divina Tong, [facility]  April 27, 2021    Answers for HPI/ROS submitted by the patient on 4/27/2021   DAPHNE 7 TOTAL SCORE: 6  If you checked off any problems, how difficult have these problems made it for you to do your work, take care of things at home, or get along with other people?: Not difficult at all  PHQ9 TOTAL SCORE: 2

## 2021-06-30 ENCOUNTER — MYC MEDICAL ADVICE (OUTPATIENT)
Dept: FAMILY MEDICINE | Facility: CLINIC | Age: 51
End: 2021-06-30

## 2021-06-30 DIAGNOSIS — J45.20 MILD INTERMITTENT ASTHMA WITHOUT COMPLICATION: ICD-10-CM

## 2021-06-30 DIAGNOSIS — J30.9 ALLERGIC RHINITIS, UNSPECIFIED SEASONALITY, UNSPECIFIED TRIGGER: ICD-10-CM

## 2021-07-01 ENCOUNTER — MYC MEDICAL ADVICE (OUTPATIENT)
Dept: FAMILY MEDICINE | Facility: CLINIC | Age: 51
End: 2021-07-01

## 2021-07-01 RX ORDER — MONTELUKAST SODIUM 10 MG/1
10 TABLET ORAL AT BEDTIME
Qty: 90 TABLET | Refills: 1 | Status: SHIPPED | OUTPATIENT
Start: 2021-07-01 | End: 2021-12-15

## 2021-07-02 ASSESSMENT — ASTHMA QUESTIONNAIRES: ACT_TOTALSCORE: 25

## 2021-07-06 ENCOUNTER — VIRTUAL VISIT (OUTPATIENT)
Dept: PSYCHOLOGY | Facility: CLINIC | Age: 51
End: 2021-07-06
Payer: COMMERCIAL

## 2021-07-06 DIAGNOSIS — F43.22 ADJUSTMENT DISORDER WITH ANXIETY: Primary | ICD-10-CM

## 2021-07-06 PROCEDURE — 90834 PSYTX W PT 45 MINUTES: CPT | Mod: 95 | Performed by: SOCIAL WORKER

## 2021-07-07 NOTE — PROGRESS NOTES
Progress Note    Patient Name: Nick Monitel  Date: 7/6/21         Service Type: Family with client present      Session Start Time: 11 AM  session End Time: 11:50 AM     Session Length: 50    Session #: 5    Attendees: Client and Spouse / Significant Other    Service Modality:  Video Visit:      Provider verified identity through the following two step process.  Patient provided:  Patient is known previously to provider and Patient was verified at admission/transfer    Telemedicine Visit: The patient's condition can be safely assessed and treated via synchronous audio and visual telemedicine encounter.      Reason for Telemedicine Visit: Services only offered telehealth    Originating Site (Patient Location): Patient's home    Distant Site (Provider Location): Provider Remote Setting    Consent:  The patient/guardian has verbally consented to: the potential risks and benefits of telemedicine (video visit) versus in person care; bill my insurance or make self-payment for services provided; and responsibility for payment of non-covered services.     Patient would like the video invitation sent by:  My Chart    Mode of Communication:  Video Conference via Amwell    As the provider I attest to compliance with applicable laws and regulations related to telemedicine.     Treatment Plan Last Reviewed: In process; update plan and CGI 8/21  PHQ-9 / DAPHNE-7 : Today; reporting minimal depression/ moderate anxiety    DATA  Interactive Complexity: No  Crisis: No       Progress Since Last Session (Related to Symptoms / Goals / Homework):   Symptoms: Establishing baseline for therapy    Homework: Did not complete; consistently states he doesn't remember to do it .       Episode of Care Goals: Minimal progress - PREPARATION (Decided to change - considering how); Intervened by negotiating a change plan and determining options / strategies for behavior change, identifying triggers,  "exploring social supports, and working towards setting a date to begin behavior change.  Y reporting he feels they have backslided. Stress due to looking for houses. B more critical and Y having difficulty setting limits. Some pointed discussion re B depending on Y to help him recall hw. Asked B to  reflect on what message he is sending to Y when he doesn't follow through.   Current / Ongoing Stressors and Concerns:   Seeking joint therapy to address longstanding issues with trust and intimacy.  Each agreeing to establish individual therapy concurrent with couples work.  Client himself seeking a transfer from previous individual therapist hoping to work with someone with a more directive style.     Treatment Objective(s) Addressed in This Session:   Obtaining information related to their history of the relationship, past and current concerns.  Building rapport and beginning to explore treatment goals.  Y: Expresses concern that spouse is controlling and has a temper which results in feeling shamed and shut down.  Wishes partner would drink less.  Sees that he has a pattern of over accommodation and difficulty setting limits because he does not want to hurt client's feelings.  B: Realizes he has an anger problem he would like to work more on an individual therapy having a quick temper much like most of his family of origin members.  Realizes he can be overly critical and negative of partner and needy.  He feels he is drastically reduced his drinking and having 2-5 white claws most evenings.  Ongoing: Y set boundaries around asking B to not take out temper/irritation on him and to listen when he says No.  B also asked Y to listen when he says no. They acknowledge social isolation which can put pressure on the relationship.  Today:  Each starts their own 1.1 therapy this month.  Encouraging them to \"stay in their own lanes\" and observe and notice when they make decisions to not work on boundary setting. " "   Intervention:   Past:  for 1 year together for 9 years.  Client 51 and spouse 36.  Met in [facility] at a bar.  Moved in together in January 2020.  Spouse relates that client is anxious and then tends to lash out at him and can get mean.  He feels his confidence has been eroded in the relationship and that he has trust issues.  Both agree their intimacy is lacking and they would like to rebuild trust and regain intimacy.  Client agrees he can be critical and negative and also has a history of alcohol abuse.  Client reports that he has reduced his alcohol use to 3-6 seltzer's over a weekend day.  He states his alcohol use can be daily or he can stop for a month that it is \"off-and-on\".  Understands that he will need to be open to looking at this as it impacts his intimacy in his relationship as well as his own mood and self-care.  Both deny issues of physical abuse in the relationship.  Both feel committed to working through issues both jointly and individually. B very connected to gparents who basically raised him while his single parent mother worked. Not close with bio dad nor older brother.Few relational memories with them. Did not go to parents for support; only gparents. Mother became more critical/negative after he got into trouble at age 14-16 and later when he got his gf pregnant at 19. She has remained this way with him. Asked him to reflect on how control and anger issues became issues for himself.   Today: assessing level of motivation to work on relationship. B coping with stress by drinking more at times and staying busy; Y with solitary  U tube viewing. Talked about ways to balance need for alone time with quality time together.  Both can over use avoidance.   ASSESSMENT: Current Emotional / Mental Status (status of significant symptoms):   Risk status (Self / Other harm or suicidal ideation)   Patient denies current fears or concerns for personal safety.   Patient denies current or recent " suicidal ideation or behaviors.   Patient denies current or recent homicidal ideation or behaviors.   Patient denies current or recent self injurious behavior or ideation.   Patient denies other safety concerns.   Patient reports there has been no change in risk factors since their last session.     Patient reports there has been no change in protective factors since their last session.     Recommended that patient call 911 or go to the local ED should there be a change in any of these risk factors.     Appearance:   Appropriate    Eye Contact:   Good    Psychomotor Behavior: Normal    Attitude:   Cooperative  Interested Friendly Pleasant   Orientation:   All   Speech    Rate / Production: Normal/ Responsive Normal     Volume:  Normal    Mood:    Anxious  Depressed    Affect:    Appropriate    Thought Content:  Clear    Thought Form:  Coherent  Goal Directed  Logical    Insight:    Good  and Intellectual Insight     Medication Review:   No current psychiatric medications prescribed     Medication Compliance:   NA     Changes in Health Issues:   None reported     Chemical Use Review:   Substance Use: Chemical use reviewed,  concerns identified and will continue to be monitored in this episode of care.     Tobacco Use: No current tobacco use.      Diagnosis:  1. Adjustment disorder with anxiety        Collateral Reports Completed:   Not Applicable    PLAN: (Patient Tasks / Therapist Tasks / Other  Each accepted referral to intake to establish care with individual therapist within [facility].  Will cont with fam of o work.  Both will work on respecting boundaries discussed today.  B will do relfection hw      Divina Tong, LICSW 7/6/2021                                                         ______________________________________________________________________    Treatment Plan    Patient's Name: Nick Montiel  YOB: 1970    Date: April 27, 2021    DSM5 Diagnoses:  Adjustment Disorders  309.24 (F43.22) With anxiety  Psychosocial / Contextual Factors: Negative patterns in relationship have eroded trust and intimacy.  Client has been involved in the past with harm reduction groups and is continuing to look at his alcohol use.  WHODAS:     Referral / Collaboration:  The following referral(s) will be initiated: Each will engage in individual therapy.    Anticipated number of session or this episode of care: Evaluate every 90 days      MeasurableTreatment Goal(s) related to diagnosis / functional impairment(s)  Goal 1: Patient will work to improve trust and relationship by improving communication    I will know I've met my goal when I am managing my mood and the communication better.      Objective #A (Patient Action)    Patient will Be able to identify longstanding patterns in thinking feeling and behavior that negatively impact ability to communicate directly and respectfully.  Status: New     Intervention(s)  Therapist will Teach assertiveness skills, CBT, DBT.    Objective #B  Patient will Along with partner identify activities and behaviors that build trust and closeness.  Status: New     Intervention(s)  Therapist will Facilitate process and encourage change, provide resources and material to improve self awareness.    Objective #C  Patient will Continue to monitor in an ongoing way as use of alcohol and make a determination to assist in making change..  Status: New     Intervention(s)  Therapist will Encourage and support efforts, provide resources.          Patient has reviewed and agreed to the above plan.      LETICIA Cordoba  April 27, 2021

## 2021-07-22 ENCOUNTER — VIRTUAL VISIT (OUTPATIENT)
Dept: PSYCHOLOGY | Facility: CLINIC | Age: 51
End: 2021-07-22
Payer: COMMERCIAL

## 2021-07-22 DIAGNOSIS — F43.22 ADJUSTMENT DISORDER WITH ANXIETY: Primary | ICD-10-CM

## 2021-07-22 PROCEDURE — 90834 PSYTX W PT 45 MINUTES: CPT | Mod: 95 | Performed by: MARRIAGE & FAMILY THERAPIST

## 2021-07-28 NOTE — PROGRESS NOTES
Progress Note    Patient Name: Nick Montiel  Date: 7/28/2021         Service Type: Individual      Session Start Time: 2:03PM  Session End Time: 2:48PM     Session Length: 45    Session #: 1st with this writer    Attendees: Client attended alone    Service Modality:  Video Visit:    Telemedicine Visit: The patient's condition can be safely assessed and treated via synchronous audio and visual telemedicine encounter.      Reason for Telemedicine Visit: Services only offered telehealth    Originating Site (Patient Location): Patient's home    Distant Site (Provider Location): Provider Remote Setting    Consent:  The patient/guardian has verbally consented to: the potential risks and benefits of telemedicine (video visit) versus in person care; bill my insurance or make self-payment for services provided; and responsibility for payment of non-covered services.     Patient would like the video invitation sent by: Send to e-mail at: [email or web address]     Mode of Communication:  Video Conference via Amwell    As the provider I attest to compliance with applicable laws and regulations related to telemedicine.     Treatment Plan Last Reviewed: Next session    DATA  Interactive Complexity: No  Crisis: No       Progress Since Last Session (Related to Symptoms / Goals / Homework):   Symptoms: first session    Homework: need to review      Episode of Care Goals: Satisfactory progress - PREPARATION (Decided to change - considering how); Intervened by negotiating a change plan and determining options / strategies for behavior change, identifying triggers, exploring social supports, and working towards setting a date to begin behavior change     Current / Ongoing Stressors and Concerns:   Patient shares history for new therapist.     Treatment Objective(s) Addressed in This Session:   Discuss motivation ambivalence about making a behavior change     Intervention:   CBT  and mindfulness       ASSESSMENT: Current Emotional / Mental Status (status of significant symptoms):   Risk status (Self / Other harm or suicidal ideation)   Patient denies current fears or concerns for personal safety.   Patient denies current or recent suicidal ideation or behaviors.   Patient denies current or recent homicidal ideation or behaviors.   Patient denies current or recent self injurious behavior or ideation.   Patient denies other safety concerns.   Patient reports there has been no change in risk factors since their last session.     Patient reports there has been no change in protective factors since their last session.     Recommended that patient call 911 or go to the local ED should there be a change in any of these risk factors.     Appearance:   Appropriate    Eye Contact:   Good    Psychomotor Behavior: Normal    Attitude:   Cooperative  Interested   Orientation:   All   Speech    Rate / Production: Talkative    Volume:  Normal    Mood:    Normal   Affect:    Appropriate    Thought Content:  Clear  Rumination    Thought Form:  Coherent  Logical    Insight:    Fair      Medication Review:   No current psychiatric medications prescribed     Medication Compliance:   NA     Changes in Health Issues:   None reported     Chemical Use Review:   Substance Use: Chemical use reviewed, no active concerns identified      Tobacco Use: No current tobacco use.      Diagnosis:  1. Adjustment disorder with anxiety        Collateral Reports Completed:   Not Applicable    PLAN: (Patient Tasks / Therapist Tasks / Other)  Homework: TBD continue gathering history/establishing goals      LESLIE Lincoln  July 28, 2021    ______________________________________________________________________    Treatment Plan    Patient's Name: Nick Montiel  YOB: 1970    Date: 7/28/2021    DSM5 Diagnoses: Substance-Related & Addictive Disorders Alcohol Use Disorder   305.00 (F10.10) Mild continued  use  Psychosocial / Contextual Factors:  Individual Factors has had problems with alcohol use his adult lifetime, Family Factors Reports a negative relationship with mom and does not speak with dad.  His current partner is concerend about his alcohol use and his mood swings that cause anger,  and Economic Factors currently works as a  and is economically comfortable   WHODAS: TBD    Referral / Collaboration:  The following referral(s) will be initiated: Group support to limit alcohol use.[email or web address] or contact [facility]     Anticipated number of session or this episode of care: 10-20      MeasurableTreatment Goal(s) related to diagnosis / functional impairment(s)  Goal 1: Patient will not give up easily    I will know I've met my goal when I am finishing what I started, and completed goals.      Objective #A (Patient Action)    Patient will Identify negative self-talk and behaviors: challenge core beliefs, myths, and actions.  Status: Continued - Date(s):7/28/2021     Intervention(s)  Therapist will assign homework to engage in cognitive restructuring  provide support to critically look at how negative beliefs keep you from moving forward .    Objective #B  Patient will identify core beliefs and the times in your life that originally created beliefs.  Status: Continued - Date(s):7/28/2021     Intervention(s)  Therapist will assign homework to challenge core beliefs  provide educational materials on core beliefs and lifetime patterns  teach how to challenge core beliefs.    Objective #C  Patient will will be working towards his goals .  Status: Continued - Date(s): 7/28/2021    Intervention(s)  Therapist will assign homework to continue to use skills from therapy  provide support to negotiate what works and what patient continues to struggle with.        Patient has reviewed and agreed to the above plan.      LESLIE Lincoln  July 28, 2021

## 2021-08-04 ENCOUNTER — VIRTUAL VISIT (OUTPATIENT)
Dept: PSYCHOLOGY | Facility: CLINIC | Age: 51
End: 2021-08-04
Payer: COMMERCIAL

## 2021-08-04 DIAGNOSIS — F43.22 ADJUSTMENT DISORDER WITH ANXIETY: Primary | ICD-10-CM

## 2021-08-04 PROCEDURE — 90834 PSYTX W PT 45 MINUTES: CPT | Mod: 95 | Performed by: MARRIAGE & FAMILY THERAPIST

## 2021-08-04 ASSESSMENT — ANXIETY QUESTIONNAIRES
2. NOT BEING ABLE TO STOP OR CONTROL WORRYING: SEVERAL DAYS
GAD7 TOTAL SCORE: 5
3. WORRYING TOO MUCH ABOUT DIFFERENT THINGS: SEVERAL DAYS
4. TROUBLE RELAXING: SEVERAL DAYS
GAD7 TOTAL SCORE: 5
GAD7 TOTAL SCORE: 5
7. FEELING AFRAID AS IF SOMETHING AWFUL MIGHT HAPPEN: NOT AT ALL
7. FEELING AFRAID AS IF SOMETHING AWFUL MIGHT HAPPEN: NOT AT ALL
5. BEING SO RESTLESS THAT IT IS HARD TO SIT STILL: NOT AT ALL
1. FEELING NERVOUS, ANXIOUS, OR ON EDGE: SEVERAL DAYS
8. IF YOU CHECKED OFF ANY PROBLEMS, HOW DIFFICULT HAVE THESE MADE IT FOR YOU TO DO YOUR WORK, TAKE CARE OF THINGS AT HOME, OR GET ALONG WITH OTHER PEOPLE?: NOT DIFFICULT AT ALL
6. BECOMING EASILY ANNOYED OR IRRITABLE: SEVERAL DAYS

## 2021-08-04 ASSESSMENT — PATIENT HEALTH QUESTIONNAIRE - PHQ9
SUM OF ALL RESPONSES TO PHQ QUESTIONS 1-9: 6
SUM OF ALL RESPONSES TO PHQ QUESTIONS 1-9: 6
10. IF YOU CHECKED OFF ANY PROBLEMS, HOW DIFFICULT HAVE THESE PROBLEMS MADE IT FOR YOU TO DO YOUR WORK, TAKE CARE OF THINGS AT HOME, OR GET ALONG WITH OTHER PEOPLE: NOT DIFFICULT AT ALL

## 2021-08-05 ASSESSMENT — PATIENT HEALTH QUESTIONNAIRE - PHQ9: SUM OF ALL RESPONSES TO PHQ QUESTIONS 1-9: 6

## 2021-08-05 ASSESSMENT — ANXIETY QUESTIONNAIRES: GAD7 TOTAL SCORE: 5

## 2021-08-06 NOTE — PROGRESS NOTES
Progress Note    Patient Name: Nick Montiel  Date: 8/4/2021         Service Type: Individual      Session Start Time: 2:03PM  Session End Time: 2:48PM     Session Length: 45    Session #: 2nd with this writer    Attendees: Client attended alone    Service Modality:  Video Visit:    Telemedicine Visit: The patient's condition can be safely assessed and treated via synchronous audio and visual telemedicine encounter.      Reason for Telemedicine Visit: Services only offered telehealth    Originating Site (Patient Location): Patient's home    Distant Site (Provider Location): Provider Remote Setting    Consent:  The patient/guardian has verbally consented to: the potential risks and benefits of telemedicine (video visit) versus in person care; bill my insurance or make self-payment for services provided; and responsibility for payment of non-covered services.     Patient would like the video invitation sent by: Send to e-mail at: [email or web address]     Mode of Communication:  Video Conference via Amwell    As the provider I attest to compliance with applicable laws and regulations related to telemedicine.     Treatment Plan Last Reviewed: Next session    DATA  Interactive Complexity: No  Crisis: No       Progress Since Last Session (Related to Symptoms / Goals / Homework):   Symptoms: first session    Homework: need to review      Episode of Care Goals: Satisfactory progress - PREPARATION (Decided to change - considering how); Intervened by negotiating a change plan and determining options / strategies for behavior change, identifying triggers, exploring social supports, and working towards setting a date to begin behavior change     Current / Ongoing Stressors and Concerns:   Patient has ended his relationship with his . Processed this break up.     Treatment Objective(s) Addressed in This Session:   Discuss motivation ambivalence about making a  behavior change     Intervention:   CBT and mindfulness       ASSESSMENT: Current Emotional / Mental Status (status of significant symptoms):   Risk status (Self / Other harm or suicidal ideation)   Patient denies current fears or concerns for personal safety.   Patient denies current or recent suicidal ideation or behaviors.   Patient denies current or recent homicidal ideation or behaviors.   Patient denies current or recent self injurious behavior or ideation.   Patient denies other safety concerns.   Patient reports there has been no change in risk factors since their last session.     Patient reports there has been no change in protective factors since their last session.     Recommended that patient call 911 or go to the local ED should there be a change in any of these risk factors.     Appearance:   Appropriate    Eye Contact:   Good    Psychomotor Behavior: Normal    Attitude:   Cooperative  Interested   Orientation:   All   Speech    Rate / Production: Talkative    Volume:  Normal    Mood:    Normal   Affect:    Appropriate    Thought Content:  Clear  Rumination    Thought Form:  Coherent  Logical    Insight:    Fair      Medication Review:   No current psychiatric medications prescribed     Medication Compliance:   NA     Changes in Health Issues:   None reported     Chemical Use Review:   Substance Use: Chemical use reviewed, no active concerns identified      Tobacco Use: No current tobacco use.      Diagnosis:  1. Adjustment disorder with anxiety        Collateral Reports Completed:   Not Applicable    PLAN: (Patient Tasks / Therapist Tasks / Other)  Homework: Consider uncoupling counseling with couples therapist.      Catia Willis LM  August 4, 2021    ______________________________________________________________________    Treatment Plan    Patient's Name: Nick Montiel  YOB: 1970    Date: 7/28/2021    DSM5 Diagnoses: Substance-Related & Addictive Disorders Alcohol  Use Disorder   305.00 (F10.10) Mild continued use  Psychosocial / Contextual Factors:  Individual Factors has had problems with alcohol use his adult lifetime, Family Factors Reports a negative relationship with mom and does not speak with dad.  His current partner is concerend about his alcohol use and his mood swings that cause anger,  and Economic Factors currently works as a  and is economically comfortable   WHODAS: TBD    Referral / Collaboration:  The following referral(s) will be initiated: Group support to limit alcohol use.[email or web address] or contact [facility]     Anticipated number of session or this episode of care: 10-20      MeasurableTreatment Goal(s) related to diagnosis / functional impairment(s)  Goal 1: Patient will not give up easily    I will know I've met my goal when I am finishing what I started, and completed goals.      Objective #A (Patient Action)    Patient will Identify negative self-talk and behaviors: challenge core beliefs, myths, and actions.  Status: Continued - Date(s):7/28/2021     Intervention(s)  Therapist will assign homework to engage in cognitive restructuring  provide support to critically look at how negative beliefs keep you from moving forward .    Objective #B  Patient will identify core beliefs and the times in your life that originally created beliefs.  Status: Continued - Date(s):7/28/2021     Intervention(s)  Therapist will assign homework to challenge core beliefs  provide educational materials on core beliefs and lifetime patterns  teach how to challenge core beliefs.    Objective #C  Patient will will be working towards his goals .  Status: Continued - Date(s): 7/28/2021    Intervention(s)  Therapist will assign homework to continue to use skills from therapy  provide support to negotiate what works and what patient continues to struggle with.        Patient has reviewed and agreed to the above plan.      LESLIE Lincoln  July 28,  2021  Answers for HPI/ROS submitted by the patient on 8/4/2021  If you checked off any problems, how difficult have these problems made it for you to do your work, take care of things at home, or get along with other people?: Not difficult at all  PHQ9 TOTAL SCORE: 6  DAPHNE 7 TOTAL SCORE: 5

## 2021-08-17 ENCOUNTER — VIRTUAL VISIT (OUTPATIENT)
Dept: PSYCHOLOGY | Facility: CLINIC | Age: 51
End: 2021-08-17
Payer: COMMERCIAL

## 2021-08-17 DIAGNOSIS — F43.23 ADJUSTMENT DISORDER WITH MIXED ANXIETY AND DEPRESSED MOOD: Primary | ICD-10-CM

## 2021-08-17 PROCEDURE — 90834 PSYTX W PT 45 MINUTES: CPT | Mod: 95 | Performed by: MARRIAGE & FAMILY THERAPIST

## 2021-08-17 ASSESSMENT — PATIENT HEALTH QUESTIONNAIRE - PHQ9
SUM OF ALL RESPONSES TO PHQ QUESTIONS 1-9: 4
10. IF YOU CHECKED OFF ANY PROBLEMS, HOW DIFFICULT HAVE THESE PROBLEMS MADE IT FOR YOU TO DO YOUR WORK, TAKE CARE OF THINGS AT HOME, OR GET ALONG WITH OTHER PEOPLE: NOT DIFFICULT AT ALL
SUM OF ALL RESPONSES TO PHQ QUESTIONS 1-9: 4

## 2021-08-17 NOTE — PROGRESS NOTES
Answers for HPI/ROS submitted by the patient on 8/17/2021  If you checked off any problems, how difficult have these problems made it for you to do your work, take care of things at home, or get along with other people?: Not difficult at all  PHQ9 TOTAL SCORE: 4                                                Progress Note    Patient Name: Nick Montiel  Date: 8/17/2021         Service Type: Individual      Session Start Time: 2:03PM  Session End Time: 2:48PM     Session Length: 45    Session #: 3    Attendees: Client attended alone    Service Modality:  Video Visit:    Telemedicine Visit: The patient's condition can be safely assessed and treated via synchronous audio and visual telemedicine encounter.      Reason for Telemedicine Visit: Services only offered telehealth    Originating Site (Patient Location): Patient's home    Distant Site (Provider Location): Provider Remote Setting    Consent:  The patient/guardian has verbally consented to: the potential risks and benefits of telemedicine (video visit) versus in person care; bill my insurance or make self-payment for services provided; and responsibility for payment of non-covered services.     Patient would like the video invitation sent by: Send to e-mail at: [email or web address]}     Mode of Communication:  Video Conference via [employer]    As the provider I attest to compliance with applicable laws and regulations related to telemedicine.     Treatment Plan Last Reviewed: Next session    DATA  Interactive Complexity: No  Crisis: No       Progress Since Last Session (Related to Symptoms / Goals / Homework):   Symptoms: first session    Homework: need to review      Episode of Care Goals: Satisfactory progress - PREPARATION (Decided to change - considering how); Intervened by negotiating a change plan and determining options / strategies for behavior change, identifying triggers, exploring social supports, and working towards setting a date to begin  behavior change     Current / Ongoing Stressors and Concerns:   Patient is still working on housing. Talked more about his goals of being kinder to people, less negative and picky. He and ex are taking a vacation together. Talked about how he is more practical than his ex, how he wants him to be happy even if it's without him and he is sad because of how much he has meant to him in terms of emotional support.     Treatment Objective(s) Addressed in This Session:   Discuss motivation ambivalence about making a behavior change     Intervention:   CBT and mindfulness       ASSESSMENT: Current Emotional / Mental Status (status of significant symptoms):   Risk status (Self / Other harm or suicidal ideation)   Patient denies current fears or concerns for personal safety.   Patient denies current or recent suicidal ideation or behaviors.   Patient denies current or recent homicidal ideation or behaviors.   Patient denies current or recent self injurious behavior or ideation.   Patient denies other safety concerns.   Patient reports there has been no change in risk factors since their last session.     Patient reports there has been no change in protective factors since their last session.     Recommended that patient call 911 or go to the local ED should there be a change in any of these risk factors.     Appearance:   Appropriate    Eye Contact:   Good    Psychomotor Behavior: Normal    Attitude:   Cooperative  Interested   Orientation:   All   Speech    Rate / Production: Talkative    Volume:  Normal    Mood:    Normal   Affect:    Appropriate    Thought Content:  Clear  Rumination    Thought Form:  Coherent  Logical    Insight:    Fair      Medication Review:   No current psychiatric medications prescribed     Medication Compliance:   NA     Changes in Health Issues:   None reported     Chemical Use Review:   Substance Use: Chemical use reviewed, no active concerns identified      Tobacco Use: No current tobacco use.       Diagnosis:  1. Adjustment disorder with mixed anxiety and depressed mood        Collateral Reports Completed:   Not Applicable    PLAN: (Patient Tasks / Therapist Tasks / Other)  Homework: Continue working on positive mindset. Ask about trip.      Catia Ndiaye LESLIE Willis  August 17, 2021    ______________________________________________________________________    Treatment Plan    Patient's Name: Nick Montiel  YOB: 1970    Date: 7/28/2021    DSM5 Diagnoses: Substance-Related & Addictive Disorders Alcohol Use Disorder   305.00 (F10.10) Mild continued use  Psychosocial / Contextual Factors:  Individual Factors has had problems with alcohol use his adult lifetime, Family Factors Reports a negative relationship with mom and does not speak with dad.  His current partner is concerend about his alcohol use and his mood swings that cause anger,  and Economic Factors currently works as a  and is economically comfortable   WHODAS: TBD    Referral / Collaboration:  The following referral(s) will be initiated: Group support to limit alcohol use.[email or web address] or contact [facility]     Anticipated number of session or this episode of care: 10-20      MeasurableTreatment Goal(s) related to diagnosis / functional impairment(s)  Goal 1: Patient will not give up easily    I will know I've met my goal when I am finishing what I started, and completed goals.      Objective #A (Patient Action)    Patient will Identify negative self-talk and behaviors: challenge core beliefs, myths, and actions.  Status: Continued - Date(s):7/28/2021     Intervention(s)  Therapist will assign homework to engage in cognitive restructuring  provide support to critically look at how negative beliefs keep you from moving forward .    Objective #B  Patient will identify core beliefs and the times in your life that originally created beliefs.  Status: Continued - Date(s):7/28/2021      Intervention(s)  Therapist will assign homework to challenge core beliefs  provide educational materials on core beliefs and lifetime patterns  teach how to challenge core beliefs.    Objective #C  Patient will will be working towards his goals .  Status: Continued - Date(s): 7/28/2021    Intervention(s)  Therapist will assign homework to continue to use skills from therapy  provide support to negotiate what works and what patient continues to struggle with.        Patient has reviewed and agreed to the above plan.      LESLIE Lincoln  July 28, 2021  Answers for HPI/ROS submitted by the patient on 8/4/2021  If you checked off any problems, how difficult have these problems made it for you to do your work, take care of things at home, or get along with other people?: Not difficult at all  PHQ9 TOTAL SCORE: 6  DAPHNE 7 TOTAL SCORE: 5

## 2021-08-18 ASSESSMENT — PATIENT HEALTH QUESTIONNAIRE - PHQ9: SUM OF ALL RESPONSES TO PHQ QUESTIONS 1-9: 4

## 2021-09-02 ENCOUNTER — VIRTUAL VISIT (OUTPATIENT)
Dept: PSYCHOLOGY | Facility: CLINIC | Age: 51
End: 2021-09-02
Payer: COMMERCIAL

## 2021-09-02 DIAGNOSIS — F43.23 ADJUSTMENT DISORDER WITH MIXED ANXIETY AND DEPRESSED MOOD: Primary | ICD-10-CM

## 2021-09-02 PROCEDURE — 90834 PSYTX W PT 45 MINUTES: CPT | Mod: 95 | Performed by: MARRIAGE & FAMILY THERAPIST

## 2021-09-02 NOTE — PROGRESS NOTES
Answers for HPI/ROS submitted by the patient on 8/17/2021  If you checked off any problems, how difficult have these problems made it for you to do your work, take care of things at home, or get along with other people?: Not difficult at all  PHQ9 TOTAL SCORE: 4                                                Progress Note    Patient Name: Nick Montiel  Date: 9/2/2021         Service Type: Individual      Session Start Time: 2:03PM  Session End Time: 2:48PM     Session Length: 45    Session #: 4    Attendees: Client attended alone    Service Modality:  Video Visit:    Telemedicine Visit: The patient's condition can be safely assessed and treated via synchronous audio and visual telemedicine encounter.      Reason for Telemedicine Visit: Services only offered telehealth    Originating Site (Patient Location): Patient's home    Distant Site (Provider Location): Provider Remote Setting    Consent:  The patient/guardian has verbally consented to: the potential risks and benefits of telemedicine (video visit) versus in person care; bill my insurance or make self-payment for services provided; and responsibility for payment of non-covered services.     Patient would like the video invitation sent by: Send to e-mail at: [email or web address]}     Mode of Communication:  Video Conference via [employer]    As the provider I attest to compliance with applicable laws and regulations related to telemedicine.     Treatment Plan Last Reviewed: Next session    DATA  Interactive Complexity: No  Crisis: No       Progress Since Last Session (Related to Symptoms / Goals / Homework):   Symptoms: first session    Homework: need to review      Episode of Care Goals: Satisfactory progress - PREPARATION (Decided to change - considering how); Intervened by negotiating a change plan and determining options / strategies for behavior change, identifying triggers, exploring social supports, and working towards setting a date to begin  behavior change     Current / Ongoing Stressors and Concerns:   Patient signed a lease and is starting to move. He is still processing his divorce. Has been talking to other men but not interested in a relationship.      Treatment Objective(s) Addressed in This Session:   Discuss motivation ambivalence about making a behavior change     Intervention:   CBT and mindfulness       ASSESSMENT: Current Emotional / Mental Status (status of significant symptoms):   Risk status (Self / Other harm or suicidal ideation)   Patient denies current fears or concerns for personal safety.   Patient denies current or recent suicidal ideation or behaviors.   Patient denies current or recent homicidal ideation or behaviors.   Patient denies current or recent self injurious behavior or ideation.   Patient denies other safety concerns.   Patient reports there has been no change in risk factors since their last session.     Patient reports there has been no change in protective factors since their last session.     Recommended that patient call 911 or go to the local ED should there be a change in any of these risk factors.     Appearance:   Appropriate    Eye Contact:   Good    Psychomotor Behavior: Normal    Attitude:   Cooperative  Interested   Orientation:   All   Speech    Rate / Production: Talkative    Volume:  Normal    Mood:    Normal   Affect:    Appropriate    Thought Content:  Clear  Rumination    Thought Form:  Coherent  Logical    Insight:    Fair      Medication Review:   No current psychiatric medications prescribed     Medication Compliance:   NA     Changes in Health Issues:   None reported     Chemical Use Review:   Substance Use: Chemical use reviewed, no active concerns identified      Tobacco Use: No current tobacco use.      Diagnosis:  1. Adjustment disorder with mixed anxiety and depressed mood        Collateral Reports Completed:   Not Applicable    PLAN: (Patient Tasks / Therapist Tasks / Other)  Homework:  Continue working on positive mindset. Move to new place.      Catia Willis, LMFT  September 2, 2021    ______________________________________________________________________    Treatment Plan    Patient's Name: Nick Montiel  YOB: 1970    Date: 7/28/2021    DSM5 Diagnoses: Substance-Related & Addictive Disorders Alcohol Use Disorder   305.00 (F10.10) Mild continued use  Psychosocial / Contextual Factors:  Individual Factors has had problems with alcohol use his adult lifetime, Family Factors Reports a negative relationship with mom and does not speak with dad.  His current partner is concerend about his alcohol use and his mood swings that cause anger,  and Economic Factors currently works as a  and is economically comfortable   WHODAS: TBD    Referral / Collaboration:  The following referral(s) will be initiated: Group support to limit alcohol use.[email or web address] or contact [facility]     Anticipated number of session or this episode of care: 10-20      MeasurableTreatment Goal(s) related to diagnosis / functional impairment(s)  Goal 1: Patient will not give up easily    I will know I've met my goal when I am finishing what I started, and completed goals.      Objective #A (Patient Action)    Patient will Identify negative self-talk and behaviors: challenge core beliefs, myths, and actions.  Status: Continued - Date(s):7/28/2021     Intervention(s)  Therapist will assign homework to engage in cognitive restructuring  provide support to critically look at how negative beliefs keep you from moving forward .    Objective #B  Patient will identify core beliefs and the times in your life that originally created beliefs.  Status: Continued - Date(s):7/28/2021     Intervention(s)  Therapist will assign homework to challenge core beliefs  provide educational materials on core beliefs and lifetime patterns  teach how to challenge core beliefs.    Objective #C  Patient will  will be working towards his goals .  Status: Continued - Date(s): 7/28/2021    Intervention(s)  Therapist will assign homework to continue to use skills from therapy  provide support to negotiate what works and what patient continues to struggle with.        Patient has reviewed and agreed to the above plan.      Catia Willis, LMFT  July 28, 2021  Answers for HPI/ROS submitted by the patient on 8/4/2021  If you checked off any problems, how difficult have these problems made it for you to do your work, take care of things at home, or get along with other people?: Not difficult at all  PHQ9 TOTAL SCORE: 6  DAPHNE 7 TOTAL SCORE: 5

## 2021-09-16 ENCOUNTER — VIRTUAL VISIT (OUTPATIENT)
Dept: PSYCHOLOGY | Facility: CLINIC | Age: 51
End: 2021-09-16
Payer: COMMERCIAL

## 2021-09-16 DIAGNOSIS — F43.23 ADJUSTMENT DISORDER WITH MIXED ANXIETY AND DEPRESSED MOOD: Primary | ICD-10-CM

## 2021-09-16 PROCEDURE — 90834 PSYTX W PT 45 MINUTES: CPT | Mod: 95 | Performed by: MARRIAGE & FAMILY THERAPIST

## 2021-09-16 NOTE — PROGRESS NOTES
Progress Note    Patient Name: Nick Montiel  Date: 9/16/2021         Service Type: Individual      Session Start Time: 12:03PM  Session End Time: 12:48PM     Session Length: 45    Session #: 5    Attendees: Client attended alone    Service Modality:  Video Visit:    Telemedicine Visit: The patient's condition can be safely assessed and treated via synchronous audio and visual telemedicine encounter.      Reason for Telemedicine Visit: Services only offered telehealth    Originating Site (Patient Location): Patient's home    Distant Site (Provider Location): Provider Remote Setting    Consent:  The patient/guardian has verbally consented to: the potential risks and benefits of telemedicine (video visit) versus in person care; bill my insurance or make self-payment for services provided; and responsibility for payment of non-covered services.     Patient would like the video invitation sent by: Send to e-mail at: [email or web address]}     Mode of Communication:  Video Conference via [employer]    As the provider I attest to compliance with applicable laws and regulations related to telemedicine.     Treatment Plan Last Reviewed: Next session    DATA  Interactive Complexity: No  Crisis: No       Progress Since Last Session (Related to Symptoms / Goals / Homework):   Symptoms: Improving client is moving into new place    Homework: Partially completed      Episode of Care Goals: Satisfactory progress - PREPARATION (Decided to change - considering how); Intervened by negotiating a change plan and determining options / strategies for behavior change, identifying triggers, exploring social supports, and working towards setting a date to begin behavior change     Current / Ongoing Stressors and Concerns:   Patient has been moving into new place. He is sleeping at his new place fully now. Still has some items at old place but is working on scaling down things. He and   have been having some minor conflicts but client remains sure of decision. Client is planning to remain friends although his ex still has feelings for him and can't understand why client is trying to hook up.      Treatment Objective(s) Addressed in This Session:   Discuss motivation ambivalence about making a behavior change     Intervention:   CBT and mindfulness       ASSESSMENT: Current Emotional / Mental Status (status of significant symptoms):   Risk status (Self / Other harm or suicidal ideation)   Patient denies current fears or concerns for personal safety.   Patient denies current or recent suicidal ideation or behaviors.   Patient denies current or recent homicidal ideation or behaviors.   Patient denies current or recent self injurious behavior or ideation.   Patient denies other safety concerns.   Patient reports there has been no change in risk factors since their last session.     Patient reports there has been no change in protective factors since their last session.     Recommended that patient call 911 or go to the local ED should there be a change in any of these risk factors.     Appearance:   Appropriate    Eye Contact:   Good    Psychomotor Behavior: Normal    Attitude:   Cooperative  Interested   Orientation:   All   Speech    Rate / Production: Talkative    Volume:  Normal    Mood:    Normal   Affect:    Appropriate    Thought Content:  Clear  Rumination    Thought Form:  Coherent  Logical    Insight:    Fair      Medication Review:   No current psychiatric medications prescribed     Medication Compliance:   NA     Changes in Health Issues:   None reported     Chemical Use Review:   Substance Use: Chemical use reviewed, no active concerns identified      Tobacco Use: No current tobacco use.      Diagnosis:  1. Adjustment disorder with mixed anxiety and depressed mood        Collateral Reports Completed:   Not Applicable    PLAN: (Patient Tasks / Therapist Tasks / Other)  Homework:  Continue to move to new place. Find ways to meet men.      Catia Willis, LMFT  September 16, 2021    ______________________________________________________________________    Treatment Plan    Patient's Name: Nick Montiel  YOB: 1970    Date: 7/28/2021    DSM5 Diagnoses: Substance-Related & Addictive Disorders Alcohol Use Disorder   305.00 (F10.10) Mild continued use  Psychosocial / Contextual Factors:  Individual Factors has had problems with alcohol use his adult lifetime, Family Factors Reports a negative relationship with mom and does not speak with dad.  His current partner is concerend about his alcohol use and his mood swings that cause anger,  and Economic Factors currently works as a  and is economically comfortable   WHODAS: TBD    Referral / Collaboration:  The following referral(s) will be initiated: Group support to limit alcohol use.[email or web address] or contact [facility]     Anticipated number of session or this episode of care: 10-20      MeasurableTreatment Goal(s) related to diagnosis / functional impairment(s)  Goal 1: Patient will not give up easily    I will know I've met my goal when I am finishing what I started, and completed goals.      Objective #A (Patient Action)    Patient will Identify negative self-talk and behaviors: challenge core beliefs, myths, and actions.  Status: Continued - Date(s):7/28/2021     Intervention(s)  Therapist will assign homework to engage in cognitive restructuring  provide support to critically look at how negative beliefs keep you from moving forward .    Objective #B  Patient will identify core beliefs and the times in your life that originally created beliefs.  Status: Continued - Date(s):7/28/2021     Intervention(s)  Therapist will assign homework to challenge core beliefs  provide educational materials on core beliefs and lifetime patterns  teach how to challenge core beliefs.    Objective #C  Patient will  will be working towards his goals .  Status: Continued - Date(s): 7/28/2021    Intervention(s)  Therapist will assign homework to continue to use skills from therapy  provide support to negotiate what works and what patient continues to struggle with.        Patient has reviewed and agreed to the above plan.      Catia Willis, LMFT  July 28, 2021  Answers for HPI/ROS submitted by the patient on 8/4/2021  If you checked off any problems, how difficult have these problems made it for you to do your work, take care of things at home, or get along with other people?: Not difficult at all  PHQ9 TOTAL SCORE: 6  DAPHNE 7 TOTAL SCORE: 5

## 2021-09-30 ENCOUNTER — VIRTUAL VISIT (OUTPATIENT)
Dept: PSYCHOLOGY | Facility: CLINIC | Age: 51
End: 2021-09-30
Payer: COMMERCIAL

## 2021-09-30 DIAGNOSIS — F43.23 ADJUSTMENT DISORDER WITH MIXED ANXIETY AND DEPRESSED MOOD: Primary | ICD-10-CM

## 2021-09-30 PROCEDURE — 90834 PSYTX W PT 45 MINUTES: CPT | Mod: 95 | Performed by: MARRIAGE & FAMILY THERAPIST

## 2021-09-30 NOTE — PROGRESS NOTES
Progress Note    Patient Name: Nick Montiel  Date: 9/30/2021         Service Type: Individual      Session Start Time: 12:03PM  Session End Time: 12:48PM     Session Length: 45    Session #: 6    Attendees: Client attended alone    Service Modality:  Video Visit:    Telemedicine Visit: The patient's condition can be safely assessed and treated via synchronous audio and visual telemedicine encounter.      Reason for Telemedicine Visit: Services only offered telehealth    Originating Site (Patient Location): Patient's home    Distant Site (Provider Location): Provider Remote Setting    Consent:  The patient/guardian has verbally consented to: the potential risks and benefits of telemedicine (video visit) versus in person care; bill my insurance or make self-payment for services provided; and responsibility for payment of non-covered services.     Patient would like the video invitation sent by: Send to e-mail at: [email or web address]     Mode of Communication:  Video Conference via [employer]    As the provider I attest to compliance with applicable laws and regulations related to telemedicine.     Treatment Plan Last Reviewed: Next session    DATA  Interactive Complexity: No  Crisis: No       Progress Since Last Session (Related to Symptoms / Goals / Homework):   Symptoms: Improving client is moving into new place    Homework: Partially completed      Episode of Care Goals: Satisfactory progress - PREPARATION (Decided to change - considering how); Intervened by negotiating a change plan and determining options / strategies for behavior change, identifying triggers, exploring social supports, and working towards setting a date to begin behavior change     Current / Ongoing Stressors and Concerns:   Patient has settled into new place. Next session, he would like to address new issues.     Treatment Objective(s) Addressed in This Session:   Discuss motivation  ambivalence about making a behavior change     Intervention:   CBT and mindfulness       ASSESSMENT: Current Emotional / Mental Status (status of significant symptoms):   Risk status (Self / Other harm or suicidal ideation)   Patient denies current fears or concerns for personal safety.   Patient denies current or recent suicidal ideation or behaviors.   Patient denies current or recent homicidal ideation or behaviors.   Patient denies current or recent self injurious behavior or ideation.   Patient denies other safety concerns.   Patient reports there has been no change in risk factors since their last session.     Patient reports there has been no change in protective factors since their last session.     Recommended that patient call 911 or go to the local ED should there be a change in any of these risk factors.     Appearance:   Appropriate    Eye Contact:   Good    Psychomotor Behavior: Normal    Attitude:   Cooperative  Interested   Orientation:   All   Speech    Rate / Production: Talkative    Volume:  Normal    Mood:    Normal   Affect:    Appropriate    Thought Content:  Clear    Thought Form:  Coherent  Logical    Insight:    Fair      Medication Review:   No current psychiatric medications prescribed     Medication Compliance:   NA     Changes in Health Issues:   None reported     Chemical Use Review:   Substance Use: Chemical use reviewed, no active concerns identified      Tobacco Use: No current tobacco use.      Diagnosis:  1. Adjustment disorder with mixed anxiety and depressed mood        Collateral Reports Completed:   Not Applicable    PLAN: (Patient Tasks / Therapist Tasks / Other)  Homework: Talk about new therapy goals.      Catia Willis, LMFT  September 30, 2021    ______________________________________________________________________    Treatment Plan    Patient's Name: Nick Montiel  YOB: 1970    Date: 7/28/2021    DSM5 Diagnoses: Substance-Related & Addictive  Disorders Alcohol Use Disorder   305.00 (F10.10) Mild continued use  Psychosocial / Contextual Factors:  Individual Factors has had problems with alcohol use his adult lifetime, Family Factors Reports a negative relationship with mom and does not speak with dad.  His current partner is concerend about his alcohol use and his mood swings that cause anger,  and Economic Factors currently works as a  and is economically comfortable   WHODAS: TBD    Referral / Collaboration:  The following referral(s) will be initiated: Group support to limit alcohol [email or web address] or contact [facility]     Anticipated number of session or this episode of care: 10-20      MeasurableTreatment Goal(s) related to diagnosis / functional impairment(s)  Goal 1: Patient will not give up easily    I will know I've met my goal when I am finishing what I started, and completed goals.      Objective #A (Patient Action)    Patient will Identify negative self-talk and behaviors: challenge core beliefs, myths, and actions.  Status: Continued - Date(s):7/28/2021     Intervention(s)  Therapist will assign homework to engage in cognitive restructuring  provide support to critically look at how negative beliefs keep you from moving forward .    Objective #B  Patient will identify core beliefs and the times in your life that originally created beliefs.  Status: Continued - Date(s):7/28/2021     Intervention(s)  Therapist will assign homework to challenge core beliefs  provide educational materials on core beliefs and lifetime patterns  teach how to challenge core beliefs.    Objective #C  Patient will will be working towards his goals .  Status: Continued - Date(s): 7/28/2021    Intervention(s)  Therapist will assign homework to continue to use skills from therapy  provide support to negotiate what works and what patient continues to struggle with.        Patient has reviewed and agreed to the above plan.      Catia Ndiaye  [place], LMFT  July 28, 2021  Answers for HPI/ROS submitted by the patient on 8/4/2021  If you checked off any problems, how difficult have these problems made it for you to do your work, take care of things at home, or get along with other people?: Not difficult at all  PHQ9 TOTAL SCORE: 6  DAPHNE 7 TOTAL SCORE: 5

## 2021-10-10 ENCOUNTER — HEALTH MAINTENANCE LETTER (OUTPATIENT)
Age: 51
End: 2021-10-10

## 2021-10-19 ENCOUNTER — VIRTUAL VISIT (OUTPATIENT)
Dept: PSYCHOLOGY | Facility: CLINIC | Age: 51
End: 2021-10-19
Payer: COMMERCIAL

## 2021-10-19 DIAGNOSIS — F43.23 ADJUSTMENT DISORDER WITH MIXED ANXIETY AND DEPRESSED MOOD: Primary | ICD-10-CM

## 2021-10-19 PROCEDURE — 90834 PSYTX W PT 45 MINUTES: CPT | Mod: 95 | Performed by: MARRIAGE & FAMILY THERAPIST

## 2021-10-20 NOTE — PROGRESS NOTES
Progress Note    Patient Name: Nick Montiel  Date: 10/19/2021         Service Type: Individual      Session Start Time: 12:03PM  Session End Time: 12:48PM     Session Length: 45    Session #: 7    Attendees: Client attended alone    Service Modality:  Video Visit:    Telemedicine Visit: The patient's condition can be safely assessed and treated via synchronous audio and visual telemedicine encounter.      Reason for Telemedicine Visit: Services only offered telehealth    Originating Site (Patient Location): Patient's home    Distant Site (Provider Location): Provider Remote Setting    Consent:  The patient/guardian has verbally consented to: the potential risks and benefits of telemedicine (video visit) versus in person care; bill my insurance or make self-payment for services provided; and responsibility for payment of non-covered services.     Patient would like the video invitation sent by: Send to e-mail at: [email or web address]}     Mode of Communication:  Video Conference via [employer]    As the provider I attest to compliance with applicable laws and regulations related to telemedicine.     Treatment Plan Last Reviewed: Next session    DATA  Interactive Complexity: No  Crisis: No       Progress Since Last Session (Related to Symptoms / Goals / Homework):   Symptoms: Improving client is moving into new place    Homework: Partially completed      Episode of Care Goals: Satisfactory progress - PREPARATION (Decided to change - considering how); Intervened by negotiating a change plan and determining options / strategies for behavior change, identifying triggers, exploring social supports, and working towards setting a date to begin behavior change     Current / Ongoing Stressors and Concerns:   Client talks about the men he has been meeting. He is eager to be sexual with someone as well as start a new relationship. His ex is having a boundary with him and  they will limit their communication to plans involving their dog. This has left client feeling lonely and we discuss increasing his social support in addition to dating. Client is interested in learning to be more patient in a new relationship. States he is reactive and can become impatient and easily frustrated. Encouraged client to begin exercising new strategies in these dating situations where he is being intolerant and at times reactive. Client to work on pausing, ID what he feels, what he needs and how to express this in a respectful manner.      Treatment Objective(s) Addressed in This Session:   Discuss motivation ambivalence about making a behavior change   Encourage feelings identification     Intervention:   CBT and mindfulness       ASSESSMENT: Current Emotional / Mental Status (status of significant symptoms):   Risk status (Self / Other harm or suicidal ideation)   Patient denies current fears or concerns for personal safety.   Patient denies current or recent suicidal ideation or behaviors.   Patient denies current or recent homicidal ideation or behaviors.   Patient denies current or recent self injurious behavior or ideation.   Patient denies other safety concerns.   Patient reports there has been no change in risk factors since their last session.     Patient reports there has been no change in protective factors since their last session.     Recommended that patient call 911 or go to the local ED should there be a change in any of these risk factors.     Appearance:   Appropriate    Eye Contact:   Good    Psychomotor Behavior: Normal    Attitude:   Cooperative  Interested   Orientation:   All   Speech    Rate / Production: Talkative    Volume:  Normal    Mood:    Anxious  Irritable    Affect:    Appropriate    Thought Content:  Clear    Thought Form:  Coherent  Logical    Insight:    Fair      Medication Review:   No current psychiatric medications prescribed     Medication  Compliance:   NA     Changes in Health Issues:   None reported     Chemical Use Review:   Substance Use: Chemical use reviewed, no active concerns identified      Tobacco Use: No current tobacco use.      Diagnosis:  1. Adjustment disorder with mixed anxiety and depressed mood        Collateral Reports Completed:   Not Applicable    PLAN: (Patient Tasks / Therapist Tasks / Other)  Homework: Client to work on pausing (count to 10), identify feelings, determine what he needs and determine how best to ask for it respectfully. Also to talk next time about client's need for control.      Catia Willis, LMFT  October 19, 2021    ______________________________________________________________________    Treatment Plan    Patient's Name: Ncik Montiel  YOB: 1970    Date: 7/28/2021    DSM5 Diagnoses: Substance-Related & Addictive Disorders Alcohol Use Disorder   305.00 (F10.10) Mild continued use  Psychosocial / Contextual Factors:  Individual Factors has had problems with alcohol use his adult lifetime, Family Factors Reports a negative relationship with mom and does not speak with dad.  His current partner is concerend about his alcohol use and his mood swings that cause anger,  and Economic Factors currently works as a  and is economically comfortable   WHODAS: TBD    Referral / Collaboration:  The following referral(s) will be initiated: Group support to limit alcohol [email or web address] or contact [facility]     Anticipated number of session or this episode of care: 10-20      MeasurableTreatment Goal(s) related to diagnosis / functional impairment(s)  Goal 1: Patient will not give up easily    I will know I've met my goal when I am finishing what I started, and completed goals.      Objective #A (Patient Action)    Patient will Identify negative self-talk and behaviors: challenge core beliefs, myths, and actions.  Status: Continued - Date(s):7/28/2021      Intervention(s)  Therapist will assign homework to engage in cognitive restructuring  provide support to critically look at how negative beliefs keep you from moving forward .    Objective #B  Patient will identify core beliefs and the times in your life that originally created beliefs.  Status: Continued - Date(s):7/28/2021     Intervention(s)  Therapist will assign homework to challenge core beliefs  provide educational materials on core beliefs and lifetime patterns  teach how to challenge core beliefs.    Objective #C  Patient will will be working towards his goals .  Status: Continued - Date(s): 7/28/2021    Intervention(s)  Therapist will assign homework to continue to use skills from therapy  provide support to negotiate what works and what patient continues to struggle with.        Patient has reviewed and agreed to the above plan.      LESLIE Lincoln  July 28, 2021  Answers for HPI/ROS submitted by the patient on 8/4/2021  If you checked off any problems, how difficult have these problems made it for you to do your work, take care of things at home, or get along with other people?: Not difficult at all  PHQ9 TOTAL SCORE: 6  DAPHNE 7 TOTAL SCORE: 5

## 2021-10-27 ENCOUNTER — VIRTUAL VISIT (OUTPATIENT)
Dept: PSYCHOLOGY | Facility: CLINIC | Age: 51
End: 2021-10-27
Payer: COMMERCIAL

## 2021-10-27 DIAGNOSIS — F43.23 ADJUSTMENT DISORDER WITH MIXED ANXIETY AND DEPRESSED MOOD: Primary | ICD-10-CM

## 2021-10-27 PROCEDURE — 90834 PSYTX W PT 45 MINUTES: CPT | Mod: 95 | Performed by: MARRIAGE & FAMILY THERAPIST

## 2021-10-27 NOTE — PROGRESS NOTES
Progress Note    Patient Name: Nick Montiel  Date: 10/27/2021         Service Type: Individual      Session Start Time: 12:03PM  Session End Time: 12:48PM     Session Length: 45    Session #: 8    Attendees: Client attended alone    Service Modality:  Video Visit:    Telemedicine Visit: The patient's condition can be safely assessed and treated via synchronous audio and visual telemedicine encounter.      Reason for Telemedicine Visit: Services only offered telehealth    Originating Site (Patient Location): Patient's home    Distant Site (Provider Location): Provider Remote Setting    Consent:  The patient/guardian has verbally consented to: the potential risks and benefits of telemedicine (video visit) versus in person care; bill my insurance or make self-payment for services provided; and responsibility for payment of non-covered services.     Patient would like the video invitation sent by: Send to e-mail at: [email or web address]}     Mode of Communication:  Video Conference via [employer]    As the provider I attest to compliance with applicable laws and regulations related to telemedicine.     Treatment Plan Last Reviewed: 10/27/2021    DATA  Interactive Complexity: No  Crisis: No       Progress Since Last Session (Related to Symptoms / Goals / Homework):   Symptoms: Improving client is moving into new place    Homework: Partially completed      Episode of Care Goals: Satisfactory progress - PREPARATION (Decided to change - considering how); Intervened by negotiating a change plan and determining options / strategies for behavior change, identifying triggers, exploring social supports, and working towards setting a date to begin behavior change     Current / Ongoing Stressors and Concerns:  Client talks about the men he has been meeting and discussed how he isn't sure what he wants right now out of these relationships. He was planning to ask one of the  men he's seeing where he sees their relationship going. Challenged him on resisting control and focusing more on being in the moment. Especially in light of him not knowing what he wants. Also talked about his friendship with Alvaro that has been nice for client.     Treatment Objective(s) Addressed in This Session:   Discuss motivation ambivalence about making a behavior change   Encourage feelings identification     Intervention:   CBT and mindfulness       ASSESSMENT: Current Emotional / Mental Status (status of significant symptoms):   Risk status (Self / Other harm or suicidal ideation)   Patient denies current fears or concerns for personal safety.   Patient denies current or recent suicidal ideation or behaviors.   Patient denies current or recent homicidal ideation or behaviors.   Patient denies current or recent self injurious behavior or ideation.   Patient denies other safety concerns.   Patient reports there has been no change in risk factors since their last session.     Patient reports there has been no change in protective factors since their last session.     Recommended that patient call 911 or go to the local ED should there be a change in any of these risk factors.     Appearance:   Appropriate    Eye Contact:   Good    Psychomotor Behavior: Normal    Attitude:   Cooperative  Interested   Orientation:   All   Speech    Rate / Production: Talkative    Volume:  Normal    Mood:    Anxious  Irritable    Affect:    Appropriate    Thought Content:  Clear    Thought Form:  Coherent  Logical    Insight:    Fair      Medication Review:   No current psychiatric medications prescribed     Medication Compliance:   NA     Changes in Health Issues:   None reported     Chemical Use Review:   Substance Use: Chemical use reviewed, no active concerns identified      Tobacco Use: No current tobacco use.      Diagnosis:  1. Adjustment disorder with mixed anxiety and depressed mood        Collateral Reports  Completed:   Not Applicable    PLAN: (Patient Tasks / Therapist Tasks / Other)  Homework: Resist seeking control in relationships until he knows more of what he wants.      Catia Willis, LMFT  October 27, 2021    ______________________________________________________________________    Treatment Plan    Patient's Name: Nick Montiel  YOB: 1970    Date: 10/[id number]    DSM5 Diagnoses: Substance-Related & Addictive Disorders Alcohol Use Disorder   305.00 (F10.10) Mild continued use  Psychosocial / Contextual Factors:  Individual Factors has had problems with alcohol use his adult lifetime, Family Factors Reports a negative relationship with mom and does not speak with dad.  His current partner is concerend about his alcohol use and his mood swings that cause anger,  and Economic Factors currently works as a  and is economically comfortable   WHODAS: TBD    Referral / Collaboration:  The following referral(s) will be initiated: Group support to limit alcohol use.[email or web address] or contact [facility]     Anticipated number of session or this episode of care: 10-20      MeasurableTreatment Goal(s) related to diagnosis / functional impairment(s)  Goal 1: Patient will not give up easily    I will know I've met my goal when I am finishing what I started, and completed goals.      Objective #A (Patient Action)    Patient will Identify negative self-talk and behaviors: challenge core beliefs, myths, and actions.  Status: Continued - Date(s):10/27/2021     Intervention(s)  Therapist will assign homework to engage in cognitive restructuring  provide support to critically look at how negative beliefs keep you from moving forward .    Objective #B  Patient will identify core beliefs and the times in your life that originally created beliefs.  Status: Continued - Date(s): 10/27/2021     Intervention(s)  Therapist will assign homework to challenge core beliefs  provide educational  materials on core beliefs and lifetime patterns  teach how to challenge core beliefs.    Objective #C  Patient will will be working towards his goals .  Status: Continued - Date(s): 10/27/2021    Intervention(s)  Therapist will assign homework to continue to use skills from therapy  provide support to negotiate what works and what patient continues to struggle with.        Patient has reviewed and agreed to the above plan.      Catia Willis, LESLIE  October 27, 2021  Answers for HPI/ROS submitted by the patient on 8/4/2021  If you checked off any problems, how difficult have these problems made it for you to do your work, take care of things at home, or get along with other people?: Not difficult at all  PHQ9 TOTAL SCORE: 6  DAPHNE 7 TOTAL SCORE: 5

## 2021-11-10 ENCOUNTER — VIRTUAL VISIT (OUTPATIENT)
Dept: PSYCHOLOGY | Facility: CLINIC | Age: 51
End: 2021-11-10
Payer: COMMERCIAL

## 2021-11-10 DIAGNOSIS — F43.23 ADJUSTMENT DISORDER WITH MIXED ANXIETY AND DEPRESSED MOOD: Primary | ICD-10-CM

## 2021-11-10 PROCEDURE — 90834 PSYTX W PT 45 MINUTES: CPT | Mod: 95 | Performed by: MARRIAGE & FAMILY THERAPIST

## 2021-11-11 NOTE — PROGRESS NOTES
Progress Note    Patient Name: Nick Montiel  Date: 11/11/2021         Service Type: Individual      Session Start Time: 12:03PM  Session End Time: 12:48PM     Session Length: 45    Session #: 9    Attendees: Client attended alone    Service Modality:  Video Visit:    Telemedicine Visit: The patient's condition can be safely assessed and treated via synchronous audio and visual telemedicine encounter.      Reason for Telemedicine Visit: Services only offered telehealth    Originating Site (Patient Location): Patient's home    Distant Site (Provider Location): Provider Remote Setting    Consent:  The patient/guardian has verbally consented to: the potential risks and benefits of telemedicine (video visit) versus in person care; bill my insurance or make self-payment for services provided; and responsibility for payment of non-covered services.     Patient would like the video invitation sent by: Send to e-mail at: [email or web address]     Mode of Communication:  Video Conference via [employer]    As the provider I attest to compliance with applicable laws and regulations related to telemedicine.     Treatment Plan Last Reviewed: 10/27/2021    DATA  Interactive Complexity: No  Crisis: No       Progress Since Last Session (Related to Symptoms / Goals / Homework):   Symptoms: Improving client is moving into new place    Homework: Partially completed      Episode of Care Goals: Satisfactory progress - PREPARATION (Decided to change - considering how); Intervened by negotiating a change plan and determining options / strategies for behavior change, identifying triggers, exploring social supports, and working towards setting a date to begin behavior change     Current / Ongoing Stressors and Concerns:  Client talks about how dating has been going. He is mostly focused on one person but wants to keep his options open.     Treatment Objective(s) Addressed in This  "Session:   Discuss motivation ambivalence about making a behavior change   Encourage feelings identification     Intervention:   CBT and mindfulness       ASSESSMENT: Current Emotional / Mental Status (status of significant symptoms):   Risk status (Self / Other harm or suicidal ideation)   Patient denies current fears or concerns for personal safety.   Patient denies current or recent suicidal ideation or behaviors.   Patient denies current or recent homicidal ideation or behaviors.   Patient denies current or recent self injurious behavior or ideation.   Patient denies other safety concerns.   Patient reports there has been no change in risk factors since their last session.     Patient reports there has been no change in protective factors since their last session.     Recommended that patient call 911 or go to the local ED should there be a change in any of these risk factors.     Appearance:   Appropriate    Eye Contact:   Good    Psychomotor Behavior: Normal    Attitude:   Cooperative  Interested   Orientation:   All   Speech    Rate / Production: Talkative    Volume:  Normal    Mood:    Anxious  Irritable    Affect:    Appropriate    Thought Content:  Clear    Thought Form:  Coherent  Logical    Insight:    Fair      Medication Review:   No current psychiatric medications prescribed     Medication Compliance:   NA     Changes in Health Issues:   None reported     Chemical Use Review:   Substance Use: Chemical use reviewed, no active concerns identified      Tobacco Use: No current tobacco use.      Diagnosis:  1. Adjustment disorder with mixed anxiety and depressed mood        Collateral Reports Completed:   Not Applicable    PLAN: (Patient Tasks / Therapist Tasks / Other)  Homework: Focus on taking things slow with \"Olga\".      LESLIE Lincoln  November 11, 2021    ______________________________________________________________________    Treatment Plan    Patient's Name: Nick Interianoomari Montiel  Date " Of Birth: 1970    Date: 10/451624    DSM5 Diagnoses: Substance-Related & Addictive Disorders Alcohol Use Disorder   305.00 (F10.10) Mild continued use  Psychosocial / Contextual Factors:  Individual Factors has had problems with alcohol use his adult lifetime, Family Factors Reports a negative relationship with mom and does not speak with dad.  His current partner is concerend about his alcohol use and his mood swings that cause anger,  and Economic Factors currently works as a  and is economically comfortable   WHODAS: TBD    Referral / Collaboration:  The following referral(s) will be initiated: Group support to limit alcohol use.[email or web address] or [facility]     Anticipated number of session or this episode of care: 10-20      MeasurableTreatment Goal(s) related to diagnosis / functional impairment(s)  Goal 1: Patient will not give up easily    I will know I've met my goal when I am finishing what I started, and completed goals.      Objective #A (Patient Action)    Patient will Identify negative self-talk and behaviors: challenge core beliefs, myths, and actions.  Status: Continued - Date(s):10/27/2021     Intervention(s)  Therapist will assign homework to engage in cognitive restructuring  provide support to critically look at how negative beliefs keep you from moving forward .    Objective #B  Patient will identify core beliefs and the times in your life that originally created beliefs.  Status: Continued - Date(s): 10/27/2021     Intervention(s)  Therapist will assign homework to challenge core beliefs  provide educational materials on core beliefs and lifetime patterns  teach how to challenge core beliefs.    Objective #C  Patient will will be working towards his goals .  Status: Continued - Date(s): 10/27/2021    Intervention(s)  Therapist will assign homework to continue to use skills from therapy  provide support to negotiate what works and what patient continues to  struggle with.        Patient has reviewed and agreed to the above plan.      Catia Willis, LESLIE  October 27, 2021  Answers for HPI/ROS submitted by the patient on 8/4/2021  If you checked off any problems, how difficult have these problems made it for you to do your work, take care of things at home, or get along with other people?: Not difficult at all  PHQ9 TOTAL SCORE: 6  DAPHNE 7 TOTAL SCORE: 5

## 2021-11-17 ENCOUNTER — VIRTUAL VISIT (OUTPATIENT)
Dept: PSYCHOLOGY | Facility: CLINIC | Age: 51
End: 2021-11-17
Payer: COMMERCIAL

## 2021-11-17 DIAGNOSIS — F43.23 ADJUSTMENT DISORDER WITH MIXED ANXIETY AND DEPRESSED MOOD: Primary | ICD-10-CM

## 2021-11-17 PROCEDURE — 90834 PSYTX W PT 45 MINUTES: CPT | Mod: 95 | Performed by: MARRIAGE & FAMILY THERAPIST

## 2021-11-17 NOTE — PROGRESS NOTES
Progress Note    Patient Name: Nick Montiel  Date: 11/17/2021         Service Type: Individual      Session Start Time: 12:03PM  Session End Time: 12:48PM     Session Length: 45    Session #: 10    Attendees: Client attended alone    Service Modality:  Video Visit:    Telemedicine Visit: The patient's condition can be safely assessed and treated via synchronous audio and visual telemedicine encounter.      Reason for Telemedicine Visit: Services only offered telehealth    Originating Site (Patient Location): Patient's home    Distant Site (Provider Location): Provider Remote Setting    Consent:  The patient/guardian has verbally consented to: the potential risks and benefits of telemedicine (video visit) versus in person care; bill my insurance or make self-payment for services provided; and responsibility for payment of non-covered services.     Patient would like the video invitation sent by: Send to e-mail at: [email or web address]}     Mode of Communication:  Video Conference via [email or web address]    As the provider I attest to compliance with applicable laws and regulations related to telemedicine.     Treatment Plan Last Reviewed: 10/27/2021    DATA  Interactive Complexity: No  Crisis: No       Progress Since Last Session (Related to Symptoms / Goals / Homework):   Symptoms: Improving client is moving into new place    Homework: Partially completed      Episode of Care Goals: Satisfactory progress - PREPARATION (Decided to change - considering how); Intervened by negotiating a change plan and determining options / strategies for behavior change, identifying triggers, exploring social supports, and working towards setting a date to begin behavior change     Current / Ongoing Stressors and Concerns:  Client talks about how dating has been going. He is realizing that if he is in a relationship, he wants it be open.     Treatment Objective(s) Addressed in This  Session:   Discuss motivation ambivalence about making a behavior change   Encourage feelings identification     Intervention:   CBT and mindfulness       ASSESSMENT: Current Emotional / Mental Status (status of significant symptoms):   Risk status (Self / Other harm or suicidal ideation)   Patient denies current fears or concerns for personal safety.   Patient denies current or recent suicidal ideation or behaviors.   Patient denies current or recent homicidal ideation or behaviors.   Patient denies current or recent self injurious behavior or ideation.   Patient denies other safety concerns.   Patient reports there has been no change in risk factors since their last session.     Patient reports there has been no change in protective factors since their last session.     Recommended that patient call 911 or go to the local ED should there be a change in any of these risk factors.     Appearance:   Appropriate    Eye Contact:   Good    Psychomotor Behavior: Normal    Attitude:   Cooperative  Interested   Orientation:   All   Speech    Rate / Production: Talkative    Volume:  Normal    Mood:    Normal   Affect:    Appropriate    Thought Content:  Clear    Thought Form:  Coherent  Logical    Insight:    Fair      Medication Review:   No current psychiatric medications prescribed     Medication Compliance:   NA     Changes in Health Issues:   None reported     Chemical Use Review:   Substance Use: Chemical use reviewed, no active concerns identified      Tobacco Use: No current tobacco use.      Diagnosis:  1. Adjustment disorder with mixed anxiety and depressed mood        Collateral Reports Completed:   Not Applicable    PLAN: (Patient Tasks / Therapist Tasks / Other)  Homework: Focus on clarifying the relationship with Olga.      LESLIE Lincoln  November 17, 2021    ______________________________________________________________________    Treatment Plan    Patient's Name: Nick Montiel  Date Of  Birth: 1970    Date: 10/505836    DSM5 Diagnoses: Substance-Related & Addictive Disorders Alcohol Use Disorder   305.00 (F10.10) Mild continued use  Psychosocial / Contextual Factors:  Individual Factors has had problems with alcohol use his adult lifetime, Family Factors Reports a negative relationship with mom and does not speak with dad.  His current partner is concerend about his alcohol use and his mood swings that cause anger,  and Economic Factors currently works as a  and is economically comfortable   WHODAS: TBD    Referral / Collaboration:  The following referral(s) will be initiated: Group support to limit alcohol use.[email or web address] or contact [facility]     Anticipated number of session or this episode of care: 10-20      MeasurableTreatment Goal(s) related to diagnosis / functional impairment(s)  Goal 1: Patient will not give up easily    I will know I've met my goal when I am finishing what I started, and completed goals.      Objective #A (Patient Action)    Patient will Identify negative self-talk and behaviors: challenge core beliefs, myths, and actions.  Status: Continued - Date(s):10/27/2021     Intervention(s)  Therapist will assign homework to engage in cognitive restructuring  provide support to critically look at how negative beliefs keep you from moving forward .    Objective #B  Patient will identify core beliefs and the times in your life that originally created beliefs.  Status: Continued - Date(s): 10/27/2021     Intervention(s)  Therapist will assign homework to challenge core beliefs  provide educational materials on core beliefs and lifetime patterns  teach how to challenge core beliefs.    Objective #C  Patient will will be working towards his goals .  Status: Continued - Date(s): 10/27/2021    Intervention(s)  Therapist will assign homework to continue to use skills from therapy  provide support to negotiate what works and what patient continues to  struggle with.        Patient has reviewed and agreed to the above plan.      Catia Willis, LESLIE  October 27, 2021  Answers for HPI/ROS submitted by the patient on 8/4/2021  If you checked off any problems, how difficult have these problems made it for you to do your work, take care of things at home, or get along with other people?: Not difficult at all  PHQ9 TOTAL SCORE: 6  DAPHNE 7 TOTAL SCORE: 5

## 2021-12-08 ENCOUNTER — VIRTUAL VISIT (OUTPATIENT)
Dept: PSYCHOLOGY | Facility: CLINIC | Age: 51
End: 2021-12-08
Payer: COMMERCIAL

## 2021-12-08 DIAGNOSIS — F43.23 ADJUSTMENT DISORDER WITH MIXED ANXIETY AND DEPRESSED MOOD: Primary | ICD-10-CM

## 2021-12-08 PROCEDURE — 90834 PSYTX W PT 45 MINUTES: CPT | Mod: 95 | Performed by: MARRIAGE & FAMILY THERAPIST

## 2021-12-08 NOTE — PROGRESS NOTES
Progress Note    Patient Name: Nick Montiel  Date: 12/8/2021         Service Type: Individual      Session Start Time: 12:03PM  Session End Time: 12:48PM     Session Length: 45    Session #: 11    Attendees: Client attended alone    Service Modality:  Video Visit:    Telemedicine Visit: The patient's condition can be safely assessed and treated via synchronous audio and visual telemedicine encounter.      Reason for Telemedicine Visit: Services only offered telehealth    Originating Site (Patient Location): Patient's home    Distant Site (Provider Location): Provider Remote Setting    Consent:  The patient/guardian has verbally consented to: the potential risks and benefits of telemedicine (video visit) versus in person care; bill my insurance or make self-payment for services provided; and responsibility for payment of non-covered services.     Patient would like the video invitation sent by: Send to e-mail at: [email or web address]}     Mode of Communication:  Video Conference via [employer]    As the provider I attest to compliance with applicable laws and regulations related to telemedicine.     Treatment Plan Last Reviewed: 10/27/2021    DATA  Interactive Complexity: No  Crisis: No       Progress Since Last Session (Related to Symptoms / Goals / Homework):   Symptoms: Improving client is moving into new place    Homework: Partially completed      Episode of Care Goals: Satisfactory progress - PREPARATION (Decided to change - considering how); Intervened by negotiating a change plan and determining options / strategies for behavior change, identifying triggers, exploring social supports, and working towards setting a date to begin behavior change     Current / Ongoing Stressors and Concerns:  Client shares his feelings that have resolved from feeling lonely during Thanksgiving. He is better now and says he should have planned more proactively to not be  alone. Client is planning to press Olga about their relationship status. Also talked today about needing to address his parents estate situation and share some frustrations he is having with his brother's lack of involvement.     Treatment Objective(s) Addressed in This Session:   Discuss motivation ambivalence about making a behavior change   Encourage feelings identification     Intervention:   CBT and mindfulness       ASSESSMENT: Current Emotional / Mental Status (status of significant symptoms):   Risk status (Self / Other harm or suicidal ideation)   Patient denies current fears or concerns for personal safety.   Patient denies current or recent suicidal ideation or behaviors.   Patient denies current or recent homicidal ideation or behaviors.   Patient denies current or recent self injurious behavior or ideation.   Patient denies other safety concerns.   Patient reports there has been no change in risk factors since their last session.     Patient reports there has been no change in protective factors since their last session.     Recommended that patient call 911 or go to the local ED should there be a change in any of these risk factors.     Appearance:   Appropriate    Eye Contact:   Good    Psychomotor Behavior: Normal    Attitude:   Cooperative  Interested   Orientation:   All   Speech    Rate / Production: Talkative    Volume:  Normal    Mood:    Normal   Affect:    Appropriate    Thought Content:  Clear    Thought Form:  Coherent  Logical    Insight:    Fair      Medication Review:   No current psychiatric medications prescribed     Medication Compliance:   NA     Changes in Health Issues:   None reported     Chemical Use Review:   Substance Use: Chemical use reviewed, no active concerns identified      Tobacco Use: No current tobacco use.      Diagnosis:  1. Adjustment disorder with mixed anxiety and depressed mood        Collateral Reports Completed:   Not Applicable    PLAN: (Patient Tasks /  Therapist Tasks / Other)  Homework: Focus on clarifying the relationship with Olga.      Catia Ndiaye Lazaro, LMFT      ______________________________________________________________________    Treatment Plan    Patient's Name: Nick Montiel  YOB: 1970    Date: 10/950504    DSM5 Diagnoses: Substance-Related & Addictive Disorders Alcohol Use Disorder   305.00 (F10.10) Mild continued use  Psychosocial / Contextual Factors:  Individual Factors has had problems with alcohol use his adult lifetime, Family Factors Reports a negative relationship with mom and does not speak with dad.  His current partner is concerend about his alcohol use and his mood swings that cause anger,  and Economic Factors currently works as a  and is economically comfortable   WHODAS: TBD    Referral / Collaboration:  The following referral(s) will be initiated: Group support to limit alcohol use.[email or web address] or contact [facility]     Anticipated number of session or this episode of care: 10-20      MeasurableTreatment Goal(s) related to diagnosis / functional impairment(s)  Goal 1: Patient will not give up easily    I will know I've met my goal when I am finishing what I started, and completed goals.      Objective #A (Patient Action)    Patient will Identify negative self-talk and behaviors: challenge core beliefs, myths, and actions.  Status: Continued - Date(s):10/27/2021     Intervention(s)  Therapist will assign homework to engage in cognitive restructuring  provide support to critically look at how negative beliefs keep you from moving forward .    Objective #B  Patient will identify core beliefs and the times in your life that originally created beliefs.  Status: Continued - Date(s): 10/27/2021     Intervention(s)  Therapist will assign homework to challenge core beliefs  provide educational materials on core beliefs and lifetime patterns  teach how to challenge core beliefs.    Objective  #C  Patient will will be working towards his goals .  Status: Continued - Date(s): 10/27/2021    Intervention(s)  Therapist will assign homework to continue to use skills from therapy  provide support to negotiate what works and what patient continues to struggle with.        Patient has reviewed and agreed to the above plan.      Catia Willis, LESLIE  October 27, 2021  Answers for HPI/ROS submitted by the patient on 8/4/2021  If you checked off any problems, how difficult have these problems made it for you to do your work, take care of things at home, or get along with other people?: Not difficult at all  PHQ9 TOTAL SCORE: 6  DAPHNE 7 TOTAL SCORE: 5

## 2021-12-14 ENCOUNTER — VIRTUAL VISIT (OUTPATIENT)
Dept: PSYCHOLOGY | Facility: CLINIC | Age: 51
End: 2021-12-14
Payer: COMMERCIAL

## 2021-12-14 DIAGNOSIS — F43.23 ADJUSTMENT DISORDER WITH MIXED ANXIETY AND DEPRESSED MOOD: Primary | ICD-10-CM

## 2021-12-14 PROCEDURE — 90834 PSYTX W PT 45 MINUTES: CPT | Mod: 95 | Performed by: MARRIAGE & FAMILY THERAPIST

## 2021-12-14 NOTE — PROGRESS NOTES
Progress Note    Patient Name: Nick Montiel  Date: 12/14/2021         Service Type: Individual      Session Start Time: 12:03PM  Session End Time: 12:48PM     Session Length: 45    Session #: 12    Attendees: Client attended alone    Service Modality:  Video Visit:    Telemedicine Visit: The patient's condition can be safely assessed and treated via synchronous audio and visual telemedicine encounter.      Reason for Telemedicine Visit: Services only offered telehealth    Originating Site (Patient Location): Patient's home    Distant Site (Provider Location): Provider Remote Setting    Consent:  The patient/guardian has verbally consented to: the potential risks and benefits of telemedicine (video visit) versus in person care; bill my insurance or make self-payment for services provided; and responsibility for payment of non-covered services.     Patient would like the video invitation sent by: Send to e-mail at: [email or web address]}     Mode of Communication:  Video Conference via [employer]    As the provider I attest to compliance with applicable laws and regulations related to telemedicine.     Treatment Plan Last Reviewed: 10/27/2021    DATA  Interactive Complexity: No  Crisis: No       Progress Since Last Session (Related to Symptoms / Goals / Homework):   Symptoms: Improving client is moving into new place    Homework: Partially completed      Episode of Care Goals: Satisfactory progress - PREPARATION (Decided to change - considering how); Intervened by negotiating a change plan and determining options / strategies for behavior change, identifying triggers, exploring social supports, and working towards setting a date to begin behavior change     Current / Ongoing Stressors and Concerns:  Client talks about his trip home to [place]. He met up with friends and family and it felt good to be home. He hooked up with a few young men. He heard from Olga  that he doesn't want a relationship. Planning to talk more with him when they see each other.     Treatment Objective(s) Addressed in This Session:   Discuss motivation ambivalence about making a behavior change   Encourage feelings identification     Intervention:   CBT and mindfulness       ASSESSMENT: Current Emotional / Mental Status (status of significant symptoms):   Risk status (Self / Other harm or suicidal ideation)   Patient denies current fears or concerns for personal safety.   Patient denies current or recent suicidal ideation or behaviors.   Patient denies current or recent homicidal ideation or behaviors.   Patient denies current or recent self injurious behavior or ideation.   Patient denies other safety concerns.   Patient reports there has been no change in risk factors since their last session.     Patient reports there has been no change in protective factors since their last session.     Recommended that patient call 911 or go to the local ED should there be a change in any of these risk factors.     Appearance:   Appropriate    Eye Contact:   Good    Psychomotor Behavior: Normal    Attitude:   Cooperative  Interested   Orientation:   All   Speech    Rate / Production: Talkative    Volume:  Normal    Mood:    Normal   Affect:    Appropriate    Thought Content:  Clear    Thought Form:  Coherent  Logical    Insight:    Fair      Medication Review:   No current psychiatric medications prescribed     Medication Compliance:   NA     Changes in Health Issues:   None reported     Chemical Use Review:   Substance Use: Chemical use reviewed, no active concerns identified      Tobacco Use: No current tobacco use.      Diagnosis:  1. Adjustment disorder with mixed anxiety and depressed mood        Collateral Reports Completed:   Not Applicable    PLAN: (Patient Tasks / Therapist Tasks / Other)  Homework: Have his conversation with Olga.      Catia Willis,  LMFT      ______________________________________________________________________    Treatment Plan    Patient's Name: Nick Montiel  YOB: 1970    Date: 10/318391    DSM5 Diagnoses: Substance-Related & Addictive Disorders Alcohol Use Disorder   305.00 (F10.10) Mild continued use  Psychosocial / Contextual Factors:  Individual Factors has had problems with alcohol use his adult lifetime, Family Factors Reports a negative relationship with mom and does not speak with dad.  His current partner is concerend about his alcohol use and his mood swings that cause anger,  and Economic Factors currently works as a  and is economically comfortable   WHODAS: TBD    Referral / Collaboration:  The following referral(s) will be initiated: Group support to limit alcohol use.[email or web address] or contact Daphne Chen     Anticipated number of session or this episode of care: 10-20      MeasurableTreatment Goal(s) related to diagnosis / functional impairment(s)  Goal 1: Patient will not give up easily    I will know I've met my goal when I am finishing what I started, and completed goals.      Objective #A (Patient Action)    Patient will Identify negative self-talk and behaviors: challenge core beliefs, myths, and actions.  Status: Continued - Date(s):10/27/2021     Intervention(s)  Therapist will assign homework to engage in cognitive restructuring  provide support to critically look at how negative beliefs keep you from moving forward .    Objective #B  Patient will identify core beliefs and the times in your life that originally created beliefs.  Status: Continued - Date(s): 10/27/2021     Intervention(s)  Therapist will assign homework to challenge core beliefs  provide educational materials on core beliefs and lifetime patterns  teach how to challenge core beliefs.    Objective #C  Patient will will be working towards his goals .  Status: Continued - Date(s):  10/27/2021    Intervention(s)  Therapist will assign homework to continue to use skills from therapy  provide support to negotiate what works and what patient continues to struggle with.        Patient has reviewed and agreed to the above plan.      Catia Willis, LESLIE  October 27, 2021  Answers for HPI/ROS submitted by the patient on 8/4/2021  If you checked off any problems, how difficult have these problems made it for you to do your work, take care of things at home, or get along with other people?: Not difficult at all  PHQ9 TOTAL SCORE: 6  DAPHNE 7 TOTAL SCORE: 5

## 2021-12-15 ENCOUNTER — OFFICE VISIT (OUTPATIENT)
Dept: FAMILY MEDICINE | Facility: CLINIC | Age: 51
End: 2021-12-15
Payer: COMMERCIAL

## 2021-12-15 VITALS
WEIGHT: 180 LBS | HEIGHT: 67 IN | BODY MASS INDEX: 28.25 KG/M2 | SYSTOLIC BLOOD PRESSURE: 124 MMHG | DIASTOLIC BLOOD PRESSURE: 74 MMHG | RESPIRATION RATE: 14 BRPM | OXYGEN SATURATION: 97 % | HEART RATE: 53 BPM

## 2021-12-15 DIAGNOSIS — Z11.59 NEED FOR HEPATITIS B SCREENING TEST: ICD-10-CM

## 2021-12-15 DIAGNOSIS — Z12.5 SCREENING FOR PROSTATE CANCER: ICD-10-CM

## 2021-12-15 DIAGNOSIS — Z11.3 ROUTINE SCREENING FOR STI (SEXUALLY TRANSMITTED INFECTION): ICD-10-CM

## 2021-12-15 DIAGNOSIS — J45.20 MILD INTERMITTENT ASTHMA WITHOUT COMPLICATION: ICD-10-CM

## 2021-12-15 DIAGNOSIS — Z12.11 SCREEN FOR COLON CANCER: ICD-10-CM

## 2021-12-15 DIAGNOSIS — Z13.1 SCREENING FOR DIABETES MELLITUS: ICD-10-CM

## 2021-12-15 DIAGNOSIS — Z11.4 SCREENING FOR HIV (HUMAN IMMUNODEFICIENCY VIRUS): ICD-10-CM

## 2021-12-15 DIAGNOSIS — F10.10 ALCOHOL USE DISORDER, MILD, ABUSE: ICD-10-CM

## 2021-12-15 DIAGNOSIS — Z00.00 ROUTINE GENERAL MEDICAL EXAMINATION AT A HEALTH CARE FACILITY: Primary | ICD-10-CM

## 2021-12-15 DIAGNOSIS — Z11.59 NEED FOR HEPATITIS C SCREENING TEST: ICD-10-CM

## 2021-12-15 DIAGNOSIS — R73.01 ELEVATED FASTING BLOOD SUGAR: ICD-10-CM

## 2021-12-15 DIAGNOSIS — Z23 HIGH PRIORITY FOR 2019-NCOV VACCINE: ICD-10-CM

## 2021-12-15 DIAGNOSIS — E78.00 HYPERCHOLESTEROLEMIA: ICD-10-CM

## 2021-12-15 LAB
ALBUMIN SERPL-MCNC: 3.7 G/DL (ref 3.4–5)
ALP SERPL-CCNC: 77 U/L (ref 40–150)
ALT SERPL W P-5'-P-CCNC: 67 U/L (ref 0–70)
ANION GAP SERPL CALCULATED.3IONS-SCNC: 6 MMOL/L (ref 3–14)
AST SERPL W P-5'-P-CCNC: 35 U/L (ref 0–45)
BILIRUB SERPL-MCNC: 0.7 MG/DL (ref 0.2–1.3)
BUN SERPL-MCNC: 13 MG/DL (ref 7–30)
CALCIUM SERPL-MCNC: 8.9 MG/DL (ref 8.5–10.1)
CHLORIDE BLD-SCNC: 105 MMOL/L (ref 94–109)
CO2 SERPL-SCNC: 26 MMOL/L (ref 20–32)
CREAT SERPL-MCNC: 0.82 MG/DL (ref 0.66–1.25)
GFR SERPL CREATININE-BSD FRML MDRD: >90 ML/MIN/1.73M2
GLUCOSE BLD-MCNC: 96 MG/DL (ref 70–99)
HBA1C MFR BLD: 5.6 % (ref 0–5.6)
HBV SURFACE AG SERPL QL IA: NONREACTIVE
HCV AB SERPL QL IA: NONREACTIVE
HIV 1+2 AB+HIV1 P24 AG SERPL QL IA: NONREACTIVE
POTASSIUM BLD-SCNC: 3.9 MMOL/L (ref 3.4–5.3)
PROT SERPL-MCNC: 7.4 G/DL (ref 6.8–8.8)
PSA SERPL-MCNC: 2.31 UG/L (ref 0–4)
SODIUM SERPL-SCNC: 137 MMOL/L (ref 133–144)

## 2021-12-15 PROCEDURE — 86803 HEPATITIS C AB TEST: CPT | Performed by: INTERNAL MEDICINE

## 2021-12-15 PROCEDURE — 83036 HEMOGLOBIN GLYCOSYLATED A1C: CPT | Performed by: INTERNAL MEDICINE

## 2021-12-15 PROCEDURE — 80061 LIPID PANEL: CPT | Performed by: INTERNAL MEDICINE

## 2021-12-15 PROCEDURE — 87491 CHLMYD TRACH DNA AMP PROBE: CPT | Performed by: INTERNAL MEDICINE

## 2021-12-15 PROCEDURE — 87591 N.GONORRHOEAE DNA AMP PROB: CPT | Performed by: INTERNAL MEDICINE

## 2021-12-15 PROCEDURE — 87389 HIV-1 AG W/HIV-1&-2 AB AG IA: CPT | Performed by: INTERNAL MEDICINE

## 2021-12-15 PROCEDURE — 91300 COVID-19,PF,PFIZER (12+ YRS): CPT | Performed by: INTERNAL MEDICINE

## 2021-12-15 PROCEDURE — 99214 OFFICE O/P EST MOD 30 MIN: CPT | Mod: 25 | Performed by: INTERNAL MEDICINE

## 2021-12-15 PROCEDURE — 80053 COMPREHEN METABOLIC PANEL: CPT | Performed by: INTERNAL MEDICINE

## 2021-12-15 PROCEDURE — 36415 COLL VENOUS BLD VENIPUNCTURE: CPT | Performed by: INTERNAL MEDICINE

## 2021-12-15 PROCEDURE — G0103 PSA SCREENING: HCPCS | Performed by: INTERNAL MEDICINE

## 2021-12-15 PROCEDURE — 99396 PREV VISIT EST AGE 40-64: CPT | Performed by: INTERNAL MEDICINE

## 2021-12-15 PROCEDURE — 0004A COVID-19,PF,PFIZER (12+ YRS): CPT | Performed by: INTERNAL MEDICINE

## 2021-12-15 PROCEDURE — 87340 HEPATITIS B SURFACE AG IA: CPT | Performed by: INTERNAL MEDICINE

## 2021-12-15 RX ORDER — MONTELUKAST SODIUM 10 MG/1
10 TABLET ORAL AT BEDTIME
Qty: 90 TABLET | Refills: 1 | Status: SHIPPED | OUTPATIENT
Start: 2021-12-15

## 2021-12-15 ASSESSMENT — ENCOUNTER SYMPTOMS
SHORTNESS OF BREATH: 0
PARESTHESIAS: 0
FEVER: 0
DIARRHEA: 0
CHILLS: 0
COUGH: 0
FREQUENCY: 0
HEADACHES: 0
NERVOUS/ANXIOUS: 1
DYSURIA: 0
HEMATURIA: 0
HEARTBURN: 0
PALPITATIONS: 0
ABDOMINAL PAIN: 0
SORE THROAT: 0
EYE PAIN: 0
WEAKNESS: 0
CONSTIPATION: 0
ARTHRALGIAS: 0
NAUSEA: 0
DIZZINESS: 0
MYALGIAS: 0
HEMATOCHEZIA: 0
JOINT SWELLING: 0

## 2021-12-15 ASSESSMENT — MIFFLIN-ST. JEOR: SCORE: 1630.1

## 2021-12-15 ASSESSMENT — PAIN SCALES - GENERAL: PAINLEVEL: NO PAIN (0)

## 2021-12-15 NOTE — PROGRESS NOTES
SUBJECTIVE:   CC: Nick Montiel is an 51 year old male who presents for preventative health visit.     Nick unfortunately is going through a divorce.  He is now living in an apartment in [place],  is in [place].   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      HLD - due for lipid check today. Taking crestor 5mg daily.     Still issues with the left frozen shoulder, limited ROM .     Would like to initiate PREP now that he is getting .  Has a friend that is taking it, they see a specialist.      Alcohol use - usually 2-3 drinks when he gets home from work.  Not too concerned about it. But would like to keep an eye on his liver tests.     Follows with a therapist.       Patient has been advised of split billing requirements and indicates understanding: Yes  Healthy Habits:     Getting at least 3 servings of Calcium per day:  NO    Bi-annual eye exam:  Yes    Dental care twice a year:  Yes    Sleep apnea or symptoms of sleep apnea:  None    Diet:  Regular (no restrictions)    Frequency of exercise:  None    Taking medications regularly:  Yes    Medication side effects:  Not applicable    PHQ-2 Total Score: 1    Additional concerns today:  Yes          Today's PHQ-2 Score:   PHQ-2 ( 1999 Pfizer) 12/15/2021   Q1: Little interest or pleasure in doing things 0   Q2: Feeling down, depressed or hopeless 1   PHQ-2 Score 1   PHQ-2 Total Score (12-17 Years)- Positive if 3 or more points; Administer PHQ-A if positive -   Q1: Little interest or pleasure in doing things Not at all   Q2: Feeling down, depressed or hopeless Several days   PHQ-2 Score 1       Abuse: Current or Past(Physical, Sexual or Emotional)- No  Do you feel safe in your environment? Yes    Have you ever done Advance Care Planning? (For example, a Health Directive, POLST, or a discussion with a medical provider or your loved ones about your wishes): No, advance care planning information given to patient to  review.  Patient declined advance care planning discussion at this time.    Social History     Tobacco Use     Smoking status: Former Smoker     Packs/day: 0.00     Years: 0.00     Pack years: 0.00     Types: Cigarettes     Smokeless tobacco: Never Used   Substance Use Topics     Alcohol use: Yes     Comment: 2-3 drinks most nights      If you drink alcohol do you typically have >3 drinks per day or >7 drinks per week? YES    Alcohol Use 12/15/2021   Prescreen: >3 drinks/day or >7 drinks/week? Yes   Prescreen: >3 drinks/day or >7 drinks/week? -   AUDIT SCORE  10     AUDIT - Alcohol Use Disorders Identification Test - Reproduced from the World Health Organization Audit 2001 (Second Edition) 12/15/2021   1.  How often do you have a drink containing alcohol? 4 or more times a week   2.  How many drinks containing alcohol do you have on a typical day when you are drinking? 3 or 4   3.  How often do you have five or more drinks on one occasion? Less than monthly   4.  How often during the last year have you found that you were not able to stop drinking once you had started? Never   5.  How often during the last year have you failed to do what was normally expected of you because of drinking? Never   6.  How often during the last year have you needed a first drink in the morning to get yourself going after a heavy drinking session? Never   7.  How often during the last year have you had a feeling of guilt or remorse after drinking? Never   8.  How often during the last year have you been unable to remember what happened the night before because of your drinking? Never   9.  Have you or someone else been injured because of your drinking? No   10. Has a relative, friend, doctor or other health care worker been concerned about your drinking or suggested you cut down? Yes, during the last year   TOTAL SCORE 10       Last PSA:   PSA   Date Value Ref Range Status   03/10/2020 1.01 0 - 4 ug/L Final     Comment:     Assay Method:   Chemiluminescence using Siemens Vista analyzer       Reviewed orders with patient. Reviewed health maintenance and updated orders accordingly - Yes  Patient Active Problem List   Diagnosis     Alcohol use disorder, mild, abuse     Hypercholesterolemia     Past Surgical History:   Procedure Laterality Date     ORTHOPEDIC SURGERY Right     Right elbow break, early 90's       Social History     Tobacco Use     Smoking status: Former Smoker     Packs/day: 0.00     Years: 0.00     Pack years: 0.00     Types: Cigarettes     Smokeless tobacco: Never Used   Substance Use Topics     Alcohol use: Yes     Comment: 2-3 drinks most nights      Family History   Problem Relation Age of Onset     Diabetes Maternal Grandmother      Substance Abuse Father      Substance Abuse Brother          Current Outpatient Medications   Medication Sig Dispense Refill     albuterol (PROAIR HFA/PROVENTIL HFA/VENTOLIN HFA) 108 (90 Base) MCG/ACT inhaler Inhale 2 puffs into the lungs every 6 hours       ibuprofen (ADVIL/MOTRIN) 200 MG tablet Take 200 mg by mouth every 4 hours as needed for mild pain       ketoconazole (NIZORAL) 2 % external cream Apply topically 2 times daily 30 g 1     montelukast (SINGULAIR) 10 MG tablet Take 1 tablet (10 mg) by mouth At Bedtime 90 tablet 1     naproxen sodium (ANAPROX) 220 MG tablet Take 220 mg by mouth 2 times daily (with meals)       rosuvastatin (CRESTOR) 5 MG tablet TAKE ONE TABLET BY MOUTH ONE TIME DAILY 90 tablet 3       Reviewed and updated as needed this visit by clinical staff  Tobacco  Allergies  Meds   Med Hx  Surg Hx  Fam Hx  Soc Hx       Reviewed and updated as needed this visit by Provider  Tobacco   Meds   Med Hx  Surg Hx  Fam Hx  Soc Hx          Review of Systems   Constitutional: Negative for chills and fever.   HENT: Negative for congestion, ear pain, hearing loss and sore throat.    Eyes: Negative for pain and visual disturbance.   Respiratory: Negative for cough and shortness of  "breath.    Cardiovascular: Negative for chest pain, palpitations and peripheral edema.   Gastrointestinal: Negative for abdominal pain, constipation, diarrhea, heartburn, hematochezia and nausea.   Genitourinary: Negative for dysuria, frequency, genital sores, hematuria, impotence, penile discharge and urgency.   Musculoskeletal: Negative for arthralgias, joint swelling and myalgias.   Skin: Negative for rash.   Neurological: Negative for dizziness, weakness, headaches and paresthesias.   Psychiatric/Behavioral: Negative for mood changes. The patient is nervous/anxious.          OBJECTIVE:   /74   Pulse 53   Resp 14   Ht 1.702 m (5' 7\")   Wt 81.6 kg (180 lb)   SpO2 97%   BMI 28.19 kg/m      Physical Exam  GENERAL: healthy, alert and no distress  EYES: Eyes grossly normal to inspection, PERRL and conjunctivae and sclerae normal  HENT: ear canals and TM's normal, mouth without ulcers or lesions  NECK: no adenopathy, no asymmetry, masses, or scars and thyroid normal to palpation  RESP: lungs clear to auscultation - no rales, rhonchi or wheezes  CV: regular rate and rhythm, normal S1 S2, no S3 or S4, no murmur, click or rub, no peripheral edema and peripheral pulses strong  ABDOMEN: soft, nontender, no hepatosplenomegaly, no masses and bowel sounds normal  MS: no gross musculoskeletal defects noted, no edema  NEURO: Normal strength and tone, mentation intact and speech normal  PSYCH: mentation appears normal, affect normal/bright        ASSESSMENT/PLAN:   (Z00.00) Routine general medical examination at a health care facility  (primary encounter diagnosis)  Comment:   Due for colon cancer screening  Check PSA  Will get COVID booster, but declines other vaccines       (Z91.89) At risk for HIV due to homosexual contact  Comment: will check baseline blood work.  If wnl and screening negative okay to start Truvada.  Will need bloodwork every 3 months.  He may also want to see the specialist his friend is seeing, " will let me know the name if he needs a referral       (F10.10) Alcohol use disorder, mild, abuse  Comment:   Plan: Comprehensive metabolic panel (BMP + Alb, Alk         Phos, ALT, AST, Total. Bili, TP)            (E78.00) Hypercholesterolemia  Comment: continue rosuvastatin  Plan: Lipid panel reflex to direct LDL Fasting          Asthma - well controlled with singulair.     (Z11.4) Screening for HIV (human immunodeficiency virus)  Comment:   Plan: HIV Antigen Antibody Combo            (Z11.59) Need for hepatitis C screening test  Comment:   Plan: Hepatitis C Screen Reflex to HCV RNA Quant and         Genotype            (Z12.11) Screen for colon cancer  Comment:   Plan: Adult Gastro Ref - Procedure Only, CANCELED:         Fecal colorectal cancer screen (FIT)            (Z13.1) Screening for diabetes mellitus  Comment:   Plan:     (R73.01) Elevated fasting blood sugar  Comment:   Plan: Hemoglobin A1c            (Z11.3) Routine screening for STI (sexually transmitted infection)  Comment:   Plan: NEISSERIA GONORRHOEA PCR, CHLAMYDIA TRACHOMATIS        PCR            (Z12.5) Screening for prostate cancer  Comment:   Plan: PSA, screen            (Z11.59) Need for hepatitis B screening test  Comment:   Plan: Hepatitis B surface antigen            (Z23) High priority for 2019-nCoV vaccine  Comment:   Plan: COVID-19,PF,PFIZER (12+ Yrs PURPLE LABEL)              Patient has been advised of split billing requirements and indicates understanding: Yes  COUNSELING:   Reviewed preventive health counseling, as reflected in patient instructions       Regular exercise       Healthy diet/nutrition       Safe sex practices/STD prevention       Consider Hep C screening for all patients one time for ages 18-79 years       HIV screeninx in teen years, 1x in adult years, and at intervals if high risk       Colon cancer screening       Prostate cancer screening    Estimated body mass index is 28.19 kg/m  as calculated from the  "following:    Height as of this encounter: 1.702 m (5' 7\").    Weight as of this encounter: 81.6 kg (180 lb).     Weight management plan: Discussed healthy diet and exercise guidelines    He reports that he has quit smoking. His smoking use included cigarettes. He smoked 0.00 packs per day for 0.00 years. He has never used smokeless tobacco.      Counseling Resources:  ATP IV Guidelines  Pooled Cohorts Equation Calculator  FRAX Risk Assessment  ICSI Preventive Guidelines  Dietary Guidelines for Americans, 2010  USDA's MyPlate  ASA Prophylaxis  Lung CA Screening    Babs Dumas MD  [facility]  "

## 2021-12-16 LAB
C TRACH DNA SPEC QL NAA+PROBE: NEGATIVE
N GONORRHOEA DNA SPEC QL NAA+PROBE: NEGATIVE

## 2021-12-16 RX ORDER — EMTRICITABINE AND TENOFOVIR DISOPROXIL FUMARATE 200; 300 MG/1; MG/1
1 TABLET, FILM COATED ORAL DAILY
Qty: 90 TABLET | Refills: 1 | Status: SHIPPED | OUTPATIENT
Start: 2021-12-16 | End: 2021-12-21 | Stop reason: ALTCHOICE

## 2021-12-17 LAB
CHOLEST SERPL-MCNC: 150 MG/DL
FASTING STATUS PATIENT QL REPORTED: NO
HDLC SERPL-MCNC: 42 MG/DL
LDLC SERPL CALC-MCNC: 82 MG/DL
NONHDLC SERPL-MCNC: 108 MG/DL
TRIGL SERPL-MCNC: 128 MG/DL

## 2021-12-20 ENCOUNTER — MYC MEDICAL ADVICE (OUTPATIENT)
Dept: FAMILY MEDICINE | Facility: CLINIC | Age: 51
End: 2021-12-20
Payer: COMMERCIAL

## 2021-12-22 ENCOUNTER — TELEPHONE (OUTPATIENT)
Dept: FAMILY MEDICINE | Facility: CLINIC | Age: 51
End: 2021-12-22

## 2021-12-22 ENCOUNTER — VIRTUAL VISIT (OUTPATIENT)
Dept: PSYCHOLOGY | Facility: CLINIC | Age: 51
End: 2021-12-22
Payer: COMMERCIAL

## 2021-12-22 DIAGNOSIS — F43.23 ADJUSTMENT DISORDER WITH MIXED ANXIETY AND DEPRESSED MOOD: Primary | ICD-10-CM

## 2021-12-22 PROCEDURE — 90834 PSYTX W PT 45 MINUTES: CPT | Mod: 95 | Performed by: MARRIAGE & FAMILY THERAPIST

## 2021-12-22 NOTE — TELEPHONE ENCOUNTER
PA Initiation    Medication: emtricitabine-tenofovir AF (DESCOVY) 200-25 MG per tablet  Insurance Company: [employer] - Phone [phone] Fax [phone]  Pharmacy Filling the Rx: [id number] - [facility] [address]  Filling Pharmacy Phone: [phone]  Filling Pharmacy Fax:    Start Date: 12/22/2021    Central Prior Authorization Team   Phone: [phone]

## 2021-12-22 NOTE — TELEPHONE ENCOUNTER
Prior Authorization Retail Medication Request    Medication/Dose: emtricitabine-tenofovir AF (DESCOVY) 200-25 MG per tablet  ICD code (if different than what is on RX):    Previously Tried and Failed:    Rationale:      Insurance Name:  Na  Insurance ID:  [id number]      Pharmacy Information (if different than what is on RX)  Name:  same  Phone:  same

## 2021-12-22 NOTE — PROGRESS NOTES
Progress Note    Patient Name: Nick Montiel  Date: 12/22/2021         Service Type: Individual      Session Start Time: 12:03PM  Session End Time: 12:48PM     Session Length: 45    Session #: 13    Attendees: Client attended alone    Service Modality:  Video Visit:    Telemedicine Visit: The patient's condition can be safely assessed and treated via synchronous audio and visual telemedicine encounter.      Reason for Telemedicine Visit: Services only offered telehealth    Originating Site (Patient Location): Patient's home    Distant Site (Provider Location): Provider Remote Setting    Consent:  The patient/guardian has verbally consented to: the potential risks and benefits of telemedicine (video visit) versus in person care; bill my insurance or make self-payment for services provided; and responsibility for payment of non-covered services.     Patient would like the video invitation sent by: Send to e-mail at: [email or web address]}     Mode of Communication:  Video Conference via [employer]    As the provider I attest to compliance with applicable laws and regulations related to telemedicine.     Treatment Plan Last Reviewed: 10/27/2021    DATA  Interactive Complexity: No  Crisis: No       Progress Since Last Session (Related to Symptoms / Goals / Homework):   Symptoms: Improving client is moving into new place    Homework: Partially completed      Episode of Care Goals: Satisfactory progress - PREPARATION (Decided to change - considering how); Intervened by negotiating a change plan and determining options / strategies for behavior change, identifying triggers, exploring social supports, and working towards setting a date to begin behavior change     Current / Ongoing Stressors and Concerns:  Client talks about his concerns financially and his goals career wise.      Treatment Objective(s) Addressed in This Session:   Discuss motivation ambivalence about  making a behavior change   Encourage feelings identification     Intervention:   CBT and mindfulness       ASSESSMENT: Current Emotional / Mental Status (status of significant symptoms):   Risk status (Self / Other harm or suicidal ideation)   Patient denies current fears or concerns for personal safety.   Patient denies current or recent suicidal ideation or behaviors.   Patient denies current or recent homicidal ideation or behaviors.   Patient denies current or recent self injurious behavior or ideation.   Patient denies other safety concerns.   Patient reports there has been no change in risk factors since their last session.     Patient reports there has been no change in protective factors since their last session.     Recommended that patient call 911 or go to the local ED should there be a change in any of these risk factors.     Appearance:   Appropriate    Eye Contact:   Good    Psychomotor Behavior: Normal    Attitude:   Cooperative  Interested   Orientation:   All   Speech    Rate / Production: Talkative    Volume:  Normal    Mood:    Normal   Affect:    Appropriate    Thought Content:  Clear    Thought Form:  Coherent  Logical    Insight:    Fair      Medication Review:   No current psychiatric medications prescribed     Medication Compliance:   NA     Changes in Health Issues:   None reported     Chemical Use Review:   Substance Use: Chemical use reviewed, no active concerns identified      Tobacco Use: No current tobacco use.      Diagnosis:  1. Adjustment disorder with mixed anxiety and depressed mood        Collateral Reports Completed:   Not Applicable    PLAN: (Patient Tasks / Therapist Tasks / Other)  Homework: Manage his thoughts about finances to reduce anxiety.      LESLIE Lincoln      ______________________________________________________________________    Treatment Plan    Patient's Name: Nick Montiel  YOB: 1970    Date: 10/782191    DSM5 Diagnoses:  Substance-Related & Addictive Disorders Alcohol Use Disorder   305.00 (F10.10) Mild continued use  Psychosocial / Contextual Factors:  Individual Factors has had problems with alcohol use his adult lifetime, Family Factors Reports a negative relationship with mom and does not speak with dad.  His current partner is concerend about his alcohol use and his mood swings that cause anger,  and Economic Factors currently works as a  and is economically comfortable   WHODAS: TBD    Referral / Collaboration:  The following referral(s) will be initiated: Group support to limit alcohol use.[email or web address] or contact [facility]     Anticipated number of session or this episode of care: 10-20      MeasurableTreatment Goal(s) related to diagnosis / functional impairment(s)  Goal 1: Patient will not give up easily    I will know I've met my goal when I am finishing what I started, and completed goals.      Objective #A (Patient Action)    Patient will Identify negative self-talk and behaviors: challenge core beliefs, myths, and actions.  Status: Continued - Date(s):10/27/2021     Intervention(s)  Therapist will assign homework to engage in cognitive restructuring  provide support to critically look at how negative beliefs keep you from moving forward .    Objective #B  Patient will identify core beliefs and the times in your life that originally created beliefs.  Status: Continued - Date(s): 10/27/2021     Intervention(s)  Therapist will assign homework to challenge core beliefs  provide educational materials on core beliefs and lifetime patterns  teach how to challenge core beliefs.    Objective #C  Patient will will be working towards his goals .  Status: Continued - Date(s): 10/27/2021    Intervention(s)  Therapist will assign homework to continue to use skills from therapy  provide support to negotiate what works and what patient continues to struggle with.        Patient has reviewed and agreed to the  above plan.      Catia Willis, [facility]  October 27, 2021  Answers for HPI/ROS submitted by the patient on 8/4/2021  If you checked off any problems, how difficult have these problems made it for you to do your work, take care of things at home, or get along with other people?: Not difficult at all  PHQ9 TOTAL SCORE: 6  DAPHNE 7 TOTAL SCORE: 5

## 2021-12-28 ENCOUNTER — VIRTUAL VISIT (OUTPATIENT)
Dept: PSYCHOLOGY | Facility: CLINIC | Age: 51
End: 2021-12-28
Payer: COMMERCIAL

## 2021-12-28 DIAGNOSIS — F43.23 ADJUSTMENT DISORDER WITH MIXED ANXIETY AND DEPRESSED MOOD: Primary | ICD-10-CM

## 2021-12-28 PROCEDURE — 90834 PSYTX W PT 45 MINUTES: CPT | Mod: 95 | Performed by: MARRIAGE & FAMILY THERAPIST

## 2021-12-28 NOTE — TELEPHONE ENCOUNTER
PRIOR AUTHORIZATION DENIED    Medication: emtricitabine-tenofovir AF (DESCOVY) 200-25 MG per tablet    Denial Date: 12/24/2021    Denial Rational: Denied for the following reasons:   1) Records were not sent to us to show you had kidney issues while taking a drug called Truvada. 2) Records were not sent to us to show your bones got weaker while taking a drug called Truvada. 3) Records were not sent to us to show your kidneys are not working like normal based on lab tests.     Appeal Information: [facility] - Phone [phone] Fax [phone]    Will document denial letter once received.

## 2021-12-29 RX ORDER — EMTRICITABINE AND TENOFOVIR DISOPROXIL FUMARATE 200; 300 MG/1; MG/1
1 TABLET, FILM COATED ORAL DAILY
Qty: 90 TABLET | Refills: 0 | Status: SHIPPED | OUTPATIENT
Start: 2021-12-29

## 2021-12-29 NOTE — TELEPHONE ENCOUNTER
Please let patient know he needs to start with Truvada. Insurance will not cover Descovy without specific indications, which he does not have.     Babs Dumas MD

## 2021-12-30 NOTE — PROGRESS NOTES
Progress Note    Patient Name: Nick Montiel  Date: 12/28/2021         Service Type: Individual      Session Start Time: 12:03PM  Session End Time: 12:48PM     Session Length: 45    Session #: 14    Attendees: Client attended alone    Service Modality:  Video Visit:    Telemedicine Visit: The patient's condition can be safely assessed and treated via synchronous audio and visual telemedicine encounter.      Reason for Telemedicine Visit: Services only offered telehealth    Originating Site (Patient Location): Patient's home    Distant Site (Provider Location): Provider Remote Setting    Consent:  The patient/guardian has verbally consented to: the potential risks and benefits of telemedicine (video visit) versus in person care; bill my insurance or make self-payment for services provided; and responsibility for payment of non-covered services.     Patient would like the video invitation sent by: Send to e-mail at: [email or web address]     Mode of Communication:  Video Conference via [employer]    As the provider I attest to compliance with applicable laws and regulations related to telemedicine.     Treatment Plan Last Reviewed: 10/27/2021    DATA  Interactive Complexity: No  Crisis: No       Progress Since Last Session (Related to Symptoms / Goals / Homework):   Symptoms: Worsening increased depressed mood    Homework: Partially completed      Episode of Care Goals: Minimal progress - ACTION (Actively working towards change); Intervened by reinforcing change plan / affirming steps taken     Current / Ongoing Stressors and Concerns:  Client talks about his sadness over his lack of community and relationship as well as his concerns financially. Talked about ways to improve both including the use of medication.     Treatment Objective(s) Addressed in This Session:   Discuss motivation ambivalence about making a behavior change   Encourage feelings  identification     Intervention:   CBT and mindfulness       ASSESSMENT: Current Emotional / Mental Status (status of significant symptoms):   Risk status (Self / Other harm or suicidal ideation)   Patient denies current fears or concerns for personal safety.   Patient denies current or recent suicidal ideation or behaviors.   Patient denies current or recent homicidal ideation or behaviors.   Patient denies current or recent self injurious behavior or ideation.   Patient denies other safety concerns.   Patient reports there has been no change in risk factors since their last session.     Patient reports there has been no change in protective factors since their last session.     Recommended that patient call 911 or go to the local ED should there be a change in any of these risk factors.     Appearance:   Appropriate    Eye Contact:   Good    Psychomotor Behavior: Normal    Attitude:   Cooperative  Interested   Orientation:   All   Speech    Rate / Production: Talkative    Volume:  Normal    Mood:    Depressed  Sad    Affect:    Appropriate    Thought Content:  Clear    Thought Form:  Coherent  Logical    Insight:    Fair      Medication Review:   No current psychiatric medications prescribed     Medication Compliance:   NA     Changes in Health Issues:   None reported     Chemical Use Review:   Substance Use: Chemical use reviewed, no active concerns identified      Tobacco Use: No current tobacco use.      Diagnosis:  1. Adjustment disorder with mixed anxiety and depressed mood        Collateral Reports Completed:   Not Applicable    PLAN: (Patient Tasks / Therapist Tasks / Other)  Homework: Take on extra projects through work, continue to find ways to meet people and grow his friend group.      LESLIE Lincoln      ______________________________________________________________________    Treatment Plan    Patient's Name: Nick Montiel  YOB: 1970    Date: 10/198720    DSM5  Diagnoses: Substance-Related & Addictive Disorders Alcohol Use Disorder   305.00 (F10.10) Mild continued use  Psychosocial / Contextual Factors:  Individual Factors has had problems with alcohol use his adult lifetime, Family Factors Reports a negative relationship with mom and does not speak with dad.  His current partner is concerend about his alcohol use and his mood swings that cause anger,  and Economic Factors currently works as a  and is economically comfortable   WHODAS: TBD    Referral / Collaboration:  The following referral(s) will be initiated: Group support to limit alcohol use.[email or web address] or contact [facility]     Anticipated number of session or this episode of care: 10-20      MeasurableTreatment Goal(s) related to diagnosis / functional impairment(s)  Goal 1: Patient will not give up easily    I will know I've met my goal when I am finishing what I started, and completed goals.      Objective #A (Patient Action)    Patient will Identify negative self-talk and behaviors: challenge core beliefs, myths, and actions.  Status: Continued - Date(s):10/27/2021     Intervention(s)  Therapist will assign homework to engage in cognitive restructuring  provide support to critically look at how negative beliefs keep you from moving forward .    Objective #B  Patient will identify core beliefs and the times in your life that originally created beliefs.  Status: Continued - Date(s): 10/27/2021     Intervention(s)  Therapist will assign homework to challenge core beliefs  provide educational materials on core beliefs and lifetime patterns  teach how to challenge core beliefs.    Objective #C  Patient will will be working towards his goals .  Status: Continued - Date(s): 10/27/2021    Intervention(s)  Therapist will assign homework to continue to use skills from therapy  provide support to negotiate what works and what patient continues to struggle with.        Patient has reviewed and  agreed to the above plan.      Catia Willis, LESLIE  October 27, 2021  Answers for HPI/ROS submitted by the patient on 8/4/2021  If you checked off any problems, how difficult have these problems made it for you to do your work, take care of things at home, or get along with other people?: Not difficult at all  PHQ9 TOTAL SCORE: 6  DAPHNE 7 TOTAL SCORE: 5

## 2022-01-12 ENCOUNTER — VIRTUAL VISIT (OUTPATIENT)
Dept: PSYCHOLOGY | Facility: CLINIC | Age: 52
End: 2022-01-12
Payer: COMMERCIAL

## 2022-01-12 DIAGNOSIS — F43.23 ADJUSTMENT DISORDER WITH MIXED ANXIETY AND DEPRESSED MOOD: Primary | ICD-10-CM

## 2022-01-12 PROCEDURE — 90834 PSYTX W PT 45 MINUTES: CPT | Mod: 95 | Performed by: MARRIAGE & FAMILY THERAPIST

## 2022-01-12 NOTE — PROGRESS NOTES
Progress Note    Patient Name: Nick Montiel  Date: 1/12/2022         Service Type: Individual      Session Start Time: 12:03PM  Session End Time: 12:48PM     Session Length: 45    Session #: 15    Attendees: Client attended alone    Service Modality:  Video Visit:    Telemedicine Visit: The patient's condition can be safely assessed and treated via synchronous audio and visual telemedicine encounter.      Reason for Telemedicine Visit: Services only offered telehealth    Originating Site (Patient Location): Patient's home    Distant Site (Provider Location): Provider Remote Setting    Consent:  The patient/guardian has verbally consented to: the potential risks and benefits of telemedicine (video visit) versus in person care; bill my insurance or make self-payment for services provided; and responsibility for payment of non-covered services.     Patient would like the video invitation sent by: Send to e-mail at: [email or web address]     Mode of Communication:  Video Conference via [employer]    As the provider I attest to compliance with applicable laws and regulations related to telemedicine.     Treatment Plan Last Reviewed: 10/27/2021    DATA  Interactive Complexity: No  Crisis: No       Progress Since Last Session (Related to Symptoms / Goals / Homework):   Symptoms: Improving less depression    Homework: Partially completed      Episode of Care Goals: Satisfactory progress - ACTION (Actively working towards change); Intervened by reinforcing change plan / affirming steps taken     Current / Ongoing Stressors and Concerns:  Client shares about events of the past 2 weeks that have been stressful. Processed his decision making.     Treatment Objective(s) Addressed in This Session:   Discuss motivation ambivalence about making a behavior change   Encourage feelings identification     Intervention:   CBT and mindfulness       ASSESSMENT: Current Emotional /  Mental Status (status of significant symptoms):   Risk status (Self / Other harm or suicidal ideation)   Patient denies current fears or concerns for personal safety.   Patient denies current or recent suicidal ideation or behaviors.   Patient denies current or recent homicidal ideation or behaviors.   Patient denies current or recent self injurious behavior or ideation.   Patient denies other safety concerns.   Patient reports there has been no change in risk factors since their last session.     Patient reports there has been no change in protective factors since their last session.     Recommended that patient call 911 or go to the local ED should there be a change in any of these risk factors.     Appearance:   Appropriate    Eye Contact:   Good    Psychomotor Behavior: Normal    Attitude:   Cooperative  Interested   Orientation:   All   Speech    Rate / Production: Talkative    Volume:  Normal    Mood:    Normal   Affect:    Appropriate    Thought Content:  Clear    Thought Form:  Coherent  Logical    Insight:    Fair      Medication Review:   No current psychiatric medications prescribed Considering one     Medication Compliance:   NA     Changes in Health Issues:   None reported     Chemical Use Review:   Substance Use: Chemical use reviewed, no active concerns identified      Tobacco Use: No current tobacco use.      Diagnosis:  1. Adjustment disorder with mixed anxiety and depressed mood        Collateral Reports Completed:   Not Applicable    PLAN: (Patient Tasks / Therapist Tasks / Other)  Homework: Continue growing income, weigh pros and cons of new job and reduce alcohol consumption.      LESLIE Lincoln      ______________________________________________________________________    Treatment Plan    Patient's Name: Nick Montiel  YOB: 1970    Date: 10/235826    DSM5 Diagnoses: Substance-Related & Addictive Disorders Alcohol Use Disorder   305.00 (F10.10) Mild continued  use  Psychosocial / Contextual Factors:  Individual Factors has had problems with alcohol use his adult lifetime, Family Factors Reports a negative relationship with mom and does not speak with dad.  His current partner is concerend about his alcohol use and his mood swings that cause anger,  and Economic Factors currently works as a  and is economically comfortable   WHODAS: TBD    Referral / Collaboration:  The following referral(s) will be initiated: Group support to limit alcohol use.[email or web address] or [facility]     Anticipated number of session or this episode of care: 10-20      MeasurableTreatment Goal(s) related to diagnosis / functional impairment(s)  Goal 1: Patient will not give up easily    I will know I've met my goal when I am finishing what I started, and completed goals.      Objective #A (Patient Action)    Patient will Identify negative self-talk and behaviors: challenge core beliefs, myths, and actions.  Status: Continued - Date(s):10/27/2021     Intervention(s)  Therapist will assign homework to engage in cognitive restructuring  provide support to critically look at how negative beliefs keep you from moving forward .    Objective #B  Patient will identify core beliefs and the times in your life that originally created beliefs.  Status: Continued - Date(s): 10/27/2021     Intervention(s)  Therapist will assign homework to challenge core beliefs  provide educational materials on core beliefs and lifetime patterns  teach how to challenge core beliefs.    Objective #C  Patient will will be working towards his goals .  Status: Continued - Date(s): 10/27/2021    Intervention(s)  Therapist will assign homework to continue to use skills from therapy  provide support to negotiate what works and what patient continues to struggle with.        Patient has reviewed and agreed to the above plan.      LESLIE Lincoln  October 27, 2021  Answers for HPI/ROS submitted by  the patient on 8/4/2021  If you checked off any problems, how difficult have these problems made it for you to do your work, take care of things at home, or get along with other people?: Not difficult at all  PHQ9 TOTAL SCORE: 6  DAPHNE 7 TOTAL SCORE: 5

## 2022-09-18 ENCOUNTER — HEALTH MAINTENANCE LETTER (OUTPATIENT)
Age: 52
End: 2022-09-18

## 2023-01-29 ENCOUNTER — HEALTH MAINTENANCE LETTER (OUTPATIENT)
Age: 53
End: 2023-01-29

## 2024-02-25 ENCOUNTER — HEALTH MAINTENANCE LETTER (OUTPATIENT)
Age: 54
End: 2024-02-25